# Patient Record
Sex: MALE | Race: BLACK OR AFRICAN AMERICAN | NOT HISPANIC OR LATINO | URBAN - METROPOLITAN AREA
[De-identification: names, ages, dates, MRNs, and addresses within clinical notes are randomized per-mention and may not be internally consistent; named-entity substitution may affect disease eponyms.]

---

## 2023-03-03 ENCOUNTER — INPATIENT (INPATIENT)
Facility: HOSPITAL | Age: 63
LOS: 2 days | Discharge: HOME HEALTH | End: 2023-03-06
Attending: INTERNAL MEDICINE | Admitting: INTERNAL MEDICINE
Payer: COMMERCIAL

## 2023-03-03 VITALS
SYSTOLIC BLOOD PRESSURE: 198 MMHG | TEMPERATURE: 99 F | DIASTOLIC BLOOD PRESSURE: 119 MMHG | OXYGEN SATURATION: 95 % | RESPIRATION RATE: 16 BRPM | HEART RATE: 117 BPM

## 2023-03-03 DIAGNOSIS — Z29.9 ENCOUNTER FOR PROPHYLACTIC MEASURES, UNSPECIFIED: ICD-10-CM

## 2023-03-03 DIAGNOSIS — K62.5 HEMORRHAGE OF ANUS AND RECTUM: ICD-10-CM

## 2023-03-03 DIAGNOSIS — I10 ESSENTIAL (PRIMARY) HYPERTENSION: ICD-10-CM

## 2023-03-03 DIAGNOSIS — I50.9 HEART FAILURE, UNSPECIFIED: ICD-10-CM

## 2023-03-03 DIAGNOSIS — E78.5 HYPERLIPIDEMIA, UNSPECIFIED: ICD-10-CM

## 2023-03-03 DIAGNOSIS — E11.65 TYPE 2 DIABETES MELLITUS WITH HYPERGLYCEMIA: ICD-10-CM

## 2023-03-03 LAB
ALBUMIN SERPL ELPH-MCNC: 4.1 G/DL — SIGNIFICANT CHANGE UP (ref 3.3–5)
ALP SERPL-CCNC: 132 U/L — HIGH (ref 40–120)
ALT FLD-CCNC: 76 U/L — HIGH (ref 4–41)
ANION GAP SERPL CALC-SCNC: 9 MMOL/L — SIGNIFICANT CHANGE UP (ref 7–14)
AST SERPL-CCNC: 59 U/L — HIGH (ref 4–40)
BASOPHILS # BLD AUTO: 0.01 K/UL — SIGNIFICANT CHANGE UP (ref 0–0.2)
BASOPHILS NFR BLD AUTO: 0.2 % — SIGNIFICANT CHANGE UP (ref 0–2)
BILIRUB SERPL-MCNC: 0.4 MG/DL — SIGNIFICANT CHANGE UP (ref 0.2–1.2)
BUN SERPL-MCNC: 12 MG/DL — SIGNIFICANT CHANGE UP (ref 7–23)
CALCIUM SERPL-MCNC: 9.4 MG/DL — SIGNIFICANT CHANGE UP (ref 8.4–10.5)
CHLORIDE SERPL-SCNC: 101 MMOL/L — SIGNIFICANT CHANGE UP (ref 98–107)
CO2 SERPL-SCNC: 29 MMOL/L — SIGNIFICANT CHANGE UP (ref 22–31)
CREAT SERPL-MCNC: 0.94 MG/DL — SIGNIFICANT CHANGE UP (ref 0.5–1.3)
EGFR: 91 ML/MIN/1.73M2 — SIGNIFICANT CHANGE UP
EOSINOPHIL # BLD AUTO: 0.03 K/UL — SIGNIFICANT CHANGE UP (ref 0–0.5)
EOSINOPHIL NFR BLD AUTO: 0.5 % — SIGNIFICANT CHANGE UP (ref 0–6)
FLUAV AG NPH QL: SIGNIFICANT CHANGE UP
FLUBV AG NPH QL: SIGNIFICANT CHANGE UP
GLUCOSE SERPL-MCNC: 356 MG/DL — HIGH (ref 70–99)
HCT VFR BLD CALC: 42.7 % — SIGNIFICANT CHANGE UP (ref 39–50)
HGB BLD-MCNC: 13.7 G/DL — SIGNIFICANT CHANGE UP (ref 13–17)
IANC: 4 K/UL — SIGNIFICANT CHANGE UP (ref 1.8–7.4)
IMM GRANULOCYTES NFR BLD AUTO: 0.2 % — SIGNIFICANT CHANGE UP (ref 0–0.9)
LIDOCAIN IGE QN: 15 U/L — SIGNIFICANT CHANGE UP (ref 7–60)
LYMPHOCYTES # BLD AUTO: 1.8 K/UL — SIGNIFICANT CHANGE UP (ref 1–3.3)
LYMPHOCYTES # BLD AUTO: 28.2 % — SIGNIFICANT CHANGE UP (ref 13–44)
MCHC RBC-ENTMCNC: 27.7 PG — SIGNIFICANT CHANGE UP (ref 27–34)
MCHC RBC-ENTMCNC: 32.1 GM/DL — SIGNIFICANT CHANGE UP (ref 32–36)
MCV RBC AUTO: 86.4 FL — SIGNIFICANT CHANGE UP (ref 80–100)
MONOCYTES # BLD AUTO: 0.53 K/UL — SIGNIFICANT CHANGE UP (ref 0–0.9)
MONOCYTES NFR BLD AUTO: 8.3 % — SIGNIFICANT CHANGE UP (ref 2–14)
NEUTROPHILS # BLD AUTO: 4 K/UL — SIGNIFICANT CHANGE UP (ref 1.8–7.4)
NEUTROPHILS NFR BLD AUTO: 62.6 % — SIGNIFICANT CHANGE UP (ref 43–77)
NRBC # BLD: 0 /100 WBCS — SIGNIFICANT CHANGE UP (ref 0–0)
NRBC # FLD: 0 K/UL — SIGNIFICANT CHANGE UP (ref 0–0)
NT-PROBNP SERPL-SCNC: 2898 PG/ML — HIGH
PLATELET # BLD AUTO: 270 K/UL — SIGNIFICANT CHANGE UP (ref 150–400)
POTASSIUM SERPL-MCNC: 4.3 MMOL/L — SIGNIFICANT CHANGE UP (ref 3.5–5.3)
POTASSIUM SERPL-SCNC: 4.3 MMOL/L — SIGNIFICANT CHANGE UP (ref 3.5–5.3)
PROT SERPL-MCNC: 7.2 G/DL — SIGNIFICANT CHANGE UP (ref 6–8.3)
RBC # BLD: 4.94 M/UL — SIGNIFICANT CHANGE UP (ref 4.2–5.8)
RBC # FLD: 12.4 % — SIGNIFICANT CHANGE UP (ref 10.3–14.5)
RSV RNA NPH QL NAA+NON-PROBE: SIGNIFICANT CHANGE UP
SARS-COV-2 RNA SPEC QL NAA+PROBE: SIGNIFICANT CHANGE UP
SODIUM SERPL-SCNC: 139 MMOL/L — SIGNIFICANT CHANGE UP (ref 135–145)
TROPONIN T, HIGH SENSITIVITY RESULT: 36 NG/L — SIGNIFICANT CHANGE UP
TROPONIN T, HIGH SENSITIVITY RESULT: 39 NG/L — SIGNIFICANT CHANGE UP
WBC # BLD: 6.38 K/UL — SIGNIFICANT CHANGE UP (ref 3.8–10.5)
WBC # FLD AUTO: 6.38 K/UL — SIGNIFICANT CHANGE UP (ref 3.8–10.5)

## 2023-03-03 PROCEDURE — 71046 X-RAY EXAM CHEST 2 VIEWS: CPT | Mod: 26

## 2023-03-03 PROCEDURE — 99285 EMERGENCY DEPT VISIT HI MDM: CPT

## 2023-03-03 PROCEDURE — 99223 1ST HOSP IP/OBS HIGH 75: CPT

## 2023-03-03 RX ORDER — ASPIRIN/CALCIUM CARB/MAGNESIUM 324 MG
81 TABLET ORAL DAILY
Refills: 0 | Status: DISCONTINUED | OUTPATIENT
Start: 2023-03-04 | End: 2023-03-06

## 2023-03-03 RX ORDER — DEXTROSE 50 % IN WATER 50 %
25 SYRINGE (ML) INTRAVENOUS ONCE
Refills: 0 | Status: DISCONTINUED | OUTPATIENT
Start: 2023-03-03 | End: 2023-03-06

## 2023-03-03 RX ORDER — HYDRALAZINE HCL 50 MG
10 TABLET ORAL ONCE
Refills: 0 | Status: COMPLETED | OUTPATIENT
Start: 2023-03-03 | End: 2023-03-03

## 2023-03-03 RX ORDER — METOPROLOL TARTRATE 50 MG
12.5 TABLET ORAL
Refills: 0 | Status: DISCONTINUED | OUTPATIENT
Start: 2023-03-03 | End: 2023-03-04

## 2023-03-03 RX ORDER — DEXTROSE 50 % IN WATER 50 %
12.5 SYRINGE (ML) INTRAVENOUS ONCE
Refills: 0 | Status: DISCONTINUED | OUTPATIENT
Start: 2023-03-03 | End: 2023-03-06

## 2023-03-03 RX ORDER — ACETAMINOPHEN 500 MG
650 TABLET ORAL EVERY 6 HOURS
Refills: 0 | Status: DISCONTINUED | OUTPATIENT
Start: 2023-03-03 | End: 2023-03-06

## 2023-03-03 RX ORDER — FUROSEMIDE 40 MG
40 TABLET ORAL DAILY
Refills: 0 | Status: DISCONTINUED | OUTPATIENT
Start: 2023-03-04 | End: 2023-03-06

## 2023-03-03 RX ORDER — SODIUM CHLORIDE 9 MG/ML
1000 INJECTION, SOLUTION INTRAVENOUS
Refills: 0 | Status: DISCONTINUED | OUTPATIENT
Start: 2023-03-03 | End: 2023-03-06

## 2023-03-03 RX ORDER — DEXTROSE 50 % IN WATER 50 %
15 SYRINGE (ML) INTRAVENOUS ONCE
Refills: 0 | Status: DISCONTINUED | OUTPATIENT
Start: 2023-03-03 | End: 2023-03-06

## 2023-03-03 RX ORDER — INSULIN GLARGINE 100 [IU]/ML
15 INJECTION, SOLUTION SUBCUTANEOUS AT BEDTIME
Refills: 0 | Status: DISCONTINUED | OUTPATIENT
Start: 2023-03-04 | End: 2023-03-06

## 2023-03-03 RX ORDER — INSULIN LISPRO 100/ML
VIAL (ML) SUBCUTANEOUS AT BEDTIME
Refills: 0 | Status: DISCONTINUED | OUTPATIENT
Start: 2023-03-03 | End: 2023-03-06

## 2023-03-03 RX ORDER — LANOLIN ALCOHOL/MO/W.PET/CERES
3 CREAM (GRAM) TOPICAL AT BEDTIME
Refills: 0 | Status: DISCONTINUED | OUTPATIENT
Start: 2023-03-03 | End: 2023-03-06

## 2023-03-03 RX ORDER — FUROSEMIDE 40 MG
40 TABLET ORAL ONCE
Refills: 0 | Status: COMPLETED | OUTPATIENT
Start: 2023-03-03 | End: 2023-03-03

## 2023-03-03 RX ORDER — INSULIN GLARGINE 100 [IU]/ML
15 INJECTION, SOLUTION SUBCUTANEOUS ONCE
Refills: 0 | Status: COMPLETED | OUTPATIENT
Start: 2023-03-03 | End: 2023-03-03

## 2023-03-03 RX ORDER — LISINOPRIL 2.5 MG/1
10 TABLET ORAL ONCE
Refills: 0 | Status: COMPLETED | OUTPATIENT
Start: 2023-03-03 | End: 2023-03-03

## 2023-03-03 RX ORDER — INSULIN LISPRO 100/ML
VIAL (ML) SUBCUTANEOUS
Refills: 0 | Status: DISCONTINUED | OUTPATIENT
Start: 2023-03-03 | End: 2023-03-06

## 2023-03-03 RX ORDER — LISINOPRIL 2.5 MG/1
10 TABLET ORAL DAILY
Refills: 0 | Status: DISCONTINUED | OUTPATIENT
Start: 2023-03-04 | End: 2023-03-04

## 2023-03-03 RX ORDER — INSULIN LISPRO 100/ML
4 VIAL (ML) SUBCUTANEOUS
Refills: 0 | Status: DISCONTINUED | OUTPATIENT
Start: 2023-03-03 | End: 2023-03-06

## 2023-03-03 RX ORDER — GLUCAGON INJECTION, SOLUTION 0.5 MG/.1ML
1 INJECTION, SOLUTION SUBCUTANEOUS ONCE
Refills: 0 | Status: DISCONTINUED | OUTPATIENT
Start: 2023-03-03 | End: 2023-03-06

## 2023-03-03 RX ORDER — ENOXAPARIN SODIUM 100 MG/ML
40 INJECTION SUBCUTANEOUS EVERY 24 HOURS
Refills: 0 | Status: DISCONTINUED | OUTPATIENT
Start: 2023-03-03 | End: 2023-03-06

## 2023-03-03 RX ADMIN — Medication 10 MILLIGRAM(S): at 14:08

## 2023-03-03 RX ADMIN — Medication 10 MILLIGRAM(S): at 19:53

## 2023-03-03 RX ADMIN — Medication 40 MILLIGRAM(S): at 14:08

## 2023-03-03 RX ADMIN — LISINOPRIL 10 MILLIGRAM(S): 2.5 TABLET ORAL at 21:34

## 2023-03-03 RX ADMIN — INSULIN GLARGINE 15 UNIT(S): 100 INJECTION, SOLUTION SUBCUTANEOUS at 22:24

## 2023-03-03 RX ADMIN — Medication 1: at 22:24

## 2023-03-03 RX ADMIN — Medication 12.5 MILLIGRAM(S): at 21:34

## 2023-03-03 NOTE — ED PROVIDER NOTE - NS ED ATTENDING STATEMENT MOD
This was a shared visit with the TAPAN. I reviewed and verified the documentation and independently performed the documented:

## 2023-03-03 NOTE — ED PROVIDER NOTE - CARDIAC, MLM
Normal rate, regular rhythm.  Heart sounds S1, S2.  No murmurs, rubs or gallops. 1+ Pitting edema b/l

## 2023-03-03 NOTE — PATIENT PROFILE ADULT - FUNCTIONAL SCREEN CURRENT LEVEL: SWALLOWING (IF SCORE 2 OR MORE FOR ANY ITEM, CONSULT REHAB SERVICES), MLM)
Addended by: Hannah Gamboa on: 3/12/2021 01:38 PM     Modules accepted: Orders 0 = swallows foods/liquids without difficulty

## 2023-03-03 NOTE — H&P ADULT - HISTORY OF PRESENT ILLNESS
62M with PMHx DM2, HTN presenting with SOB x1 week. Patient notes hx of HTN and was previously on hydralazine and diovan but states "they were not doing anything for me" and stopped taking them months ago. He also stopped taking metformin which was his only DM medication and is not on insulin. The last time he saw his PMD was about 6 months ago and plans to get a new one. He also noted LE edema x8 months without workup. This past week he had more SOB and ROSS limiting him. He also has orthopnea and is sleeping with one large thick pillow now and mostly on his side. He has PND as well. He went to [facility] [place] today and was told of fluid around his lungs and so presented to ED. He denies fevers, chills, CP, abdominal pain, vomiting, diarrhea, palpitations, LOC. He also endorses a dry cough x3 weeks. Also endorses 1-2 episodes of small BRBPR this past week and his only medication is ASA. Denies having any today. Denies prior hx GIB, CHF, CAD, stents, stroke, arrhythmia.    In ED, probnp elevated, CXR with vascular congestion. Received lasix 40 IV once and hydral 10mg x2 doses

## 2023-03-03 NOTE — H&P ADULT - PROBLEM SELECTOR PLAN 4
Lovenox 40mg qd  DASH/TLC CC fluid restricted diet 1-2 small episodes of this this week. Denies today. Hgb normal, VSS  -FOBT, ferritin, iron  -if no drops in hgb or recurrence, then outpatient PMD f/u. Patient advised of this to f/u with PMD regarding C-scope. He has never had one before. He verbalized understanding and agrees.

## 2023-03-03 NOTE — ED ADULT NURSE NOTE - OBJECTIVE STATEMENT
Patient A&o X4, history of HTN and DM, received in room 19, with complaints of SOB/cough. Patient states, "I went to  and they sent me here because the xray showed fluid on my lung ". Patient reports having dry cough for a few months now, patient denies any CP. Patient admits to SOB on exertion, denies SOB at rest, spO2 98% on RA. Patient also complains of b/l leg swelling x months, denies any numbness or tingling in legs at this time. Patient is hypertensive during assessment, admits to non compliance to BP medication. Patient able to speak in clear sentences, respirations equal and unlabored. Lung sounds clear b/l, equal chest rise and fall noted. Denies CP fever, chills, nausea, vomiting and diarrhea at this time. Skin warm and dry. Provider at bedside for eval, pending further orders.

## 2023-03-03 NOTE — ED PROVIDER NOTE - OBJECTIVE STATEMENT
66 yo male with pmhx of HTN HLD, DM presents to the ED with cc of sob x 3 d, sent in by urgent care for pleural and cardiac effusion drainage. patient states that dry cough x 4 weeks, not  getting better with Robitussin. endorses started dyspnea at rest, orthopnea and PND. Patient denies fever, chills, cp, n/v/dxxx    NKDA 66 yo male with pmhx of HTN HLD, DM presents to the ED with cc of sob x 3 d, sent in by urgent care for evaluation of pleural and cardiac effusion seen on cxr. patient states that he has had dry cough x 4 weeks, not  getting better with Robitussin and abx. endorses started ROSS, orthopnea and PND for past few days and went back to urgent care where he had cxr done today with above findings. Admits to van edema intermittently x 1 year. Pt is noncompliant with his BP meds. Patient denies fever, chills, cp, n/v/d, abdominal pain.    NKDA

## 2023-03-03 NOTE — H&P ADULT - ASSESSMENT
no
62M with PMHx DM2, HTN (not on any meds) presenting with SOB x1 week. Admitted for acute CHF exacerbation

## 2023-03-03 NOTE — H&P ADULT - PROBLEM SELECTOR PLAN 1
Hypervolemic on exam. +crackles, LE edema and elevated probnp. Admitted for new onset acute decompensated heart failure exacerbation.   -tele  -echo  -replete K<4 and Mg <2, fluid restriction  -lasix 40 IV qd  -start metoprolol 12.5mg BID for better rate control, start lisinopril 10mg qd  -rest of w/u per primary cardiology team in AM Dr. Zavala. Will likely need eventual stress testing vs angiogram for w/u of likely CHF Hypervolemic on exam. +crackles, LE edema and elevated probnp. Admitted for new onset acute decompensated heart failure exacerbation likely cause of respiratory distress. Will need IV diuresis on admission  -tele  -echo  -replete K<4 and Mg <2, fluid restriction  -lasix 40 IV qd  -start metoprolol 12.5mg BID for better rate control, start lisinopril 10mg qd  -rest of w/u per primary cardiology team in AM Dr. Zavala. Will likely need eventual stress testing vs angiogram for w/u of likely CHF  Transaminitis - likely from possible right sided HF? - US RUQ abdomen ordered

## 2023-03-03 NOTE — H&P ADULT - NSHPLABSRESULTS_GEN_ALL_CORE
Personally reviewed labs:                        13.7   6.38  )-----------( 270      ( 03 Mar 2023 14:40 )             42.7     03-03-23 @ 14:40    139  |  101  |  12             --------------------------< 356<H>     4.3  |  29  | 0.94    eGFR AA: --  eGFR N-AA: --    Calcium: 9.4  Phosphorus: --  Magnesium: --    AST: 59<H>    ALT: 76<H>  AlkPhos: 132<H>  Protein: 7.2  Albumin: 4.1  TBili: 0.4  D-Bili: --    Troponin 36--39  Probnp 2898      RADIOLOGY & ADDITIONAL TESTS:    EKG my independent interpretation: sinus tachycardia @ 110bpm, PVC, LVH with repol abnormalities, LAE, left axis deviation, no STD or ASHISH. incomplete RBBB    CXR my independent interpretation: no focal consolidation, mild vascular congestion

## 2023-03-03 NOTE — ED PROVIDER NOTE - ATTENDING APP SHARED VISIT CONTRIBUTION OF CARE
I have evaluated the patient and agree with the documentation and assessment as made by the TAPAN. We have discussed plan of care and work up for the patient.   This was a shared visit with the TAPAN. I reviewed and verified the documentation and independently performed the documented:   History, Exam and Medical Decision Making.    67M, HTN, HLD, DM who is sent in from urgent care for fluid in the lungs. For the past month, has been dealing with a dry cough. This has been complicated by worsening shortness of breath over the past 3 days. Reports leg swelling and PND. No hx of CHF - not on diuretics. Non-compliant on diovan and hydralazine. No headaches, fevers, chill chest pain, belly pain, nvd, dysuria, hematuria, recent travel, trauma, syncope. Physical: afebrile, non-toxic appearing, rrr, faint crackles b/l, abdomen soft, 1+ pitting edema of the LE bilaterally. Plan: labs, CXR - admit for new onset heart failure.

## 2023-03-03 NOTE — H&P ADULT - NSHPREVIEWOFSYSTEMS_GEN_ALL_CORE
ROS:    Constitutional: [ ] fevers [ ] chills [x ] weight gain  HEENT: [ ] postnasal drip [ ] nasal congestion  CV: [ ] chest pain [x ] orthopnea [ ] palpitations r  Resp: [ ] cough [x ] shortness of breath [ x] dyspnea [ ] wheezing   GI: [ ] nausea [ ] vomiting [ ] diarrhea [ ] constipation [ ] abd pain [x] BRBPR  : [ ] dysuria  [ ] increased urinary frequency  Musculoskeletal: [ ] back pain [ ] myalgias [ ] arthralgias [ ] fracture  Skin: [ ] rash [ ] itch  Neurological: [ ] headache [ ] dizziness [ ] syncope   Endocrine: [x ] diabetes [ ] thyroid problem  Hematologic/Lymphatic: [ ] anemia [ ] bleeding problem  [x ] All other systems negative

## 2023-03-03 NOTE — ED PROVIDER NOTE - PROGRESS NOTE DETAILS
Supervising Statement (TEMO Edward PA-C): I have personally seen and examined this patient.  I have fully participated in the care of this patient. I have reviewed all pertinent clinical information, including history, physical exam, plan and PA student’s note and agree as noted. Case discussed with Dr RISHI Zavala - requests admission to Dr MITZI Zavala

## 2023-03-03 NOTE — PATIENT PROFILE ADULT - FALL HARM RISK - HARM RISK INTERVENTIONS

## 2023-03-03 NOTE — H&P ADULT - PROBLEM SELECTOR PLAN 3
Not on meds, not on insulin  -a1c. Possible endo c/s for new insulin requirement depending on A1C  -start lantus 15u qhs, admelog 4u TID  -ISS Not on meds, not on insulin  -a1c 11.1 - endo consult emailed  -start lantus 15u qhs, admelog 4u TID  -ISS

## 2023-03-03 NOTE — H&P ADULT - NSHPPHYSICALEXAM_GEN_ALL_CORE
PHYSICAL EXAM:  Vital Signs Last 24 Hrs  T(C): 37 (03-03-23 @ 18:39)  T(F): 98.6 (03-03-23 @ 18:39), Max: 98.9 (03-03-23 @ 12:36)  HR: 95 (03-03-23 @ 18:39) (95 - 117)  BP: 178/102 (03-03-23 @ 19:43)  BP(mean): --  RR: 18 (03-03-23 @ 18:39) (16 - 22)  SpO2: 98% (03-03-23 @ 18:39) (95% - 101%)  Wt(kg): --    Constitutional: NAD, awake and alert, well developed  EYES: EOMI, conjunctiva clear  ENT:  Normal Hearing, no tonsillar exudates   Neck: Soft and supple , no thyromegaly   Respiratory: bilateral crackles at the bases, no tachypnea, no accessory muscle use  Cardiovascular: S1 and S2, regular rate and rhythm, no Murmurs, gallops or rubs, no JVD, 1+ symmetric leg edema  Gastrointestinal: Bowel Sounds present, soft, nontender, nondistended, no guarding, no rebound  Extremities: No cyanosis or clubbing; warm to touch  Vascular: 2+ peripheral pulses lower ex  Neurological: No focal deficits, CN II-XII intact bilaterally, sensation to light touch intact in all extremities.   Musculoskeletal: 5/5 strength b/l upper and lower extremities; no joint swelling.  Skin: No rashes, no ulcerations

## 2023-03-04 DIAGNOSIS — E78.49 OTHER HYPERLIPIDEMIA: ICD-10-CM

## 2023-03-04 LAB
A1C WITH ESTIMATED AVERAGE GLUCOSE RESULT: 11.3 % — HIGH (ref 4–5.6)
ANION GAP SERPL CALC-SCNC: 10 MMOL/L — SIGNIFICANT CHANGE UP (ref 7–14)
BUN SERPL-MCNC: 12 MG/DL — SIGNIFICANT CHANGE UP (ref 7–23)
CALCIUM SERPL-MCNC: 9 MG/DL — SIGNIFICANT CHANGE UP (ref 8.4–10.5)
CHLORIDE SERPL-SCNC: 102 MMOL/L — SIGNIFICANT CHANGE UP (ref 98–107)
CO2 SERPL-SCNC: 28 MMOL/L — SIGNIFICANT CHANGE UP (ref 22–31)
CREAT SERPL-MCNC: 0.87 MG/DL — SIGNIFICANT CHANGE UP (ref 0.5–1.3)
EGFR: 97 ML/MIN/1.73M2 — SIGNIFICANT CHANGE UP
ESTIMATED AVERAGE GLUCOSE: 278 — SIGNIFICANT CHANGE UP
FERRITIN SERPL-MCNC: 138 NG/ML — SIGNIFICANT CHANGE UP (ref 30–400)
GLUCOSE SERPL-MCNC: 202 MG/DL — HIGH (ref 70–99)
HCT VFR BLD CALC: 40.8 % — SIGNIFICANT CHANGE UP (ref 39–50)
HCV AB S/CO SERPL IA: 0.1 S/CO — SIGNIFICANT CHANGE UP (ref 0–0.99)
HCV AB SERPL-IMP: SIGNIFICANT CHANGE UP
HGB BLD-MCNC: 13.4 G/DL — SIGNIFICANT CHANGE UP (ref 13–17)
IRON SATN MFR SERPL: 20 % — SIGNIFICANT CHANGE UP (ref 14–50)
IRON SATN MFR SERPL: 56 UG/DL — SIGNIFICANT CHANGE UP (ref 45–165)
MAGNESIUM SERPL-MCNC: 1.8 MG/DL — SIGNIFICANT CHANGE UP (ref 1.6–2.6)
MCHC RBC-ENTMCNC: 27.9 PG — SIGNIFICANT CHANGE UP (ref 27–34)
MCHC RBC-ENTMCNC: 32.8 GM/DL — SIGNIFICANT CHANGE UP (ref 32–36)
MCV RBC AUTO: 84.8 FL — SIGNIFICANT CHANGE UP (ref 80–100)
NRBC # BLD: 0 /100 WBCS — SIGNIFICANT CHANGE UP (ref 0–0)
NRBC # FLD: 0 K/UL — SIGNIFICANT CHANGE UP (ref 0–0)
PHOSPHATE SERPL-MCNC: 2.9 MG/DL — SIGNIFICANT CHANGE UP (ref 2.5–4.5)
PLATELET # BLD AUTO: 255 K/UL — SIGNIFICANT CHANGE UP (ref 150–400)
POTASSIUM SERPL-MCNC: 3.3 MMOL/L — LOW (ref 3.5–5.3)
POTASSIUM SERPL-SCNC: 3.3 MMOL/L — LOW (ref 3.5–5.3)
RBC # BLD: 4.81 M/UL — SIGNIFICANT CHANGE UP (ref 4.2–5.8)
RBC # FLD: 12.2 % — SIGNIFICANT CHANGE UP (ref 10.3–14.5)
SODIUM SERPL-SCNC: 140 MMOL/L — SIGNIFICANT CHANGE UP (ref 135–145)
TIBC SERPL-MCNC: 282 UG/DL — SIGNIFICANT CHANGE UP (ref 220–430)
UIBC SERPL-MCNC: 226 UG/DL — SIGNIFICANT CHANGE UP (ref 110–370)
WBC # BLD: 5.59 K/UL — SIGNIFICANT CHANGE UP (ref 3.8–10.5)
WBC # FLD AUTO: 5.59 K/UL — SIGNIFICANT CHANGE UP (ref 3.8–10.5)

## 2023-03-04 PROCEDURE — 99222 1ST HOSP IP/OBS MODERATE 55: CPT

## 2023-03-04 PROCEDURE — 93010 ELECTROCARDIOGRAM REPORT: CPT

## 2023-03-04 PROCEDURE — 93306 TTE W/DOPPLER COMPLETE: CPT | Mod: 26

## 2023-03-04 PROCEDURE — 99232 SBSQ HOSP IP/OBS MODERATE 35: CPT

## 2023-03-04 RX ORDER — METOPROLOL TARTRATE 50 MG
25 TABLET ORAL
Refills: 0 | Status: DISCONTINUED | OUTPATIENT
Start: 2023-03-04 | End: 2023-03-06

## 2023-03-04 RX ORDER — HYDRALAZINE HCL 50 MG
10 TABLET ORAL ONCE
Refills: 0 | Status: COMPLETED | OUTPATIENT
Start: 2023-03-04 | End: 2023-03-04

## 2023-03-04 RX ORDER — LOSARTAN POTASSIUM 100 MG/1
25 TABLET, FILM COATED ORAL DAILY
Refills: 0 | Status: DISCONTINUED | OUTPATIENT
Start: 2023-03-04 | End: 2023-03-05

## 2023-03-04 RX ORDER — POTASSIUM CHLORIDE 20 MEQ
40 PACKET (EA) ORAL ONCE
Refills: 0 | Status: COMPLETED | OUTPATIENT
Start: 2023-03-04 | End: 2023-03-04

## 2023-03-04 RX ORDER — METOPROLOL TARTRATE 50 MG
12.5 TABLET ORAL ONCE
Refills: 0 | Status: COMPLETED | OUTPATIENT
Start: 2023-03-04 | End: 2023-03-04

## 2023-03-04 RX ORDER — HYDRALAZINE HCL 50 MG
10 TABLET ORAL EVERY 8 HOURS
Refills: 0 | Status: DISCONTINUED | OUTPATIENT
Start: 2023-03-04 | End: 2023-03-05

## 2023-03-04 RX ADMIN — Medication 1: at 09:19

## 2023-03-04 RX ADMIN — INSULIN GLARGINE 15 UNIT(S): 100 INJECTION, SOLUTION SUBCUTANEOUS at 23:16

## 2023-03-04 RX ADMIN — ENOXAPARIN SODIUM 40 MILLIGRAM(S): 100 INJECTION SUBCUTANEOUS at 17:34

## 2023-03-04 RX ADMIN — Medication 4 UNIT(S): at 09:19

## 2023-03-04 RX ADMIN — Medication 10 MILLIGRAM(S): at 23:12

## 2023-03-04 RX ADMIN — Medication 10 MILLIGRAM(S): at 17:56

## 2023-03-04 RX ADMIN — Medication 12.5 MILLIGRAM(S): at 10:24

## 2023-03-04 RX ADMIN — Medication 1: at 12:43

## 2023-03-04 RX ADMIN — Medication 40 MILLIEQUIVALENT(S): at 07:05

## 2023-03-04 RX ADMIN — Medication 12.5 MILLIGRAM(S): at 05:49

## 2023-03-04 RX ADMIN — Medication 25 MILLIGRAM(S): at 17:34

## 2023-03-04 RX ADMIN — Medication 40 MILLIEQUIVALENT(S): at 10:25

## 2023-03-04 RX ADMIN — Medication 4 UNIT(S): at 12:42

## 2023-03-04 RX ADMIN — Medication 81 MILLIGRAM(S): at 17:34

## 2023-03-04 RX ADMIN — Medication 4 UNIT(S): at 17:33

## 2023-03-04 RX ADMIN — LOSARTAN POTASSIUM 25 MILLIGRAM(S): 100 TABLET, FILM COATED ORAL at 10:24

## 2023-03-04 RX ADMIN — Medication 1: at 17:34

## 2023-03-04 NOTE — CONSULT NOTE ADULT - SUBJECTIVE AND OBJECTIVE BOX
62M with PMHx DM2, HTN presenting with SOB x1 week. Patient notes hx of HTN and was previously on hydralazine and diovan but states "they were not doing anything for me" and stopped taking them months ago. He also stopped taking metformin which was his only DM medication and is not on insulin. The last time he saw his PMD was about 6 months ago and plans to get a new one. He also noted LE edema x8 months without workup. This past week he had more SOB and ROSS limiting him. He also has orthopnea and is sleeping with one large thick pillow now and mostly on his side. He has PND as well. [place] [facility] [place] was told of fluid around his lungs and so presented to ED. He denies fevers, chills, CP, abdominal pain, vomiting, diarrhea, palpitations, LOC. He also endorses a dry cough x3 weeks. Also endorses 1-2 episodes of small BRBPR this past week and his only medication is ASA. Denies having any today. Denies prior hx GIB, CHF, CAD, stents, stroke, arrhythmia.    In ED, probnp elevated, CXR with vascular congestion. Received lasix 40 IV once and hydral 10mg x2 doses    PAST MEDICAL & SURGICAL HISTORY:  HTN (hypertension)  HLD (hyperlipidemia)  DM (diabetes mellitus)  No significant past surgical history      Transthoracic Echocardiogram (03.04.23 @ 07:44) >  CONCLUSIONS:  1. Tethered mitral valve leaflets with normal opening.  2. Moderately dilated left atrium.  LA volume index = 44  cc/m2.  3. Severe global left ventricular systolic dysfunction.  4. Normal right ventricular size and function.  5. Normal tricuspid valve. Minimal tricuspid regurgitation.  6. Estimated pulmonary artery systolic pressure equals 27  mm Hg, assuming right atrial pressure equals 10  mm Hg,  consistent with normal pulmonary pressures.  *** No previous Echo exam.    MEDICATIONS:  Home Medications:  aspirin 81 mg oral delayed release tablet: 1 tab(s) orally once a day (03 Mar 2023 21:03)      MEDICATIONS  (STANDING):  aspirin enteric coated 81 milliGRAM(s) Oral daily  dextrose 5%. 1000 milliLiter(s) (100 mL/Hr) IV Continuous <Continuous>  dextrose 5%. 1000 milliLiter(s) (50 mL/Hr) IV Continuous <Continuous>  dextrose 50% Injectable 25 Gram(s) IV Push once  dextrose 50% Injectable 12.5 Gram(s) IV Push once  dextrose 50% Injectable 25 Gram(s) IV Push once  enoxaparin Injectable 40 milliGRAM(s) SubCutaneous every 24 hours  furosemide   Injectable 40 milliGRAM(s) IV Push daily  glucagon  Injectable 1 milliGRAM(s) IntraMuscular once  insulin glargine Injectable (LANTUS) 15 Unit(s) SubCutaneous at bedtime  insulin lispro (ADMELOG) corrective regimen sliding scale   SubCutaneous three times a day before meals  insulin lispro (ADMELOG) corrective regimen sliding scale   SubCutaneous at bedtime  insulin lispro Injectable (ADMELOG) 4 Unit(s) SubCutaneous three times a day before meals  losartan 25 milliGRAM(s) Oral daily  metoprolol tartrate 25 milliGRAM(s) Oral two times a day      FAMILY HISTORY:  No pertinent family history in first degree relatives    SOCIAL HISTORY:    [ x] Non-smoker    Allergies    No Known Allergies    REVIEW OF SYSTEMS:  CONSTITUTIONAL: No fever, weight loss, or fatigue  EYES: No eye pain, visual disturbances, or discharge  ENMT:  No difficulty hearing, tinnitus, vertigo; No sinus or throat pain  NECK: No pain or stiffness  RESPIRATORY: No cough, wheezing, chills or hemoptysi. +SOB  CARDIOVASCULAR: No chest pain, palpitations, passing out, dizziness. +LE swelling  GASTROINTESTINAL: No abdominal or epigastric pain. No nausea, vomiting, or hematemesis; No diarrhea or constipation. No melena or hematochezia.  GENITOURINARY: No dysuria, frequency, hematuria, or incontinence  NEUROLOGICAL: No headaches, memory loss, loss of strength, numbness, or tremors  SKIN: No itching, burning, rashes, or lesions   	    [x] All others negative	  [ ] Unable to obtain    PHYSICAL EXAM:  T(C): 36.5 (03-04-23 @ 09:45), Max: 37.2 (03-03-23 @ 12:36)  HR: 84 (03-04-23 @ 09:45) (84 - 117)  BP: 159/105 (03-04-23 @ 09:45) (141/115 - 198/119)  RR: 20 (03-04-23 @ 09:45) (16 - 22)  SpO2: 96% (03-04-23 @ 09:45) (95% - 101%)  Wt(kg): --  I&O's Summary      Appearance: Normal	  Psychiatry: A & O x 3, Mood & affect appropriate  HEENT:   Normal oral mucosa, PERRL, EOMI	  Lymphatic: No lymphadenopathy  Cardiovascular: Normal S1 S2,RRR, No JVD, No murmurs  Respiratory: Mild crackles b/l bases  Gastrointestinal:  Soft, Non-tender, + BS	  Skin: No rashes, No ecchymoses, No cyanosis	  Neurologic: Non-focal  Extremities: Normal range of motion, No clubbing, cyanosis. +1 b/l le edema  Vascular: Peripheral pulses palpable 2+ bilaterally    TELEMETRY: NSR 80s    ECG:  	  RADIOLOGY:  OTHER: 	  	  LABS:	 	    CARDIAC MARKERS:  Troponin T, High Sensitivity Result: 39 ng/L (03-03 @ 17:46)  Troponin T, High Sensitivity Result: 36 ng/L (03-03 @ 14:40)                                  13.4   5.59  )-----------( 255      ( 04 Mar 2023 05:00 )             40.8     03-04    140  |  102  |  12  ----------------------------<  202<H>  3.3<L>   |  28  |  0.87    Ca    9.0      04 Mar 2023 05:00  Phos  2.9     03-04  Mg     1.80     03-04    TPro  7.2  /  Alb  4.1  /  TBili  0.4  /  DBili  x   /  AST  59<H>  /  ALT  76<H>  /  AlkPhos  132<H>  03-03      proBNP: Serum Pro-Brain Natriuretic Peptide: 2898 pg/mL (03-03 @ 14:40)

## 2023-03-04 NOTE — CONSULT NOTE ADULT - SUBJECTIVE AND OBJECTIVE BOX
HPI:  62M with PMHx DM2, HTN presenting with SOB x1 week. Patient notes hx of HTN and was previously on hydralazine and diovan but states "they were not doing anything for me" and stopped taking them months ago. He also stopped taking metformin which was his only DM medication and is not on insulin. The last time he saw his PMD was about 6 months ago and plans to get a new one. He also noted LE edema x8 months without workup. This past week he had more SOB and ROSS limiting him. He also has orthopnea and is sleeping with one large thick pillow now and mostly on his side. He has PND as well. He went to [facility] [place] today and was told of fluid around his lungs and so presented to ED. He denies fevers, chills, CP, abdominal pain, vomiting, diarrhea, palpitations, LOC. He also endorses a dry cough x3 weeks. Also endorses 1-2 episodes of small BRBPR this past week and his only medication is ASA. Denies having any today. Denies prior hx GIB, CHF, CAD, stents, stroke, arrhythmia.    In ED, probnp elevated, CXR with vascular congestion. Received lasix 40 IV once and hydral 10mg x2 doses (03 Mar 2023 21:13)    62 y.o. male with h/o Type 2 DM diagnosed in 2012 and follows with his PCP. Not blood glucose monitoring at home. At home does not take any medications.     Has not seen opthalmology and not aware of retinopathy. No kidney disease. Does have neuropathy.     Feeling well and no blurry vision and no polyuria and no polydipsia.    Reports less SOB but no CP    Diet:  Eats fruits and likes bread and cheese  Drinks juices        PAST MEDICAL & SURGICAL HISTORY:  HTN (hypertension)      HLD (hyperlipidemia)      DM (diabetes mellitus)      No significant past surgical history          FAMILY HISTORY:  Mother: Type 2 DM  Brother: Type 2 DM        Social History:  No smoking or ETOH use      Outpatient Medications:  aspirin 81 mg oral delayed release tablet: 1 tab(s) orally once a day (03 Mar 2023 21:03)    MEDICATIONS  (STANDING):  aspirin enteric coated 81 milliGRAM(s) Oral daily  dextrose 5%. 1000 milliLiter(s) (100 mL/Hr) IV Continuous <Continuous>  dextrose 5%. 1000 milliLiter(s) (50 mL/Hr) IV Continuous <Continuous>  dextrose 50% Injectable 25 Gram(s) IV Push once  dextrose 50% Injectable 12.5 Gram(s) IV Push once  dextrose 50% Injectable 25 Gram(s) IV Push once  enoxaparin Injectable 40 milliGRAM(s) SubCutaneous every 24 hours  furosemide   Injectable 40 milliGRAM(s) IV Push daily  glucagon  Injectable 1 milliGRAM(s) IntraMuscular once  insulin glargine Injectable (LANTUS) 15 Unit(s) SubCutaneous at bedtime  insulin lispro (ADMELOG) corrective regimen sliding scale   SubCutaneous three times a day before meals  insulin lispro (ADMELOG) corrective regimen sliding scale   SubCutaneous at bedtime  insulin lispro Injectable (ADMELOG) 4 Unit(s) SubCutaneous three times a day before meals  losartan 25 milliGRAM(s) Oral daily  metoprolol tartrate 25 milliGRAM(s) Oral two times a day    MEDICATIONS  (PRN):  acetaminophen     Tablet .. 650 milliGRAM(s) Oral every 6 hours PRN Temp greater or equal to 38C (100.4F), Mild Pain (1 - 3)  dextrose Oral Gel 15 Gram(s) Oral once PRN Blood Glucose LESS THAN 70 milliGRAM(s)/deciliter  melatonin 3 milliGRAM(s) Oral at bedtime PRN Insomnia      Allergies    No Known Allergies    Intolerances      Review of Systems:  Constitutional: No fever  Eyes: No blurry vision  Neuro: No tremors  HEENT: No pain  Cardiovascular: No chest pain, palpitations  Respiratory: Reports SOB, no cough  GI: No nausea, vomiting, abdominal pain  : No dysuria  Skin: no rash  Psych: no depression  Endocrine: no polyuria, polydipsia  Hem/lymph: reports b/l swelling    ALL OTHER SYSTEMS REVIEWED AND NEGATIVE      PHYSICAL EXAM:  VITALS: T(C): 36.6 (03-04-23 @ 13:45)  T(F): 97.9 (03-04-23 @ 13:45), Max: 98.6 (03-03-23 @ 18:39)  HR: 82 (03-04-23 @ 13:45) (82 - 101)  BP: 145/93 (03-04-23 @ 13:45) (145/93 - 178/102)  RR:  (16 - 20)  SpO2:  (95% - 98%)  Wt(kg): --  GENERAL: NAD  EYES: No proptosis, no lid lag  HEENT:  Atraumatic, Normocephalic  THYROID: Normal size, no palpable nodules  RESPIRATORY: Bibasilar crackles  CARDIOVASCULAR: Regular rate and rhythm; b/l peripheral edema is noted  GI: Soft, nontender, non distended, normal bowel sounds  SKIN: Dry, intact, No rashes or lesions  MUSCULOSKELETAL: Full range of motion, normal strength  NEURO: extraocular movements intact, no tremor  PSYCH:  normal affect, normal mood    POCT Blood Glucose.: 165 mg/dL (03-04-23 @ 12:26)  POCT Blood Glucose.: 169 mg/dL (03-04-23 @ 08:47)  POCT Blood Glucose.: 293 mg/dL (03-03-23 @ 22:14)  POCT Blood Glucose.: 336 mg/dL (03-03-23 @ 12:42)                            13.4   5.59  )-----------( 255      ( 04 Mar 2023 05:00 )             40.8       03-04    140  |  102  |  12  ----------------------------<  202<H>  3.3<L>   |  28  |  0.87    eGFR: 97    Ca    9.0      03-04  Mg     1.80     03-04  Phos  2.9     03-04    TPro  7.2  /  Alb  4.1  /  TBili  0.4  /  DBili  x   /  AST  59<H>  /  ALT  76<H>  /  AlkPhos  132<H>  03-03      Thyroid Function Tests:              Radiology:

## 2023-03-04 NOTE — CONSULT NOTE ADULT - ASSESSMENT
62M with PMHx DM2, HTN presenting with SOB x1 week. Patient notes hx of HTN and was previously on hydralazine and diovan but states "they were not doing anything for me" and stopped taking them months ago.    Acute on chronic systolic CHF  - cardiology on board  -D/t CHF likely i/s/o hypertensive CM  -CXR w/ vascular congestion  -BNP elevated  -Echo with severe lv dysfxn EF 21%  -Continue iv lasix 40mg daily for now  -Will need cardiac cath    HTN  -Previously on hydralazine and valsartan at home but discontinued himself  -Continue metoprolol  -Continue losartan 25  -Start Hydralazine 10mg tid  Uncontrolled DM  -Insulin on a sliding scale, endo called  - DVT ppx w sc Lovenox

## 2023-03-04 NOTE — CONSULT NOTE ADULT - ASSESSMENT
62 y.o. male with h/o Type 2 DM uncontrolled with Hba1c of 11.1%, HTN and hyperlipidemia presents with CHF exacerbation and hyperglycemia.

## 2023-03-04 NOTE — CONSULT NOTE ADULT - PROBLEM SELECTOR RECOMMENDATION 3
-At presents, patient has elevated LFTs and most likely related to his CHF  -Would check fasting lipid panel and would consider starting statin for CV risk reduction        Cecelia Shabazz DO  Attending Division of Endocrinology  [phone]

## 2023-03-04 NOTE — CONSULT NOTE ADULT - TIME BILLING
Patient seen and examined, agree with the above assessment and plan by TYRONE Dickinson.  62M with PMHx DM2, HTN presenting with SOB x1 week. Patient notes hx of HTN and was previously on hydralazine and diovan but states "they were not doing anything for me" and stopped taking them months ago.    pt presented with decompensated CHF in setting of medication noncompliance  TTE reveals severe global dysfunction  start IV lasix  BB  entresto  pt will need cardiac cath plan for monday

## 2023-03-04 NOTE — CONSULT NOTE ADULT - ASSESSMENT
A/P  62M with PMHx DM2, HTN presenting with SOB x1 week. Patient notes hx of HTN and was previously on hydralazine and diovan but states "they were not doing anything for me" and stopped taking them months ago.    #SOB  -Likely d/t fluid overload i/s/o hypertension  -CXR w/ vascular congestion  -BNP elevated  -Get echo  -Continue iv lasix 40mg daily for now    #HTN  -Previously on hydralazine and valsartan at home but discontinued himself  -Continue metoprolol  -Continue losartan 25  -Recommend starting hydralazine 10mg tid, can always increase if needed   Echo 3/4/23: Severe lv dysfxn EF 21%,     A/P  62M with PMHx DM2, HTN presenting with SOB x1 week. Patient notes hx of HTN and was previously on hydralazine and diovan but states "they were not doing anything for me" and stopped taking them months ago.    #SOB  -D/t CHF likely i/s/o hypertensive CM  -CXR w/ vascular congestion  -BNP elevated  -Echo with severe lv dysfxn EF 21%  -Continue iv lasix 40mg daily for now  -Will need cardiac cath    #HTN  -Previously on hydralazine and valsartan at home but discontinued himself  -Continue metoprolol  -Continue losartan 25  -Recommend starting hydralazine 10mg tid, can always increase if needed

## 2023-03-04 NOTE — CONSULT NOTE ADULT - PROBLEM SELECTOR RECOMMENDATION 9
-Discussed blood glucose and Hba1c goals  -Blood glucose target is 100 to 180  -Encouraged a carbohydrate consistent diet and avoiding juices  -Agree with Lantus 15 units QHS and Admelog 4 units QAC with low dose correction scale  -For discharge anticipate will need basal bolus regimen; however if insulin requirements remain low then will consider basal insulin plus metformin  and/or SGLT-2 inhibitor  -Will order diabetic teaching for insulin pen use  -He prefers to follow up with his PCP but strongly recommend endocrine follow up

## 2023-03-04 NOTE — CONSULT NOTE ADULT - SUBJECTIVE AND OBJECTIVE BOX
CARDIOLOGY CONSULT - Dr. Zavala         HPI:  62M with PMHx DM2, HTN presenting with SOB x1 week. Patient notes hx of HTN and was previously on hydralazine and diovan but states "they were not doing anything for me" and stopped taking them months ago. He also stopped taking metformin which was his only DM medication and is not on insulin. The last time he saw his PMD was about 6 months ago and plans to get a new one. He also noted LE edema x8 months without workup. This past week he had more SOB and ROSS limiting him. He also has orthopnea and is sleeping with one large thick pillow now and mostly on his side. He has PND as well. He went [place] [facility] [place] and was told of fluid around his lungs and so presented to ED. He denies fevers, chills, CP, abdominal pain, vomiting, diarrhea, palpitations, LOC. He also endorses a dry cough x3 weeks. Also endorses 1-2 episodes of small BRBPR this past week and his only medication is ASA. Denies having any today. Denies prior hx GIB, CHF, CAD, stents, stroke, arrhythmia.    In ED, probnp elevated, CXR with vascular congestion. Received lasix 40 IV once and hydral 10mg x2 doses (03 Mar 2023 21:13)      PAST MEDICAL & SURGICAL HISTORY:  HTN (hypertension)      HLD (hyperlipidemia)      DM (diabetes mellitus)      No significant past surgical history              PREVIOUS DIAGNOSTIC TESTING:    [ ] Echocardiogram:    [ ]  Catheterization:  [ ] Stress Test:  	      MEDICATIONS:  Home Medications:  aspirin 81 mg oral delayed release tablet: 1 tab(s) orally once a day (03 Mar 2023 21:03)      MEDICATIONS  (STANDING):  aspirin enteric coated 81 milliGRAM(s) Oral daily  dextrose 5%. 1000 milliLiter(s) (100 mL/Hr) IV Continuous <Continuous>  dextrose 5%. 1000 milliLiter(s) (50 mL/Hr) IV Continuous <Continuous>  dextrose 50% Injectable 25 Gram(s) IV Push once  dextrose 50% Injectable 12.5 Gram(s) IV Push once  dextrose 50% Injectable 25 Gram(s) IV Push once  enoxaparin Injectable 40 milliGRAM(s) SubCutaneous every 24 hours  furosemide   Injectable 40 milliGRAM(s) IV Push daily  glucagon  Injectable 1 milliGRAM(s) IntraMuscular once  insulin glargine Injectable (LANTUS) 15 Unit(s) SubCutaneous at bedtime  insulin lispro (ADMELOG) corrective regimen sliding scale   SubCutaneous three times a day before meals  insulin lispro (ADMELOG) corrective regimen sliding scale   SubCutaneous at bedtime  insulin lispro Injectable (ADMELOG) 4 Unit(s) SubCutaneous three times a day before meals  losartan 25 milliGRAM(s) Oral daily  metoprolol tartrate 25 milliGRAM(s) Oral two times a day      FAMILY HISTORY:  No pertinent family history in first degree relatives        SOCIAL HISTORY:    [ x] Non-smoker  [ ] Smoker  [ ] Alcohol    Allergies    No Known Allergies    Intolerances    	    REVIEW OF SYSTEMS:  CONSTITUTIONAL: No fever, weight loss, or fatigue  EYES: No eye pain, visual disturbances, or discharge  ENMT:  No difficulty hearing, tinnitus, vertigo; No sinus or throat pain  NECK: No pain or stiffness  RESPIRATORY: No cough, wheezing, chills or hemoptysi. +SOB  CARDIOVASCULAR: No chest pain, palpitations, passing out, dizziness. +LE swelling  GASTROINTESTINAL: No abdominal or epigastric pain. No nausea, vomiting, or hematemesis; No diarrhea or constipation. No melena or hematochezia.  GENITOURINARY: No dysuria, frequency, hematuria, or incontinence  NEUROLOGICAL: No headaches, memory loss, loss of strength, numbness, or tremors  SKIN: No itching, burning, rashes, or lesions   	    [x] All others negative	  [ ] Unable to obtain    PHYSICAL EXAM:  T(C): 36.5 (03-04-23 @ 09:45), Max: 37.2 (03-03-23 @ 12:36)  HR: 84 (03-04-23 @ 09:45) (84 - 117)  BP: 159/105 (03-04-23 @ 09:45) (141/115 - 198/119)  RR: 20 (03-04-23 @ 09:45) (16 - 22)  SpO2: 96% (03-04-23 @ 09:45) (95% - 101%)  Wt(kg): --  I&O's Summary      Appearance: Normal	  Psychiatry: A & O x 3, Mood & affect appropriate  HEENT:   Normal oral mucosa, PERRL, EOMI	  Lymphatic: No lymphadenopathy  Cardiovascular: Normal S1 S2,RRR, No JVD, No murmurs  Respiratory: Mild crackles b/l bases  Gastrointestinal:  Soft, Non-tender, + BS	  Skin: No rashes, No ecchymoses, No cyanosis	  Neurologic: Non-focal  Extremities: Normal range of motion, No clubbing, cyanosis. +1 b/l le edema  Vascular: Peripheral pulses palpable 2+ bilaterally    TELEMETRY: NSR 80s    ECG:  	  RADIOLOGY:  OTHER: 	  	  LABS:	 	    CARDIAC MARKERS:  Troponin T, High Sensitivity Result: 39 ng/L (03-03 @ 17:46)  Troponin T, High Sensitivity Result: 36 ng/L (03-03 @ 14:40)                                  13.4   5.59  )-----------( 255      ( 04 Mar 2023 05:00 )             40.8     03-04    140  |  102  |  12  ----------------------------<  202<H>  3.3<L>   |  28  |  0.87    Ca    9.0      04 Mar 2023 05:00  Phos  2.9     03-04  Mg     1.80     03-04    TPro  7.2  /  Alb  4.1  /  TBili  0.4  /  DBili  x   /  AST  59<H>  /  ALT  76<H>  /  AlkPhos  132<H>  03-03      proBNP: Serum Pro-Brain Natriuretic Peptide: 2898 pg/mL (03-03 @ 14:40)    Lipid Profile:   HgA1c:   TSH:        CARDIOLOGY CONSULT - Dr. Zavala         HPI:  62M with PMHx DM2, HTN presenting with SOB x1 week. Patient notes hx of HTN and was previously on hydralazine and diovan but states "they were not doing anything for me" and stopped taking them months ago. He also stopped taking metformin which was his only DM medication and is not on insulin. The last time he saw his PMD was about 6 months ago and plans to get a new one. He also noted LE edema x8 months without workup. This past week he had more SOB and ROSS limiting him. He also has orthopnea and is sleeping with one large thick pillow now and mostly on his side. He has PND as well. He went to [facility] [place] today and was told of fluid around his lungs and so presented to ED. He denies fevers, chills, CP, abdominal pain, vomiting, diarrhea, palpitations, LOC. He also endorses a dry cough x3 weeks. Also endorses 1-2 episodes of small BRBPR this past week and his only medication is ASA. Denies having any today. Denies prior hx GIB, CHF, CAD, stents, stroke, arrhythmia.    In ED, probnp elevated, CXR with vascular congestion. Received lasix 40 IV once and hydral 10mg x2 doses (03 Mar 2023 21:13)      PAST MEDICAL & SURGICAL HISTORY:  HTN (hypertension)      HLD (hyperlipidemia)      DM (diabetes mellitus)      No significant past surgical history              PREVIOUS DIAGNOSTIC TESTING:    [x] Echocardiogram:  < from: Transthoracic Echocardiogram (03.04.23 @ 07:44) >  CONCLUSIONS:  1. Tethered mitral valve leaflets with normal opening.  2. Moderately dilated left atrium.  LA volume index = 44  cc/m2.  3. Severe global left ventricular systolic dysfunction.  4. Normal right ventricular size and function.  5. Normal tricuspid valve. Minimal tricuspid regurgitation.  6. Estimated pulmonary artery systolic pressure equals 27  mm Hg, assuming right atrial pressure equals 10  mm Hg,  consistent with normal pulmonary pressures.  *** No previous Echo exam.    < end of copied text >    [ ]  Catheterization:  [ ] Stress Test:  	      MEDICATIONS:  Home Medications:  aspirin 81 mg oral delayed release tablet: 1 tab(s) orally once a day (03 Mar 2023 21:03)      MEDICATIONS  (STANDING):  aspirin enteric coated 81 milliGRAM(s) Oral daily  dextrose 5%. 1000 milliLiter(s) (100 mL/Hr) IV Continuous <Continuous>  dextrose 5%. 1000 milliLiter(s) (50 mL/Hr) IV Continuous <Continuous>  dextrose 50% Injectable 25 Gram(s) IV Push once  dextrose 50% Injectable 12.5 Gram(s) IV Push once  dextrose 50% Injectable 25 Gram(s) IV Push once  enoxaparin Injectable 40 milliGRAM(s) SubCutaneous every 24 hours  furosemide   Injectable 40 milliGRAM(s) IV Push daily  glucagon  Injectable 1 milliGRAM(s) IntraMuscular once  insulin glargine Injectable (LANTUS) 15 Unit(s) SubCutaneous at bedtime  insulin lispro (ADMELOG) corrective regimen sliding scale   SubCutaneous three times a day before meals  insulin lispro (ADMELOG) corrective regimen sliding scale   SubCutaneous at bedtime  insulin lispro Injectable (ADMELOG) 4 Unit(s) SubCutaneous three times a day before meals  losartan 25 milliGRAM(s) Oral daily  metoprolol tartrate 25 milliGRAM(s) Oral two times a day      FAMILY HISTORY:  No pertinent family history in first degree relatives        SOCIAL HISTORY:    [ x] Non-smoker  [ ] Smoker  [ ] Alcohol    Allergies    No Known Allergies    Intolerances    	    REVIEW OF SYSTEMS:  CONSTITUTIONAL: No fever, weight loss, or fatigue  EYES: No eye pain, visual disturbances, or discharge  ENMT:  No difficulty hearing, tinnitus, vertigo; No sinus or throat pain  NECK: No pain or stiffness  RESPIRATORY: No cough, wheezing, chills or hemoptysi. +SOB  CARDIOVASCULAR: No chest pain, palpitations, passing out, dizziness. +LE swelling  GASTROINTESTINAL: No abdominal or epigastric pain. No nausea, vomiting, or hematemesis; No diarrhea or constipation. No melena or hematochezia.  GENITOURINARY: No dysuria, frequency, hematuria, or incontinence  NEUROLOGICAL: No headaches, memory loss, loss of strength, numbness, or tremors  SKIN: No itching, burning, rashes, or lesions   	    [x] All others negative	  [ ] Unable to obtain    PHYSICAL EXAM:  T(C): 36.5 (03-04-23 @ 09:45), Max: 37.2 (03-03-23 @ 12:36)  HR: 84 (03-04-23 @ 09:45) (84 - 117)  BP: 159/105 (03-04-23 @ 09:45) (141/115 - 198/119)  RR: 20 (03-04-23 @ 09:45) (16 - 22)  SpO2: 96% (03-04-23 @ 09:45) (95% - 101%)  Wt(kg): --  I&O's Summary      Appearance: Normal	  Psychiatry: A & O x 3, Mood & affect appropriate  HEENT:   Normal oral mucosa, PERRL, EOMI	  Lymphatic: No lymphadenopathy  Cardiovascular: Normal S1 S2,RRR, No JVD, No murmurs  Respiratory: Mild crackles b/l bases  Gastrointestinal:  Soft, Non-tender, + BS	  Skin: No rashes, No ecchymoses, No cyanosis	  Neurologic: Non-focal  Extremities: Normal range of motion, No clubbing, cyanosis. +1 b/l le edema  Vascular: Peripheral pulses palpable 2+ bilaterally    TELEMETRY: NSR 80s    ECG:  	  RADIOLOGY:  OTHER: 	  	  LABS:	 	    CARDIAC MARKERS:  Troponin T, High Sensitivity Result: 39 ng/L (03-03 @ 17:46)  Troponin T, High Sensitivity Result: 36 ng/L (03-03 @ 14:40)                                  13.4   5.59  )-----------( 255      ( 04 Mar 2023 05:00 )             40.8     03-04    140  |  102  |  12  ----------------------------<  202<H>  3.3<L>   |  28  |  0.87    Ca    9.0      04 Mar 2023 05:00  Phos  2.9     03-04  Mg     1.80     03-04    TPro  7.2  /  Alb  4.1  /  TBili  0.4  /  DBili  x   /  AST  59<H>  /  ALT  76<H>  /  AlkPhos  132<H>  03-03      proBNP: Serum Pro-Brain Natriuretic Peptide: 2898 pg/mL (03-03 @ 14:40)    Lipid Profile:   HgA1c:   TSH:

## 2023-03-05 LAB
ANION GAP SERPL CALC-SCNC: 11 MMOL/L — SIGNIFICANT CHANGE UP (ref 7–14)
BUN SERPL-MCNC: 15 MG/DL — SIGNIFICANT CHANGE UP (ref 7–23)
CALCIUM SERPL-MCNC: 9.3 MG/DL — SIGNIFICANT CHANGE UP (ref 8.4–10.5)
CHLORIDE SERPL-SCNC: 104 MMOL/L — SIGNIFICANT CHANGE UP (ref 98–107)
CHOLEST SERPL-MCNC: 105 MG/DL — SIGNIFICANT CHANGE UP
CO2 SERPL-SCNC: 24 MMOL/L — SIGNIFICANT CHANGE UP (ref 22–31)
CREAT SERPL-MCNC: 0.93 MG/DL — SIGNIFICANT CHANGE UP (ref 0.5–1.3)
EGFR: 92 ML/MIN/1.73M2 — SIGNIFICANT CHANGE UP
FERRITIN SERPL-MCNC: 135 NG/ML — SIGNIFICANT CHANGE UP (ref 30–400)
FOLATE SERPL-MCNC: 18.6 NG/ML — HIGH (ref 3.1–17.5)
GLUCOSE SERPL-MCNC: 129 MG/DL — HIGH (ref 70–99)
HCT VFR BLD CALC: 41.7 % — SIGNIFICANT CHANGE UP (ref 39–50)
HDLC SERPL-MCNC: 41 MG/DL — SIGNIFICANT CHANGE UP
HGB BLD-MCNC: 13.3 G/DL — SIGNIFICANT CHANGE UP (ref 13–17)
IRON SATN MFR SERPL: 45 UG/DL — SIGNIFICANT CHANGE UP (ref 45–165)
LIPID PNL WITH DIRECT LDL SERPL: 50 MG/DL — SIGNIFICANT CHANGE UP
MAGNESIUM SERPL-MCNC: 1.8 MG/DL — SIGNIFICANT CHANGE UP (ref 1.6–2.6)
MCHC RBC-ENTMCNC: 27.9 PG — SIGNIFICANT CHANGE UP (ref 27–34)
MCHC RBC-ENTMCNC: 31.9 GM/DL — LOW (ref 32–36)
MCV RBC AUTO: 87.4 FL — SIGNIFICANT CHANGE UP (ref 80–100)
NON HDL CHOLESTEROL: 64 MG/DL — SIGNIFICANT CHANGE UP
NRBC # BLD: 0 /100 WBCS — SIGNIFICANT CHANGE UP (ref 0–0)
NRBC # FLD: 0 K/UL — SIGNIFICANT CHANGE UP (ref 0–0)
PHOSPHATE SERPL-MCNC: 3.6 MG/DL — SIGNIFICANT CHANGE UP (ref 2.5–4.5)
PLATELET # BLD AUTO: 245 K/UL — SIGNIFICANT CHANGE UP (ref 150–400)
POTASSIUM SERPL-MCNC: 3.6 MMOL/L — SIGNIFICANT CHANGE UP (ref 3.5–5.3)
POTASSIUM SERPL-SCNC: 3.6 MMOL/L — SIGNIFICANT CHANGE UP (ref 3.5–5.3)
RBC # BLD: 4.77 M/UL — SIGNIFICANT CHANGE UP (ref 4.2–5.8)
RBC # FLD: 12.7 % — SIGNIFICANT CHANGE UP (ref 10.3–14.5)
SODIUM SERPL-SCNC: 139 MMOL/L — SIGNIFICANT CHANGE UP (ref 135–145)
TRIGL SERPL-MCNC: 69 MG/DL — SIGNIFICANT CHANGE UP
TSH SERPL-MCNC: 1.29 UIU/ML — SIGNIFICANT CHANGE UP (ref 0.27–4.2)
VIT B12 SERPL-MCNC: 986 PG/ML — HIGH (ref 200–900)
WBC # BLD: 5.66 K/UL — SIGNIFICANT CHANGE UP (ref 3.8–10.5)
WBC # FLD AUTO: 5.66 K/UL — SIGNIFICANT CHANGE UP (ref 3.8–10.5)

## 2023-03-05 PROCEDURE — 93970 EXTREMITY STUDY: CPT | Mod: 26

## 2023-03-05 PROCEDURE — 76705 ECHO EXAM OF ABDOMEN: CPT | Mod: 26

## 2023-03-05 RX ORDER — HYDRALAZINE HCL 50 MG
25 TABLET ORAL THREE TIMES A DAY
Refills: 0 | Status: DISCONTINUED | OUTPATIENT
Start: 2023-03-05 | End: 2023-03-06

## 2023-03-05 RX ORDER — SACUBITRIL AND VALSARTAN 24; 26 MG/1; MG/1
1 TABLET, FILM COATED ORAL
Refills: 0 | Status: DISCONTINUED | OUTPATIENT
Start: 2023-03-06 | End: 2023-03-06

## 2023-03-05 RX ADMIN — Medication 1: at 17:49

## 2023-03-05 RX ADMIN — Medication 40 MILLIGRAM(S): at 06:58

## 2023-03-05 RX ADMIN — LOSARTAN POTASSIUM 25 MILLIGRAM(S): 100 TABLET, FILM COATED ORAL at 06:58

## 2023-03-05 RX ADMIN — Medication 25 MILLIGRAM(S): at 06:58

## 2023-03-05 RX ADMIN — Medication 25 MILLIGRAM(S): at 22:20

## 2023-03-05 RX ADMIN — Medication 4 UNIT(S): at 10:01

## 2023-03-05 RX ADMIN — Medication 25 MILLIGRAM(S): at 12:45

## 2023-03-05 RX ADMIN — Medication 10 MILLIGRAM(S): at 06:58

## 2023-03-05 RX ADMIN — Medication 81 MILLIGRAM(S): at 12:45

## 2023-03-05 RX ADMIN — Medication 4 UNIT(S): at 12:44

## 2023-03-05 RX ADMIN — Medication 25 MILLIGRAM(S): at 17:49

## 2023-03-05 RX ADMIN — Medication 4 UNIT(S): at 17:49

## 2023-03-05 RX ADMIN — INSULIN GLARGINE 15 UNIT(S): 100 INJECTION, SOLUTION SUBCUTANEOUS at 22:20

## 2023-03-06 ENCOUNTER — TRANSCRIPTION ENCOUNTER (OUTPATIENT)
Age: 63
End: 2023-03-06

## 2023-03-06 VITALS
SYSTOLIC BLOOD PRESSURE: 150 MMHG | RESPIRATION RATE: 18 BRPM | HEART RATE: 89 BPM | DIASTOLIC BLOOD PRESSURE: 95 MMHG | TEMPERATURE: 98 F | OXYGEN SATURATION: 99 %

## 2023-03-06 LAB
ANION GAP SERPL CALC-SCNC: 11 MMOL/L — SIGNIFICANT CHANGE UP (ref 7–14)
ANION GAP SERPL CALC-SCNC: 13 MMOL/L — SIGNIFICANT CHANGE UP (ref 7–14)
BUN SERPL-MCNC: 17 MG/DL — SIGNIFICANT CHANGE UP (ref 7–23)
BUN SERPL-MCNC: 18 MG/DL — SIGNIFICANT CHANGE UP (ref 7–23)
CALCIUM SERPL-MCNC: 8.9 MG/DL — SIGNIFICANT CHANGE UP (ref 8.4–10.5)
CALCIUM SERPL-MCNC: 9.4 MG/DL — SIGNIFICANT CHANGE UP (ref 8.4–10.5)
CHLORIDE SERPL-SCNC: 101 MMOL/L — SIGNIFICANT CHANGE UP (ref 98–107)
CHLORIDE SERPL-SCNC: 103 MMOL/L — SIGNIFICANT CHANGE UP (ref 98–107)
CO2 SERPL-SCNC: 25 MMOL/L — SIGNIFICANT CHANGE UP (ref 22–31)
CO2 SERPL-SCNC: 25 MMOL/L — SIGNIFICANT CHANGE UP (ref 22–31)
CREAT SERPL-MCNC: 1.03 MG/DL — SIGNIFICANT CHANGE UP (ref 0.5–1.3)
CREAT SERPL-MCNC: 1.09 MG/DL — SIGNIFICANT CHANGE UP (ref 0.5–1.3)
EGFR: 76 ML/MIN/1.73M2 — SIGNIFICANT CHANGE UP
EGFR: 82 ML/MIN/1.73M2 — SIGNIFICANT CHANGE UP
GLUCOSE SERPL-MCNC: 159 MG/DL — HIGH (ref 70–99)
GLUCOSE SERPL-MCNC: 174 MG/DL — HIGH (ref 70–99)
HCT VFR BLD CALC: 44.2 % — SIGNIFICANT CHANGE UP (ref 39–50)
HGB BLD-MCNC: 14.1 G/DL — SIGNIFICANT CHANGE UP (ref 13–17)
MAGNESIUM SERPL-MCNC: 1.7 MG/DL — SIGNIFICANT CHANGE UP (ref 1.6–2.6)
MAGNESIUM SERPL-MCNC: 2.2 MG/DL — SIGNIFICANT CHANGE UP (ref 1.6–2.6)
MCHC RBC-ENTMCNC: 27.7 PG — SIGNIFICANT CHANGE UP (ref 27–34)
MCHC RBC-ENTMCNC: 31.9 GM/DL — LOW (ref 32–36)
MCV RBC AUTO: 86.8 FL — SIGNIFICANT CHANGE UP (ref 80–100)
NRBC # BLD: 0 /100 WBCS — SIGNIFICANT CHANGE UP (ref 0–0)
NRBC # FLD: 0 K/UL — SIGNIFICANT CHANGE UP (ref 0–0)
PHOSPHATE SERPL-MCNC: 3.8 MG/DL — SIGNIFICANT CHANGE UP (ref 2.5–4.5)
PHOSPHATE SERPL-MCNC: 3.8 MG/DL — SIGNIFICANT CHANGE UP (ref 2.5–4.5)
PLATELET # BLD AUTO: 284 K/UL — SIGNIFICANT CHANGE UP (ref 150–400)
POTASSIUM SERPL-MCNC: 3.6 MMOL/L — SIGNIFICANT CHANGE UP (ref 3.5–5.3)
POTASSIUM SERPL-MCNC: 4 MMOL/L — SIGNIFICANT CHANGE UP (ref 3.5–5.3)
POTASSIUM SERPL-SCNC: 3.6 MMOL/L — SIGNIFICANT CHANGE UP (ref 3.5–5.3)
POTASSIUM SERPL-SCNC: 4 MMOL/L — SIGNIFICANT CHANGE UP (ref 3.5–5.3)
RBC # BLD: 5.09 M/UL — SIGNIFICANT CHANGE UP (ref 4.2–5.8)
RBC # FLD: 12.4 % — SIGNIFICANT CHANGE UP (ref 10.3–14.5)
SODIUM SERPL-SCNC: 139 MMOL/L — SIGNIFICANT CHANGE UP (ref 135–145)
SODIUM SERPL-SCNC: 139 MMOL/L — SIGNIFICANT CHANGE UP (ref 135–145)
WBC # BLD: 5.25 K/UL — SIGNIFICANT CHANGE UP (ref 3.8–10.5)
WBC # FLD AUTO: 5.25 K/UL — SIGNIFICANT CHANGE UP (ref 3.8–10.5)

## 2023-03-06 PROCEDURE — 93458 L HRT ARTERY/VENTRICLE ANGIO: CPT | Mod: 26

## 2023-03-06 RX ORDER — FUROSEMIDE 40 MG
1 TABLET ORAL
Qty: 30 | Refills: 0
Start: 2023-03-06 | End: 2023-04-04

## 2023-03-06 RX ORDER — METOPROLOL TARTRATE 50 MG
1 TABLET ORAL
Qty: 60 | Refills: 0
Start: 2023-03-06 | End: 2023-04-04

## 2023-03-06 RX ORDER — HYDRALAZINE HCL 50 MG
1 TABLET ORAL
Qty: 90 | Refills: 0
Start: 2023-03-06 | End: 2023-04-04

## 2023-03-06 RX ORDER — POTASSIUM CHLORIDE 20 MEQ
40 PACKET (EA) ORAL ONCE
Refills: 0 | Status: COMPLETED | OUTPATIENT
Start: 2023-03-06 | End: 2023-03-06

## 2023-03-06 RX ORDER — INSULIN GLARGINE 100 [IU]/ML
15 INJECTION, SOLUTION SUBCUTANEOUS
Qty: 5 | Refills: 0
Start: 2023-03-06 | End: 2023-04-04

## 2023-03-06 RX ORDER — MAGNESIUM SULFATE 500 MG/ML
2 VIAL (ML) INJECTION ONCE
Refills: 0 | Status: COMPLETED | OUTPATIENT
Start: 2023-03-06 | End: 2023-03-06

## 2023-03-06 RX ORDER — SACUBITRIL AND VALSARTAN 24; 26 MG/1; MG/1
1 TABLET, FILM COATED ORAL
Qty: 60 | Refills: 0
Start: 2023-03-06 | End: 2023-04-04

## 2023-03-06 RX ORDER — ASPIRIN/CALCIUM CARB/MAGNESIUM 324 MG
1 TABLET ORAL
Qty: 0 | Refills: 0 | DISCHARGE

## 2023-03-06 RX ORDER — ASPIRIN/CALCIUM CARB/MAGNESIUM 324 MG
1 TABLET ORAL
Qty: 30 | Refills: 0
Start: 2023-03-06 | End: 2023-04-04

## 2023-03-06 RX ADMIN — Medication 4 UNIT(S): at 08:36

## 2023-03-06 RX ADMIN — Medication 25 MILLIGRAM(S): at 18:04

## 2023-03-06 RX ADMIN — Medication 25 MILLIGRAM(S): at 15:13

## 2023-03-06 RX ADMIN — Medication 25 MILLIGRAM(S): at 05:48

## 2023-03-06 RX ADMIN — Medication 40 MILLIEQUIVALENT(S): at 02:43

## 2023-03-06 RX ADMIN — ENOXAPARIN SODIUM 40 MILLIGRAM(S): 100 INJECTION SUBCUTANEOUS at 18:04

## 2023-03-06 RX ADMIN — Medication 3 MILLIGRAM(S): at 02:43

## 2023-03-06 RX ADMIN — Medication 4 UNIT(S): at 12:10

## 2023-03-06 RX ADMIN — Medication 4 UNIT(S): at 18:06

## 2023-03-06 RX ADMIN — Medication 1: at 18:06

## 2023-03-06 RX ADMIN — Medication 1: at 12:11

## 2023-03-06 RX ADMIN — Medication 40 MILLIGRAM(S): at 05:48

## 2023-03-06 RX ADMIN — Medication 25 GRAM(S): at 02:51

## 2023-03-06 RX ADMIN — Medication 25 MILLIGRAM(S): at 05:49

## 2023-03-06 RX ADMIN — Medication 81 MILLIGRAM(S): at 15:13

## 2023-03-06 NOTE — CHART NOTE - NSCHARTNOTEFT_GEN_A_CORE
Informed by RN that patient needs discharge meds sent to different pharmacy as prior pharmacy has closed. Resent medications to new pharmacy.

## 2023-03-06 NOTE — DISCHARGE NOTE PROVIDER - HOSPITAL COURSE
HPI:  62M with PMHx DM2, HTN presenting with SOB x1 week. Patient notes hx of HTN and was previously on hydralazine and diovan but states "they were not doing anything for me" and stopped taking them months ago. He also stopped taking metformin which was his only DM medication and is not on insulin. The last time he saw his PMD was about 6 months ago and plans to get a new one. He also noted LE edema x8 months without workup. This past week he had more SOB and ROSS limiting him. He also has orthopnea and is sleeping with one large thick pillow now and mostly on his side. He has PND as well. [place] [facility] [place] was told of fluid around his lungs and so presented to ED. He denies fevers, chills, CP, abdominal pain, vomiting, diarrhea, palpitations, LOC. He also endorses a dry cough x3 weeks. Also endorses 1-2 episodes of small BRBPR this past week and his only medication is ASA. Denies having any today. Denies prior hx GIB, CHF, CAD, stents, stroke, arrhythmia.    In ED, probnp elevated, CXR with vascular congestion. Received lasix 40 IV once and hydral 10mg x2 doses (03 Mar 2023 21:13)      HOSPITAL COURSE:  Vital Signs Last 24 Hrs  T(C): 36.7 (06 Mar 2023 17:08), Max: 36.9 (05 Mar 2023 21:48)  T(F): 98.1 (06 Mar 2023 17:08), Max: 98.5 (05 Mar 2023 21:48)  HR: 89 (06 Mar 2023 17:08) (81 - 93)  BP: 150/95 (06 Mar 2023 17:08) (143/91 - 158/105)  BP(mean): --  RR: 18 (06 Mar 2023 17:08) (18 - 20)  SpO2: 99% (06 Mar 2023 17:08) (95% - 99%)    Parameters below as of 06 Mar 2023 17:08  Patient On (Oxygen Delivery Method): room air        CHF exacerbation   -s/p Lasix 40 IV qd, started entresto and metoprolol  -ECHO 3/4: Severe LF function EF 33%.  3/6 s/p Cardiac cath: Coronaries with minor luminal irregularities. Elevated LVEDP.  Right radial site check intact, no pain, swelling or bleeding. Rechecked at 1730.     Uncontrolled HTN  3/4- Monitor /114 at 5 pm - on Lasix, Losartan and Metoprolol - Started Hydralazine 10 mg TID. (Also gave a stat dose of IV hydralazine 10 mg) f/u BP at 7 pm  3/5 Increased Hydralazine to 25mg TID  pt on ntresto and metoprolol and lasix.    DM2 New onset  HgA1c 11.3%  started on Lantus SC    Patient currently denies chest pain, sob, palpitations, dizziness or lightheadedness. Case d/w attending ( Dr. Zavala): Pt to be dc'd home and F/U with his PCP and Cardiologist as instructed.

## 2023-03-06 NOTE — PROGRESS NOTE ADULT - SUBJECTIVE AND OBJECTIVE BOX
CARDIOLOGY FOLLOW UP NOTE - DR. REYES    Patient Name: JOHN PAUL ARREAGA  Date of Service: 03-05-23 @ 09:31    Patient seen and examined    Subjective:    cv: denies chest pain, dyspnea, palpitations, dizziness  pulmonary: denies cough  GI: denies abdominal pain, nausea, vomiting  vascular/legs: no edema   skin: no rash  ROS: otherwise negative   overnight events:      PHYSICAL EXAM:  T(C): 36.9 (03-05-23 @ 06:50), Max: 36.9 (03-04-23 @ 17:04)  HR: 94 (03-05-23 @ 06:50) (82 - 97)  BP: 174/96 (03-05-23 @ 06:50) (145/93 - 174/96)  RR: 18 (03-05-23 @ 06:50) (17 - 20)  SpO2: 100% (03-05-23 @ 06:50) (95% - 100%)  Wt(kg): --  I&O's Summary    04 Mar 2023 07:01  -  05 Mar 2023 07:00  --------------------------------------------------------  IN: 0 mL / OUT: 500 mL / NET: -500 mL      Daily     Daily     Appearance: Normal	  Cardiovascular: Normal S1 S2,RRR, No JVD, No murmurs  Respiratory: Lungs clear to auscultation	  Gastrointestinal:  Soft, Non-tender, + BS	  Extremities: Normal range of motion, No clubbing, cyanosis or edema      Home Medications:  aspirin 81 mg oral delayed release tablet: 1 tab(s) orally once a day (03 Mar 2023 21:03)      MEDICATIONS  (STANDING):  aspirin enteric coated 81 milliGRAM(s) Oral daily  dextrose 5%. 1000 milliLiter(s) (100 mL/Hr) IV Continuous <Continuous>  dextrose 5%. 1000 milliLiter(s) (50 mL/Hr) IV Continuous <Continuous>  dextrose 50% Injectable 25 Gram(s) IV Push once  dextrose 50% Injectable 12.5 Gram(s) IV Push once  dextrose 50% Injectable 25 Gram(s) IV Push once  enoxaparin Injectable 40 milliGRAM(s) SubCutaneous every 24 hours  furosemide   Injectable 40 milliGRAM(s) IV Push daily  glucagon  Injectable 1 milliGRAM(s) IntraMuscular once  hydrALAZINE 10 milliGRAM(s) Oral every 8 hours  insulin glargine Injectable (LANTUS) 15 Unit(s) SubCutaneous at bedtime  insulin lispro (ADMELOG) corrective regimen sliding scale   SubCutaneous three times a day before meals  insulin lispro (ADMELOG) corrective regimen sliding scale   SubCutaneous at bedtime  insulin lispro Injectable (ADMELOG) 4 Unit(s) SubCutaneous three times a day before meals  losartan 25 milliGRAM(s) Oral daily  metoprolol tartrate 25 milliGRAM(s) Oral two times a day      TELEMETRY: 	    ECG:  	  RADIOLOGY:   DIAGNOSTIC TESTING:  [ ] Echocardiogram:  [ ] Catheterization:  [ ] Stress Test:    OTHER: 	    LABS:	 	    CARDIAC MARKERS:        Troponin T, High Sensitivity Result: 39 ng/L (03-03 @ 17:46)  Troponin T, High Sensitivity Result: 36 ng/L (03-03 @ 14:40)                                13.3   5.66  )-----------( 245      ( 05 Mar 2023 06:54 )             41.7     03-05    139  |  104  |  15  ----------------------------<  129<H>  3.6   |  24  |  0.93    Ca    9.3      05 Mar 2023 06:54  Phos  3.6     03-05  Mg     1.80     03-05    TPro  7.2  /  Alb  4.1  /  TBili  0.4  /  DBili  x   /  AST  59<H>  /  ALT  76<H>  /  AlkPhos  132<H>  03-03    proBNP:     Lipid Profile:   HgA1c:     Creatinine, Serum: 0.93 mg/dL (03-05-23 @ 06:54)  Creatinine, Serum: 0.87 mg/dL (03-04-23 @ 05:00)  Creatinine, Serum: 0.94 mg/dL (03-03-23 @ 14:40)            
Patient is a 62y old  Male who presents with a chief complaint of SOB x1 week (06 Mar 2023 17:32)      SUBJECTIVE / OVERNIGHT EVENTS: LHC: clean coronaries, hypervolemia improved, BPs improved, DC home as per cardiology w outptn F/U    MEDICATIONS  (STANDING):  aspirin enteric coated 81 milliGRAM(s) Oral daily  dextrose 5%. 1000 milliLiter(s) (100 mL/Hr) IV Continuous <Continuous>  dextrose 5%. 1000 milliLiter(s) (50 mL/Hr) IV Continuous <Continuous>  dextrose 50% Injectable 25 Gram(s) IV Push once  dextrose 50% Injectable 12.5 Gram(s) IV Push once  dextrose 50% Injectable 25 Gram(s) IV Push once  enoxaparin Injectable 40 milliGRAM(s) SubCutaneous every 24 hours  furosemide   Injectable 40 milliGRAM(s) IV Push daily  glucagon  Injectable 1 milliGRAM(s) IntraMuscular once  hydrALAZINE 25 milliGRAM(s) Oral three times a day  insulin glargine Injectable (LANTUS) 15 Unit(s) SubCutaneous at bedtime  insulin lispro (ADMELOG) corrective regimen sliding scale   SubCutaneous three times a day before meals  insulin lispro (ADMELOG) corrective regimen sliding scale   SubCutaneous at bedtime  insulin lispro Injectable (ADMELOG) 4 Unit(s) SubCutaneous three times a day before meals  metoprolol tartrate 25 milliGRAM(s) Oral two times a day  sacubitril 24 mG/valsartan 26 mG 1 Tablet(s) Oral two times a day    MEDICATIONS  (PRN):  acetaminophen     Tablet .. 650 milliGRAM(s) Oral every 6 hours PRN Temp greater or equal to 38C (100.4F), Mild Pain (1 - 3)  dextrose Oral Gel 15 Gram(s) Oral once PRN Blood Glucose LESS THAN 70 milliGRAM(s)/deciliter  melatonin 3 milliGRAM(s) Oral at bedtime PRN Insomnia      Vital Signs Last 24 Hrs  T(F): 98.1 (03-06-23 @ 17:08), Max: 98.5 (03-05-23 @ 21:48)  HR: 89 (03-06-23 @ 17:08) (81 - 93)  BP: 150/95 (03-06-23 @ 17:08) (143/91 - 158/105)  RR: 18 (03-06-23 @ 17:08) (18 - 20)  SpO2: 99% (03-06-23 @ 17:08) (95% - 99%)  Telemetry:   CAPILLARY BLOOD GLUCOSE      POCT Blood Glucose.: 160 mg/dL (06 Mar 2023 17:28)  POCT Blood Glucose.: 136 mg/dL (06 Mar 2023 14:27)  POCT Blood Glucose.: 174 mg/dL (06 Mar 2023 12:04)  POCT Blood Glucose.: 142 mg/dL (06 Mar 2023 07:55)  POCT Blood Glucose.: 140 mg/dL (05 Mar 2023 21:54)    I&O's Summary    05 Mar 2023 07:01  -  06 Mar 2023 07:00  --------------------------------------------------------  IN: 560 mL / OUT: 0 mL / NET: 560 mL    06 Mar 2023 07:01  -  06 Mar 2023 20:53  --------------------------------------------------------  IN: 450 mL / OUT: 400 mL / NET: 50 mL        PHYSICAL EXAM:  GENERAL: NAD, well-developed  HEAD:  Atraumatic, Normocephalic  EYES: EOMI, PERRLA, conjunctiva and sclera clear  NECK: Supple, No JVD  CHEST/LUNG: Clear to auscultation bilaterally; No wheeze  HEART: Regular rate and rhythm; No murmurs, rubs, or gallops  ABDOMEN: Soft, Nontender, Nondistended; Bowel sounds present  EXTREMITIES:  2+ Peripheral Pulses, No clubbing, cyanosis, or edema  PSYCH: AAOx3  NEUROLOGY: non-focal  SKIN: No rashes or lesions    LABS:                        14.1   5.25  )-----------( 284      ( 06 Mar 2023 06:45 )             44.2     03-06    139  |  101  |  17  ----------------------------<  159<H>  4.0   |  25  |  1.09    Ca    9.4      06 Mar 2023 06:45  Phos  3.8     03-06  Mg     2.20     03-06                RADIOLOGY & ADDITIONAL TESTS:    Imaging Personally Reviewed:    Consultant(s) Notes Reviewed:      Care Discussed with Consultants/Other Providers:  
CARDIOLOGY FOLLOW UP NOTE - DR. REYES    Patient Name: JOHN PAUL ARREAGA  Date of Service: 03-06-23    Patient seen and examined    Subjective:    cv: denies chest pain, dyspnea, palpitations, dizziness  pulmonary: denies cough  GI: denies abdominal pain, nausea, vomiting  vascular/legs: no edema   skin: no rash  ROS: otherwise negative   overnight events:      PHYSICAL EXAM:  T(C): 36.9 (03-06-23 @ 09:45), Max: 36.9 (03-05-23 @ 21:48)  HR: 82 (03-06-23 @ 09:45) (81 - 93)  BP: 143/91 (03-06-23 @ 09:45) (143/91 - 165/99)  RR: 18 (03-06-23 @ 09:45) (18 - 20)  SpO2: 97% (03-06-23 @ 09:45) (95% - 100%)  Wt(kg): --  I&O's Summary    05 Mar 2023 07:01  -  06 Mar 2023 07:00  --------------------------------------------------------  IN: 560 mL / OUT: 0 mL / NET: 560 mL    06 Mar 2023 07:01  -  06 Mar 2023 12:52  --------------------------------------------------------  IN: 100 mL / OUT: 400 mL / NET: -300 mL      Daily     Daily     Appearance: Normal	  Cardiovascular: Normal S1 S2,RRR, No JVD, No murmurs  Respiratory: Lungs clear to auscultation	  Gastrointestinal:  Soft, Non-tender, + BS	  Extremities: Normal range of motion, No clubbing, cyanosis or edema      Home Medications:  aspirin 81 mg oral delayed release tablet: 1 tab(s) orally once a day (03 Mar 2023 21:03)      MEDICATIONS  (STANDING):  aspirin enteric coated 81 milliGRAM(s) Oral daily  dextrose 5%. 1000 milliLiter(s) (100 mL/Hr) IV Continuous <Continuous>  dextrose 5%. 1000 milliLiter(s) (50 mL/Hr) IV Continuous <Continuous>  dextrose 50% Injectable 25 Gram(s) IV Push once  dextrose 50% Injectable 12.5 Gram(s) IV Push once  dextrose 50% Injectable 25 Gram(s) IV Push once  enoxaparin Injectable 40 milliGRAM(s) SubCutaneous every 24 hours  furosemide   Injectable 40 milliGRAM(s) IV Push daily  glucagon  Injectable 1 milliGRAM(s) IntraMuscular once  hydrALAZINE 25 milliGRAM(s) Oral three times a day  insulin glargine Injectable (LANTUS) 15 Unit(s) SubCutaneous at bedtime  insulin lispro (ADMELOG) corrective regimen sliding scale   SubCutaneous three times a day before meals  insulin lispro (ADMELOG) corrective regimen sliding scale   SubCutaneous at bedtime  insulin lispro Injectable (ADMELOG) 4 Unit(s) SubCutaneous three times a day before meals  metoprolol tartrate 25 milliGRAM(s) Oral two times a day  sacubitril 24 mG/valsartan 26 mG 1 Tablet(s) Oral two times a day      TELEMETRY: 	    ECG:  	  RADIOLOGY:   DIAGNOSTIC TESTING:  [ ] Echocardiogram:  [ ] Catheterization:  [ ] Stress Test:    OTHER: 	    LABS:	 	    CARDIAC MARKERS:        Troponin T, High Sensitivity Result: 39 ng/L (03-03 @ 17:46)  Troponin T, High Sensitivity Result: 36 ng/L (03-03 @ 14:40)                                14.1   5.25  )-----------( 284      ( 06 Mar 2023 06:45 )             44.2     03-06    139  |  101  |  17  ----------------------------<  159<H>  4.0   |  25  |  1.09    Ca    9.4      06 Mar 2023 06:45  Phos  3.8     03-06  Mg     2.20     03-06      proBNP:     Lipid Profile:   HgA1c:     Creatinine, Serum: 1.09 mg/dL (03-06-23 @ 06:45)  Creatinine, Serum: 1.03 mg/dL (03-06-23 @ 01:05)  Creatinine, Serum: 0.93 mg/dL (03-05-23 @ 06:54)  Creatinine, Serum: 0.87 mg/dL (03-04-23 @ 05:00)  Creatinine, Serum: 0.94 mg/dL (03-03-23 @ 14:40)            
Patient is a 62y old  Male who presents with a chief complaint of SOB x1 week (05 Mar 2023 09:31)      SUBJECTIVE / OVERNIGHT EVENTS: Is and Os are not accurate, overall clinically hypervolemia is improving, Card cath in am, Losartan DCed, now on Entresto, cont Lopressor, Hydralazine added, BP still elevated     MEDICATIONS  (STANDING):  aspirin enteric coated 81 milliGRAM(s) Oral daily  dextrose 5%. 1000 milliLiter(s) (100 mL/Hr) IV Continuous <Continuous>  dextrose 5%. 1000 milliLiter(s) (50 mL/Hr) IV Continuous <Continuous>  dextrose 50% Injectable 25 Gram(s) IV Push once  dextrose 50% Injectable 12.5 Gram(s) IV Push once  dextrose 50% Injectable 25 Gram(s) IV Push once  enoxaparin Injectable 40 milliGRAM(s) SubCutaneous every 24 hours  furosemide   Injectable 40 milliGRAM(s) IV Push daily  glucagon  Injectable 1 milliGRAM(s) IntraMuscular once  hydrALAZINE 25 milliGRAM(s) Oral three times a day  insulin glargine Injectable (LANTUS) 15 Unit(s) SubCutaneous at bedtime  insulin lispro (ADMELOG) corrective regimen sliding scale   SubCutaneous three times a day before meals  insulin lispro (ADMELOG) corrective regimen sliding scale   SubCutaneous at bedtime  insulin lispro Injectable (ADMELOG) 4 Unit(s) SubCutaneous three times a day before meals  metoprolol tartrate 25 milliGRAM(s) Oral two times a day    MEDICATIONS  (PRN):  acetaminophen     Tablet .. 650 milliGRAM(s) Oral every 6 hours PRN Temp greater or equal to 38C (100.4F), Mild Pain (1 - 3)  dextrose Oral Gel 15 Gram(s) Oral once PRN Blood Glucose LESS THAN 70 milliGRAM(s)/deciliter  melatonin 3 milliGRAM(s) Oral at bedtime PRN Insomnia      Vital Signs Last 24 Hrs  T(F): 98.5 (03-05-23 @ 21:48), Max: 98.5 (03-05-23 @ 21:48)  HR: 93 (03-05-23 @ 21:48) (90 - 94)  BP: 150/94 (03-05-23 @ 21:48) (150/94 - 174/96)  RR: 19 (03-05-23 @ 21:48) (17 - 20)  SpO2: 98% (03-05-23 @ 21:48) (98% - 100%)  Telemetry:   CAPILLARY BLOOD GLUCOSE      POCT Blood Glucose.: 140 mg/dL (05 Mar 2023 21:54)  POCT Blood Glucose.: 156 mg/dL (05 Mar 2023 17:25)  POCT Blood Glucose.: 141 mg/dL (05 Mar 2023 12:22)  POCT Blood Glucose.: 147 mg/dL (05 Mar 2023 09:28)    I&O's Summary    04 Mar 2023 07:01  -  05 Mar 2023 07:00  --------------------------------------------------------  IN: 0 mL / OUT: 500 mL / NET: -500 mL    05 Mar 2023 07:01  -  05 Mar 2023 23:22  --------------------------------------------------------  IN: 560 mL / OUT: 0 mL / NET: 560 mL        PHYSICAL EXAM:  GENERAL: NAD, well-developed  HEAD:  Atraumatic, Normocephalic  EYES: EOMI, PERRLA, conjunctiva and sclera clear  NECK: Supple, No JVD  CHEST/LUNG: Clear to auscultation bilaterally; No wheeze  HEART: Regular rate and rhythm; No murmurs, rubs, or gallops  ABDOMEN: Soft, Nontender, Nondistended; Bowel sounds present  EXTREMITIES:  2+ Peripheral Pulses, No clubbing, cyanosis, or edema  PSYCH: AAOx3  NEUROLOGY: non-focal  SKIN: No rashes or lesions    LABS:                        13.3   5.66  )-----------( 245      ( 05 Mar 2023 06:54 )             41.7     03-05    139  |  104  |  15  ----------------------------<  129<H>  3.6   |  24  |  0.93    Ca    9.3      05 Mar 2023 06:54  Phos  3.6     03-05  Mg     1.80     03-05                RADIOLOGY & ADDITIONAL TESTS:    Imaging Personally Reviewed:    Consultant(s) Notes Reviewed:      Care Discussed with Consultants/Other Providers:

## 2023-03-06 NOTE — DISCHARGE NOTE NURSING/CASE MANAGEMENT/SOCIAL WORK - PATIENT PORTAL LINK FT
You can access the FollowMyHealth Patient Portal offered by [facility] by registering at the following website: [email or web address]. By joining [facility]’s FollowMyHealth portal, you will also be able to view your health information using other applications (apps) compatible with our system.

## 2023-03-06 NOTE — DISCHARGE NOTE PROVIDER - PROVIDER TOKENS
PROVIDER:[TOKEN:[[id number]],SCHEDULEDAPPT:[03/13/2023],ESTABLISHEDPATIENT:[T]],PROVIDER:[TOKEN:[[id number]],FOLLOWUP:[2 weeks]]

## 2023-03-06 NOTE — PROVIDER CONTACT NOTE (OTHER) - BACKGROUND
18beats of vtach, 17 beats of vtach
Admitted for SOB for 3 days
Admitted for heart failure
Admitted for shortness or breath.
pt admitted for heart failure
Patient admitted with hypertension.
pt admitted for heart failure

## 2023-03-06 NOTE — DISCHARGE NOTE PROVIDER - NSDCCPGOAL_GEN_ALL_CORE_FT
Previous Accession (Optional): [id number] Previous Accession (Optional): [id number] To get better and follow your care plan as instructed.

## 2023-03-06 NOTE — PROGRESS NOTE ADULT - ASSESSMENT
62M with PMHx DM2, HTN presenting with SOB x1 week. Patient notes hx of HTN and was previously on hydralazine and diovan but states "they were not doing anything for me" and stopped taking them months ago.    Acute on chronic systolic CHF  - cardiology on board  -D/t CHF likely i/s/o hypertensive CM  -CXR w/ vascular congestion  -BNP elevated  -Echo with severe lv dysfxn EF 21%  -Continue iv lasix 40mg daily for now  - Is and Os are not accurate, overall clinically hypervolemia is improving, Card cath in am, Losartan DCed, now on Entresto, cont Lopressor, Hydralazine added, BP still elevated       HTN  Entresto and  Hydralazine added  -Continue metoprolol    Uncontrolled DM  -Insulin on a sliding scale, endo on board, FS are stable  - DVT ppx w sc Lovenox    
Echo 3/4/23: Severe lv dysfxn EF 21%,     A/P  62M with PMHx DM2, HTN presenting with SOB x1 week. Patient notes hx of HTN and was previously on hydralazine and diovan but states "they were not doing anything for me" and stopped taking them months ago.    #SOB, acute HFrEF, new severe cmp  -secondary to acute HFrEF, new severe cmp  -echo with severe lv dysfxn EF 33%  -improved  -continue iv lasix 40mg daily for now  -plan for cardiac cath to r/o ischemic etiology, likely nonischemic cmp sc to htn  -cont entresto     #HTN  -cont bb  -entresto  -continue hydral         45 minutes spent on total encounter; more than 50% of the visit was spent counseling and/or coordinating care by the attending physician.        d/c later today if cath unremarkable     
Echo 3/4/23: Severe lv dysfxn EF 21%,     A/P  62M with PMHx DM2, HTN presenting with SOB x1 week. Patient notes hx of HTN and was previously on hydralazine and diovan but states "they were not doing anything for me" and stopped taking them months ago.    #SOB, acute HFrEF, new severe cmp  -secondary to acute HFrEF, new severe cmp  -echo with severe lv dysfxn EF 33%  -improved  -continue iv lasix 40mg daily for now  -plan for cardiac cath to r/o ischemic etiology, likely nonischemic cmp sc to htn  -continue metoprolol, d/c losartan, start entresto rod     #HTN  -cont bb  -start entresto rod  -continue hydral, inc to 25mg        d/w fam via phone     45 minutes spent on total encounter; more than 50% of the visit was spent counseling and/or coordinating care by the attending physician.    
62M with PMHx DM2, HTN presenting with SOB x1 week. Patient notes hx of HTN and was previously on hydralazine and diovan but states "they were not doing anything for me" and stopped taking them months ago.    Acute on chronic systolic CHF  - cardiology on board  -D/t CHF likely i/s/o hypertensive CM  -CXR w/ vascular congestion  -BNP elevated  -Echo with severe lv dysfxn EF 33%  -hypervolemia improved  - Is and Os are not accurate, overall clinically hypervolemia is improving, Card cath w nl coronaries on Entresto, cont Lopressor, Hydralazine added, BP still elevated       HTN  Entresto and  Hydralazine added  -Continue metoprolol    Uncontrolled DM  -Insulin on a sliding scale, endo on board, FS are stable  - DVT ppx w sc Lovenox

## 2023-03-06 NOTE — PROVIDER CONTACT NOTE (OTHER) - DATE AND TIME:
04-Mar-2023 17:08
04-Mar-2023 09:50
03-Mar-2023 19:46
04-Mar-2023 02:41
04-Mar-2023 04:35
04-Mar-2023 06:00
06-Mar-2023 00:47

## 2023-03-06 NOTE — PROVIDER CONTACT NOTE (OTHER) - NAME OF MD/NP/PA/DO NOTIFIED:
DUANE jang
desi veronica
DUANE Richards
DUANE Richards [id number]
DUANE Richards [id number]
[facility] [place] [id number]
Chhaya Miller

## 2023-03-06 NOTE — PROVIDER CONTACT NOTE (OTHER) - ASSESSMENT
/103; asymptomatic
/107; Asymptomatic
14 beats of vtach
Patient A&Ox4. Asymptomatic.
pt A&Ox4, /105, asymptomatic
18beats of vtach, 17 beats of vtach
pt asymptomatic no acute distress noted.

## 2023-03-06 NOTE — DISCHARGE NOTE PROVIDER - NSDCCPCAREPLAN_GEN_ALL_CORE_FT
PRINCIPAL DISCHARGE DIAGNOSIS  Diagnosis: Acute exacerbation of CHF (congestive heart failure)  Assessment and Plan of Treatment: Take medications as instructed on discharge  Follow up with your PCP and Cardiologist as instructed.  If shortness of breath or diff breathing worsens, return to ER.  Weigh yourself every day. If weight more than 2lbs in 2 days, notify your PCP or cardiologist immediately.      SECONDARY DISCHARGE DIAGNOSES  Diagnosis: Nonischemic cardiomyopathy  Assessment and Plan of Treatment: Take medications as instructed on discharge  Follow up with your PCP and Cardiologist as instructed.  If shortness of breath or diff breathing worsens, return to ER.  Weigh yourself every day. If weight more than 2lbs in 2 days, notify your PCP or cardiologist immediately.    Diagnosis: Uncontrolled hypertension  Assessment and Plan of Treatment: Take medications as instructed on discharge  Follow up with your PCP and Cardiologist as instructed.      Diagnosis: DM2 (diabetes mellitus, type 2)  Assessment and Plan of Treatment: Take medications as instructed on discharge  Follow up with your PCP as instructed.      Diagnosis: Other hyperlipidemia  Assessment and Plan of Treatment: Take medications as instructed on discharge  Follow up with your PCP as instructed.

## 2023-03-06 NOTE — DISCHARGE NOTE PROVIDER - NSDCMRMEDTOKEN_GEN_ALL_CORE_FT
aspirin 81 mg oral delayed release tablet: 1 tab(s) orally once a day  bd Alcohol pads for blood glucose checking: Apply topically to affected area 4 times a day   bd ultrafine needles 5mm: 1 applicatorful injectable once a day (at bedtime)   furosemide 40 mg oral tablet: 1 tab(s) orally once a day  glucometer (per patient&#x27;s insurance): Test blood sugars four times a day. Dispense #1 glucometer.  glucometer strips for Freestyle glucometer : 1 dose(s) subcutaneous 3 times a day   hydrALAZINE 25 mg oral tablet: 1 tab(s) orally 3 times a day  Lantus Solostar Pen 100 units/mL subcutaneous solution: 15 unit(s) subcutaneous once a day (at bedtime)   metoprolol tartrate 25 mg oral tablet: 1 tab(s) orally 2 times a day  sacubitril-valsartan 24 mg-26 mg oral tablet: 1 tab(s) orally 2 times a day

## 2023-03-06 NOTE — DISCHARGE NOTE PROVIDER - CARE PROVIDER_API CALL
DASH STEINBERG  Internal Medicine  Phone: [phone]  Fax: [phone]  Established Patient  Scheduled Appointment: 03/13/2023    Kevin Zavala (MD)  Cardiovascular Disease; Interventional Cardiology; Nuclear Cardiology  [address] [place] [address], [address]  [place] [address]  Phone: [phone]  Fax: [phone]  Follow Up Time: 2 weeks

## 2023-03-06 NOTE — PROVIDER CONTACT NOTE (OTHER) - ACTION/TREATMENT ORDERED:
administer 6PM metoprolol and reassess in 1 hr
obtain EKG, draw CBC  Edit: administer mag and k+
ACP to order BP medications
Provider Aware; EKG ordered and draw AM labs STAT.
Provider Aware; will recheck at 04:30AM
Provider aware; will recheck in 1 hour.
Per provider medication will be administered. Per provider will relay to night RN to recheck BP in 1 hour. Safety maintained. Will continue to monitor.

## 2023-12-01 DIAGNOSIS — I10 ESSENTIAL (PRIMARY) HYPERTENSION: ICD-10-CM

## 2023-12-01 DIAGNOSIS — I50.9 HEART FAILURE, UNSPECIFIED: ICD-10-CM

## 2023-12-01 DIAGNOSIS — E11.9 TYPE 2 DIABETES MELLITUS W/OUT COMPLICATIONS: ICD-10-CM

## 2023-12-01 PROBLEM — Z00.00 ENCOUNTER FOR PREVENTIVE HEALTH EXAMINATION: Status: ACTIVE | Noted: 2023-12-01

## 2023-12-01 PROBLEM — E78.5 HYPERLIPIDEMIA, UNSPECIFIED: Chronic | Status: ACTIVE | Noted: 2023-03-03

## 2023-12-04 ENCOUNTER — APPOINTMENT (OUTPATIENT)
Dept: HEART AND VASCULAR | Facility: CLINIC | Age: 63
End: 2023-12-04

## 2024-03-02 ENCOUNTER — INPATIENT (INPATIENT)
Facility: HOSPITAL | Age: 64
LOS: 25 days | Discharge: HOME HEALTH | End: 2024-03-28
Attending: INTERNAL MEDICINE | Admitting: INTERNAL MEDICINE
Payer: COMMERCIAL

## 2024-03-02 VITALS
HEART RATE: 115 BPM | DIASTOLIC BLOOD PRESSURE: 130 MMHG | SYSTOLIC BLOOD PRESSURE: 170 MMHG | TEMPERATURE: 98 F | RESPIRATION RATE: 25 BRPM | OXYGEN SATURATION: 99 %

## 2024-03-02 DIAGNOSIS — I16.0 HYPERTENSIVE URGENCY: ICD-10-CM

## 2024-03-02 DIAGNOSIS — E11.9 TYPE 2 DIABETES MELLITUS WITHOUT COMPLICATIONS: ICD-10-CM

## 2024-03-02 DIAGNOSIS — I50.9 HEART FAILURE, UNSPECIFIED: ICD-10-CM

## 2024-03-02 DIAGNOSIS — E78.5 HYPERLIPIDEMIA, UNSPECIFIED: ICD-10-CM

## 2024-03-02 DIAGNOSIS — I50.23 ACUTE ON CHRONIC SYSTOLIC (CONGESTIVE) HEART FAILURE: ICD-10-CM

## 2024-03-02 LAB
ALBUMIN SERPL ELPH-MCNC: 3.3 G/DL — SIGNIFICANT CHANGE UP (ref 3.3–5)
ALP SERPL-CCNC: 305 U/L — HIGH (ref 40–120)
ALT FLD-CCNC: 18 U/L — SIGNIFICANT CHANGE UP (ref 4–41)
ANION GAP SERPL CALC-SCNC: 11 MMOL/L — SIGNIFICANT CHANGE UP (ref 7–14)
AST SERPL-CCNC: 44 U/L — HIGH (ref 4–40)
BASOPHILS # BLD AUTO: 0.01 K/UL — SIGNIFICANT CHANGE UP (ref 0–0.2)
BASOPHILS NFR BLD AUTO: 0.2 % — SIGNIFICANT CHANGE UP (ref 0–2)
BILIRUB SERPL-MCNC: 1.3 MG/DL — HIGH (ref 0.2–1.2)
BLOOD GAS VENOUS COMPREHENSIVE RESULT: SIGNIFICANT CHANGE UP
BUN SERPL-MCNC: 20 MG/DL — SIGNIFICANT CHANGE UP (ref 7–23)
CALCIUM SERPL-MCNC: 9.2 MG/DL — SIGNIFICANT CHANGE UP (ref 8.4–10.5)
CHLORIDE SERPL-SCNC: 103 MMOL/L — SIGNIFICANT CHANGE UP (ref 98–107)
CO2 SERPL-SCNC: 30 MMOL/L — SIGNIFICANT CHANGE UP (ref 22–31)
CREAT SERPL-MCNC: 1.07 MG/DL — SIGNIFICANT CHANGE UP (ref 0.5–1.3)
EGFR: 77 ML/MIN/1.73M2 — SIGNIFICANT CHANGE UP
EOSINOPHIL # BLD AUTO: 0.04 K/UL — SIGNIFICANT CHANGE UP (ref 0–0.5)
EOSINOPHIL NFR BLD AUTO: 0.9 % — SIGNIFICANT CHANGE UP (ref 0–6)
FLUAV AG NPH QL: SIGNIFICANT CHANGE UP
FLUBV AG NPH QL: SIGNIFICANT CHANGE UP
GLUCOSE BLDC GLUCOMTR-MCNC: 112 MG/DL — HIGH (ref 70–99)
GLUCOSE BLDC GLUCOMTR-MCNC: 127 MG/DL — HIGH (ref 70–99)
GLUCOSE BLDC GLUCOMTR-MCNC: 143 MG/DL — HIGH (ref 70–99)
GLUCOSE SERPL-MCNC: 131 MG/DL — HIGH (ref 70–99)
HCT VFR BLD CALC: 41.2 % — SIGNIFICANT CHANGE UP (ref 39–50)
HGB BLD-MCNC: 13 G/DL — SIGNIFICANT CHANGE UP (ref 13–17)
IANC: 3.02 K/UL — SIGNIFICANT CHANGE UP (ref 1.8–7.4)
IMM GRANULOCYTES NFR BLD AUTO: 0.2 % — SIGNIFICANT CHANGE UP (ref 0–0.9)
LACTATE SERPL-SCNC: 1.4 MMOL/L — SIGNIFICANT CHANGE UP (ref 0.5–2)
LYMPHOCYTES # BLD AUTO: 0.97 K/UL — LOW (ref 1–3.3)
LYMPHOCYTES # BLD AUTO: 21.4 % — SIGNIFICANT CHANGE UP (ref 13–44)
MCHC RBC-ENTMCNC: 29.1 PG — SIGNIFICANT CHANGE UP (ref 27–34)
MCHC RBC-ENTMCNC: 31.6 GM/DL — LOW (ref 32–36)
MCV RBC AUTO: 92.2 FL — SIGNIFICANT CHANGE UP (ref 80–100)
MONOCYTES # BLD AUTO: 0.49 K/UL — SIGNIFICANT CHANGE UP (ref 0–0.9)
MONOCYTES NFR BLD AUTO: 10.8 % — SIGNIFICANT CHANGE UP (ref 2–14)
NEUTROPHILS # BLD AUTO: 3.02 K/UL — SIGNIFICANT CHANGE UP (ref 1.8–7.4)
NEUTROPHILS NFR BLD AUTO: 66.5 % — SIGNIFICANT CHANGE UP (ref 43–77)
NRBC # BLD: 0 /100 WBCS — SIGNIFICANT CHANGE UP (ref 0–0)
NRBC # FLD: 0 K/UL — SIGNIFICANT CHANGE UP (ref 0–0)
NT-PROBNP SERPL-SCNC: HIGH PG/ML
PLATELET # BLD AUTO: 287 K/UL — SIGNIFICANT CHANGE UP (ref 150–400)
POTASSIUM SERPL-MCNC: 4.8 MMOL/L — SIGNIFICANT CHANGE UP (ref 3.5–5.3)
POTASSIUM SERPL-SCNC: 4.8 MMOL/L — SIGNIFICANT CHANGE UP (ref 3.5–5.3)
PROT SERPL-MCNC: 7.5 G/DL — SIGNIFICANT CHANGE UP (ref 6–8.3)
RBC # BLD: 4.47 M/UL — SIGNIFICANT CHANGE UP (ref 4.2–5.8)
RBC # FLD: 16 % — HIGH (ref 10.3–14.5)
RSV RNA NPH QL NAA+NON-PROBE: SIGNIFICANT CHANGE UP
SARS-COV-2 RNA SPEC QL NAA+PROBE: SIGNIFICANT CHANGE UP
SODIUM SERPL-SCNC: 144 MMOL/L — SIGNIFICANT CHANGE UP (ref 135–145)
WBC # BLD: 4.54 K/UL — SIGNIFICANT CHANGE UP (ref 3.8–10.5)
WBC # FLD AUTO: 4.54 K/UL — SIGNIFICANT CHANGE UP (ref 3.8–10.5)

## 2024-03-02 PROCEDURE — 99285 EMERGENCY DEPT VISIT HI MDM: CPT

## 2024-03-02 PROCEDURE — 71046 X-RAY EXAM CHEST 2 VIEWS: CPT | Mod: 26

## 2024-03-02 PROCEDURE — 99223 1ST HOSP IP/OBS HIGH 75: CPT

## 2024-03-02 RX ORDER — DEXTROSE 50 % IN WATER 50 %
25 SYRINGE (ML) INTRAVENOUS ONCE
Refills: 0 | Status: DISCONTINUED | OUTPATIENT
Start: 2024-03-02 | End: 2024-03-28

## 2024-03-02 RX ORDER — GLUCAGON INJECTION, SOLUTION 0.5 MG/.1ML
1 INJECTION, SOLUTION SUBCUTANEOUS ONCE
Refills: 0 | Status: DISCONTINUED | OUTPATIENT
Start: 2024-03-02 | End: 2024-03-28

## 2024-03-02 RX ORDER — LANOLIN ALCOHOL/MO/W.PET/CERES
3 CREAM (GRAM) TOPICAL AT BEDTIME
Refills: 0 | Status: DISCONTINUED | OUTPATIENT
Start: 2024-03-02 | End: 2024-03-28

## 2024-03-02 RX ORDER — METFORMIN HYDROCHLORIDE 850 MG/1
0 TABLET ORAL
Qty: 0 | Refills: 1 | DISCHARGE

## 2024-03-02 RX ORDER — FUROSEMIDE 40 MG
40 TABLET ORAL DAILY
Refills: 0 | Status: DISCONTINUED | OUTPATIENT
Start: 2024-03-03 | End: 2024-03-04

## 2024-03-02 RX ORDER — SODIUM CHLORIDE 9 MG/ML
1000 INJECTION, SOLUTION INTRAVENOUS
Refills: 0 | Status: DISCONTINUED | OUTPATIENT
Start: 2024-03-02 | End: 2024-03-28

## 2024-03-02 RX ORDER — INSULIN LISPRO 100/ML
VIAL (ML) SUBCUTANEOUS
Refills: 0 | Status: DISCONTINUED | OUTPATIENT
Start: 2024-03-02 | End: 2024-03-28

## 2024-03-02 RX ORDER — HYDRALAZINE HCL 50 MG
25 TABLET ORAL THREE TIMES A DAY
Refills: 0 | Status: DISCONTINUED | OUTPATIENT
Start: 2024-03-02 | End: 2024-03-16

## 2024-03-02 RX ORDER — FUROSEMIDE 40 MG
40 TABLET ORAL ONCE
Refills: 0 | Status: COMPLETED | OUTPATIENT
Start: 2024-03-02 | End: 2024-03-02

## 2024-03-02 RX ORDER — INSULIN LISPRO 100/ML
VIAL (ML) SUBCUTANEOUS AT BEDTIME
Refills: 0 | Status: DISCONTINUED | OUTPATIENT
Start: 2024-03-02 | End: 2024-03-28

## 2024-03-02 RX ORDER — ACETAMINOPHEN 500 MG
650 TABLET ORAL EVERY 6 HOURS
Refills: 0 | Status: DISCONTINUED | OUTPATIENT
Start: 2024-03-02 | End: 2024-03-28

## 2024-03-02 RX ORDER — INFLUENZA VIRUS VACCINE 15; 15; 15; 15 UG/.5ML; UG/.5ML; UG/.5ML; UG/.5ML
0.5 SUSPENSION INTRAMUSCULAR ONCE
Refills: 0 | Status: DISCONTINUED | OUTPATIENT
Start: 2024-03-02 | End: 2024-03-28

## 2024-03-02 RX ORDER — METOPROLOL TARTRATE 50 MG
50 TABLET ORAL DAILY
Refills: 0 | Status: DISCONTINUED | OUTPATIENT
Start: 2024-03-02 | End: 2024-03-03

## 2024-03-02 RX ORDER — NITROGLYCERIN 6.5 MG
0.4 CAPSULE, EXTENDED RELEASE ORAL ONCE
Refills: 0 | Status: COMPLETED | OUTPATIENT
Start: 2024-03-02 | End: 2024-03-02

## 2024-03-02 RX ORDER — DEXTROSE 50 % IN WATER 50 %
15 SYRINGE (ML) INTRAVENOUS ONCE
Refills: 0 | Status: DISCONTINUED | OUTPATIENT
Start: 2024-03-02 | End: 2024-03-28

## 2024-03-02 RX ORDER — ONDANSETRON 8 MG/1
4 TABLET, FILM COATED ORAL EVERY 8 HOURS
Refills: 0 | Status: DISCONTINUED | OUTPATIENT
Start: 2024-03-02 | End: 2024-03-28

## 2024-03-02 RX ORDER — ACETAMINOPHEN 500 MG
650 TABLET ORAL ONCE
Refills: 0 | Status: COMPLETED | OUTPATIENT
Start: 2024-03-02 | End: 2024-03-02

## 2024-03-02 RX ORDER — DEXTROSE 50 % IN WATER 50 %
12.5 SYRINGE (ML) INTRAVENOUS ONCE
Refills: 0 | Status: DISCONTINUED | OUTPATIENT
Start: 2024-03-02 | End: 2024-03-28

## 2024-03-02 RX ADMIN — Medication 650 MILLIGRAM(S): at 15:13

## 2024-03-02 RX ADMIN — Medication 40 MILLIGRAM(S): at 19:21

## 2024-03-02 RX ADMIN — Medication 25 MILLIGRAM(S): at 21:59

## 2024-03-02 RX ADMIN — Medication 40 MILLIGRAM(S): at 11:46

## 2024-03-02 RX ADMIN — Medication 0.4 MILLIGRAM(S): at 15:13

## 2024-03-02 NOTE — ED ADULT NURSE NOTE - SUICIDE SCREENING DEPRESSION
32-year-old  0 para 0 with history of trying to conceive for the last year.  Patient has regular cycles every 29 days lasting 5 days last menstrual period was 2022 and last Pap smear was in 2022 and it was normal.  Patient usually ovulates around day 16.  Patient status post thyroidectomy and is on Synthroid and her latest TSH is within normal.  Semen analysis showed low count x2.  The  was seen by Dr. Holli BARRERA who recommended in-vitro fertilization.  Past medical history is significant for thyroid disease  Family history is significant for hypertension liver disease  Surgical history is significant for thyroidectomy because of thyroid cancer   Social history patient is not smoker not alcohol abuser does not use drugs  Spouse's history is significant for problems with erection and hypertension  Assessment:  Trying to conceive for the last year   Regular cycles  Status post thyroidectomy  Male factor   Plan:  Check ovarian function, prolactin, prenatal testing, and vitamin D  HSG  Co Q10 for the    Answered all questions at length   A total of 45 minutes was spent with the couple via video including pre charting, talking to the couple, dictating, and ordering  
Negative

## 2024-03-02 NOTE — H&P ADULT - NSICDXPASTMEDICALHX_GEN_ALL_CORE_FT
PAST MEDICAL HISTORY:  DM (diabetes mellitus)     HFrEF (heart failure with reduced ejection fraction)     HLD (hyperlipidemia)     HTN (hypertension)

## 2024-03-02 NOTE — ED ADULT NURSE NOTE - NSFALLDEVICES_ED_ALL_ED
How Severe Is Your Skin Lesion?: mild Have Your Skin Lesions Been Treated?: not been treated Is This A New Presentation, Or A Follow-Up?: Growth None

## 2024-03-02 NOTE — ED ADULT NURSE NOTE - NSFALLRISKINTERV_ED_ALL_ED
Assistance OOB with selected safe patient handling equipment if applicable/Assistance with ambulation/Communicate fall risk and risk factors to all staff, patient, and family/Monitor gait and stability/Provide visual cue: yellow wristband, yellow gown, etc/Reinforce activity limits and safety measures with patient and family/Call bell, personal items and telephone in reach/Instruct patient to call for assistance before getting out of bed/chair/stretcher/Non-slip footwear applied when patient is off stretcher/[place] to call system/Physically safe environment - no spills, clutter or unnecessary equipment/Purposeful Proactive Rounding/Room/bathroom lighting operational, light cord in reach

## 2024-03-02 NOTE — ED ADULT NURSE NOTE - NSFALLRISKASMT_ED_ALL_ED_DT
January 23, 2024      [facility]  [address] [facility] [address] [address]  Phone: [phone]  Fax: [phone]       Patient: Dayna Anaya   YOB: 2019  Date of Visit: 01/23/2024    To Whom It May Concern:    Jill Anaya  was at [facility] on 01/23/2024. The patient may return to work/school on 1/25/2024 with no restrictions. If you have any questions or concerns, or if I can be of further assistance, please do not hesitate to contact me.    Sincerely,    Briana Wade, DNP     
02-Mar-2024 12:23

## 2024-03-02 NOTE — PATIENT PROFILE ADULT - FALL HARM RISK - HARM RISK INTERVENTIONS
Assistance with ambulation/Assistance OOB with selected safe patient handling equipment/Communicate Risk of Fall with Harm to all staff/Discuss with provider need for PT consult/Monitor gait and stability/Reinforce activity limits and safety measures with patient and family/Tailored Fall Risk Interventions/Visual Cue: Yellow wristband and red socks/Bed in lowest position, wheels locked, appropriate side rails in place/Call bell, personal items and telephone in reach/Instruct patient to call for assistance before getting out of bed or chair/Non-slip footwear when patient is out of bed/[place] to call system/Physically safe environment - no spills, clutter or unnecessary equipment/Purposeful Proactive Rounding/Room/bathroom lighting operational, light cord in reach

## 2024-03-02 NOTE — ED PROVIDER NOTE - OBJECTIVE STATEMENT
63M w/ hx htn, chf (EF 33% 3/2023), dm presenting with sob. Pt has had progressive sob and diffuse pitting edema for months. Has not been taking his meds, has not visited any physicians since discharge last year. No fever, cp, abd pain, n/v.

## 2024-03-02 NOTE — H&P ADULT - NSHPREVIEWOFSYSTEMS_GEN_ALL_CORE
REVIEW OF SYSTEMS:    CONSTITUTIONAL: + weakness, no fevers or chills  EYES/ENT: No visual changes;  No dysphagia; No sore throat; No rhinorrhea; No sinus pain/pressure  NECK: No pain or stiffness  RESPIRATORY: No cough, wheezing, hemoptysis; + shortness of breath  CARDIOVASCULAR: No chest pain or palpitations; + lower extremity edema  GASTROINTESTINAL: No abdominal or epigastric pain. No nausea, vomiting, or hematemesis; No diarrhea or constipation. No melena or hematochezia.  GENITOURINARY: No dysuria, frequency or hematuria  NEUROLOGICAL: No numbness or weakness  MSK: ambulates with his brothers walker intermittently   SKIN: No itching, burning, rashes, or lesions   All other review of systems is negative unless indicated above.

## 2024-03-02 NOTE — H&P ADULT - PROBLEM SELECTOR PLAN 1
New  ROSS + bilateral pitting edema, Congestion on CXR, proBNP [id number]  - TTE 3/2023: EF 33%  - EKG ordered  - Not compliant  with home regimen of lasix 40mg daily, metoprolol XL 50mg daily, hydralazine 25mg TID (Does not appear to be on GDMT)  * echo ordered  * s/p lasix 40mg IV x1- will order another dose tonight, from tomorrow lasix 40mg IV daily   * strict I/O, monitor daily weights, fluid restriction     * holding aspirin 81mg daily pending CT head as pt reported 2 episodes where he hit his head and had LOC  * Cardiology to see pt tomorrow as admitted under Dr. Zavala

## 2024-03-02 NOTE — PATIENT PROFILE ADULT - SURGICAL SITE INCISION
Events Overnight    HPI:  This patient is a 67 year old male with PMH significant for:                     Hodgkings Disease treated with Radiation and RT                     CAD - s/p cath in 2009, cabg x 4                     chronic pleural effusion Right chest(s/p VATS 2016)                     aspiration Pneumonia 2016,                      ESRD on Dialysis since 2016                     Rash to vanco, or zoloft on steroid taper                     chronic vent dependence                     hypothyroidism  yesterdat in nursing home developed increased HR with cyanosis sat ok  In ED there was high peak pressures, trach may have been positional but wbc has increased    9/01 - still had episode high peak pressure this am, trach repositioned better           ct chest right effusion, some infiltrate likely chronic           wbc ed to 21     PAST MEDICAL & SURGICAL HISTORY:  Hypothyroidism  ESRD on hemodialysis  Angina pectoris  Hodgkin's lymphoma  Tracheostomy dependent  Anemia  CHF (congestive heart failure)  H/O tracheostomy       MEDICATIONS  (STANDING):  cefepime  IVPB 1000 milliGRAM(s) IV Intermittent every 12 hours  metoprolol 12.5 milliGRAM(s) Oral two times a day  torsemide 20 milliGRAM(s) Oral two times a day  pantoprazole    Tablet 40 milliGRAM(s) Oral before breakfast  levothyroxine 150 MICROGram(s) Oral daily  heparin  Injectable 5000 Unit(s) SubCutaneous every 12 hours  predniSONE   Tablet 50 milliGRAM(s) Oral daily    MEDICATIONS  (PRN):      Allergies    epinephrine (Unknown)  fentanyl (Unknown)  Reglan (Unknown)  Vancomycin Hydrochloride (Rash)  Zoloft (Unknown)    Intolerances              Neuro: awake alert cooperatie    CV: Vital Signs Last 24 Hrs  T(C): 35.9 (01 Sep 2017 08:49), Max: 36.4 (01 Sep 2017 06:17)  T(F): 96.6 (01 Sep 2017 08:49), Max: 97.5 (01 Sep 2017 06:17)  HR: 89 (31 Aug 2017 23:00) (88 - 127)  BP: 111/70 (31 Aug 2017 23:00) (84/52 - 154/79)  BP(mean): 78 (31 Aug 2017 23:00) (59 - 78)  RR: 17 (31 Aug 2017 23:00) (5 - 24)  SpO2: 100% (31 Aug 2017 23:00) (97% - 100%)                Respiratory: dec bs on right  Mode: AC/ CMV (Assist Control/ Continuous Mandatory Ventilation)  RR (machine): 14  TV (machine): 400  FiO2: 40  PEEP: 5  ITime: 1  PC: 25  PIP: 30    GI soft tolerating tube feeds    CXR: right effusion      Renal  I&O's Summary    31 Aug 2017 07:01  -  01 Sep 2017 07:00  --------------------------------------------------------  IN: 183 mL / OUT: 1150 mL / NET: -967 mL      09-01    136  |  94<L>  |  49<H>  ----------------------------<  107<H>  4.0   |  32<H>  |  2.25<H>    Ca    9.0      01 Sep 2017 05:16  Phos  4.3     09-01    TPro  x   /  Alb  2.3<L>  /  TBili  x   /  DBili  x   /  AST  x   /  ALT  x   /  AlkPhos  x   09-01      GI: toleratiing vital add water flush     Nutrition    Heme:                           9.9    21.8  )-----------( 294      ( 01 Sep 2017 05:16 )             30.1          ID: cefepime ordered, patient refused, check sputum culture          DVT Prophylaxis: patient refused        Advanced Directives: full code no

## 2024-03-02 NOTE — ED ADULT TRIAGE NOTE - CHIEF COMPLAINT QUOTE
c/o worsening SOB for 1 month, pt with shallow rapid respirations and grunting, communicates with 1-2 word phrases. B/l LE edema noted +4. Phx fo CHF, DM.

## 2024-03-02 NOTE — H&P ADULT - PROBLEM SELECTOR PLAN 3
Chronic moderate exacerbation     Hold metformin 1g BID  LDCS with diabetic diet  Consider starting pt on Jardiance/Farxiga given HFrEF

## 2024-03-02 NOTE — ED ADULT NURSE NOTE - OBJECTIVE STATEMENT
Pt reports worsening sob for the past month with it worst over the past couple of days. Pt reports hx of CHF on lasix and other medication and reports he hasn't really being taking all his medication over the past 3 days. Pt placed on continuous cardiac monitor, pulse ox, removed from O2 and O2 sat wnl on RA. Tachypnea noted while exerting himself while getting undressed. Edema noted to b/l lower and upper extremities, abd distended and firm. Labs drawn, IV established, and medication administered.

## 2024-03-02 NOTE — ED ADULT NURSE NOTE - RESPIRATORY ASSESSMENT
You can access the FollowMyHealth Patient Portal offered by [facility] by registering at the following website: [email or web address]. By joining [facility]’s FollowMyHealth portal, you will also be able to view your health information using other applications (apps) compatible with our system.
- - -

## 2024-03-02 NOTE — ED ADULT NURSE NOTE - NSFALLOOBATTEMPT_ED_ALL_ED
Alyce called requesting a refill of the below medication which has been pended for you:     Requested Prescriptions     Pending Prescriptions Disp Refills    DULoxetine (CYMBALTA) 30 MG extended release capsule [Pharmacy Med Name: DULOXETINE HCL 30MG CAPSULE DELAYED RELEASE PARTICLES] 90 capsule 2     Sig: TAKE 1 CAPSULE BY MOUTH NIGHTLY       Last Appointment Date: 11/17/2022  Next Appointment Date: Visit date not found    Allergies   Allergen Reactions    Darvocet [Propoxyphene N-Acetaminophen]      Talking out of her head    Morphine      Category: Allergy;     Morphine And Related Itching and Swelling    Propoxyphene Swelling      
No

## 2024-03-02 NOTE — ED PROVIDER NOTE - CLINICAL SUMMARY MEDICAL DECISION MAKING FREE TEXT BOX
63M w/ hx htn, chf (EF 33% 3/2023), dm presenting with sob. Pt has had progressive sob and diffuse pitting edema for months. Has not been taking his meds, has not visited any physicians since discharge last year. No fever, cp, abd pain, n/v. Exam shows diffuse pitting edema in all extremities, abdomen. c/f acute on chronic chf exacerbation. will get labs, diurese. 63M w/ hx htn, chf (EF 33% 3/2023), dm presenting with sob. Pt has had progressive sob and diffuse pitting edema for months. Has not been taking his meds, has not visited any physicians since discharge last year. No fever, cp, abd pain, n/v. Exam shows diffuse pitting edema in all extremities, abdomen. c/f acute on chronic chf exacerbation. will get labs, diurese.    yosef: 63-year-old man comes in with total body anasarca and shortness of breath.  Patient with a history of hypertension CHF EF is 33% from March 2023.  Also has a history of diabetes.  He has had worsening pitting edema and dyspnea for months he has not been taking any of his medications or visiting any physician since his discharge last year.  He was admitted for acute CHF patient denies fever nausea vomiting.  When I asked him why he is not taking his medications and he said sometimes when he feels better he does not feel like he needs it.    Vital signs include blood pressure 170/130 on presentation heart rate 115 afebrile O2 sat 99 on room air   Pt alert cannot complete sentences completely appears short of breath and tachypneic  h at/nc  perrl, conj clear, sclera anicteric,  neck supple  abd soft no r/g/t but has anasarca  ext deep pitting edema no deformities  neueo awake, lucid normal gait moves all extremities with strength  psych jocular perhaps inappropriately    Patient will require admission.  Clearly has some aspects of CHF.  Lab reveals bili elevated BNP of 11,000 lactate of 2.4 negative for SARS flu A flu B or RSV ultrasound consistent with positive B-lines.  Patient also given Lasix

## 2024-03-02 NOTE — H&P ADULT - HISTORY OF PRESENT ILLNESS
63 yr old male with a pmh of HTN, HF rEF ( EF33%), T2DM on metformin who presents with ROSS and bilateral lower extremity edema with his lefgs feeling heavy. This has been progressing since just after Thanksgiving 2023. Pt endorses 2 falls (one in the beginning of December and the other toward the end) where he fell due to weakness and had LOC for a few seconds. He presented to and [facility] where he was discharged home (states they did not do any tests and when we tried to log in to his portal he did not know the username/password).   Pt states he is not always compliant with his medications.   Denies  headache, dizziness, chest pain, palpitations, abdominal pain, joint pain, diarrhea/constipation, urinary symptoms.   Vitals: T 97.7, , /130-> 148/106, RR 16 satting 99% RA

## 2024-03-02 NOTE — ED PROVIDER NOTE - ATTENDING CONTRIBUTION TO CARE
yosef: 63-year-old man comes in with total body anasarca and shortness of breath.  Patient with a history of hypertension CHF EF is 33% from March 2023.  Also has a history of diabetes.  He has had worsening pitting edema and dyspnea for months he has not been taking any of his medications or visiting any physician since his discharge last year.  He was admitted for acute CHF patient denies fever nausea vomiting.  When I asked him why he is not taking his medications and he said sometimes when he feels better he does not feel like he needs it.    Vital signs include blood pressure 170/130 on presentation heart rate 115 afebrile O2 sat 99 on room air   Pt alert cannot complete sentences completely appears short of breath and tachypneic  h at/nc  perrl, conj clear, sclera anicteric,  neck supple  abd soft no r/g/t but has anasarca  ext deep pitting edema no deformities  neueo awake, lucid normal gait moves all extremities with strength  psych jocular perhaps inappropriately    Patient will require admission.  Clearly has some aspects of CHF.  Lab reveals bili elevated BNP of 11,000 lactate of 2.4 negative for SARS flu A flu B or RSV ultrasound consistent with positive B-lines.  Patient also given Lasix    I performed a history and physical exam of the patient and discussed their management with the resident and /or advanced care provider. I personally made/approved the management plan and take responsibility for the patient management. I reviewed the resident and /or ACP's note and agree with the documented findings and plan of care. My medical decison making and observations are found above.

## 2024-03-02 NOTE — ED PROVIDER NOTE - PHYSICAL EXAMINATION
General appearance: NAD, conversant, afebrile    Eyes: anicteric sclerae, CHATA, EOMI   HENT: Atraumatic; oropharynx clear, MMM and no ulcerations, no pharyngeal erythema or exudate   Neck: Trachea midline; Full range of motion, supple   Pulm: CTA bl, normal respiratory effort and no intercostal retractions, normal work of breathing   CV: RRR, No murmurs, rubs, or gallops. 2+ peripheral pulses.   Abdomen: Soft, non-tender, non-distended; no guarding or rebound   Extremities: 5/5 strength in all four extremities.   Skin: diffuse pitting edema to BLE, BUE, abdomen   Psych: Appropriate affect, cooperative; alert and oriented to person, place and time

## 2024-03-02 NOTE — PATIENT PROFILE ADULT - NSPROHMSYMPCOND_GEN_A_NUR
Patient states that he is sometimes noncompliant with physician's appointments and medications/diabetes

## 2024-03-02 NOTE — H&P ADULT - NSHPPHYSICALEXAM_GEN_ALL_CORE
PHYSICAL EXAM:  GENERAL: NAD, comfortable at bedside   HEAD:  Atraumatic, Normocephalic  EYES: EOMI, PERRL, conjunctiva and sclera clear  NECK: Supple, No JVD  CHEST/LUNG: decreased air entry at bilateral bases; No wheezes, rales or rhonchi  HEART: Regular rate and rhythm; No murmurs, rubs, or gallops, (+)S1, S2  ABDOMEN: Soft, Nontender, Nondistended; Normal Bowel sounds   EXTREMITIES:  2+ Peripheral Pulses, No clubbing, cyanosis, 2-3+ bilateral pitting edema of the lower extremities  PSYCH: normal mood and affect  NEUROLOGY: AAOx3, moving all extremities spontaneously   SKIN: No rashes or lesions

## 2024-03-02 NOTE — ED ADULT NURSE NOTE - CAS TRG GENERAL NORM CIRC DET
normal,  alert,  in no acute distress,  well developed, well nourished,  ambulating without difficulty,  normal communication ability Strong peripheral pulses

## 2024-03-03 LAB
A1C WITH ESTIMATED AVERAGE GLUCOSE RESULT: 7.1 % — HIGH (ref 4–5.6)
ALBUMIN SERPL ELPH-MCNC: 3 G/DL — LOW (ref 3.3–5)
ALP SERPL-CCNC: 260 U/L — HIGH (ref 40–120)
ALT FLD-CCNC: 13 U/L — SIGNIFICANT CHANGE UP (ref 4–41)
ANION GAP SERPL CALC-SCNC: 14 MMOL/L — SIGNIFICANT CHANGE UP (ref 7–14)
AST SERPL-CCNC: 24 U/L — SIGNIFICANT CHANGE UP (ref 4–40)
BASOPHILS # BLD AUTO: 0.02 K/UL — SIGNIFICANT CHANGE UP (ref 0–0.2)
BASOPHILS NFR BLD AUTO: 0.4 % — SIGNIFICANT CHANGE UP (ref 0–2)
BILIRUB SERPL-MCNC: 1.2 MG/DL — SIGNIFICANT CHANGE UP (ref 0.2–1.2)
BUN SERPL-MCNC: 17 MG/DL — SIGNIFICANT CHANGE UP (ref 7–23)
CALCIUM SERPL-MCNC: 8.8 MG/DL — SIGNIFICANT CHANGE UP (ref 8.4–10.5)
CHLORIDE SERPL-SCNC: 103 MMOL/L — SIGNIFICANT CHANGE UP (ref 98–107)
CHOLEST SERPL-MCNC: 111 MG/DL — SIGNIFICANT CHANGE UP
CO2 SERPL-SCNC: 26 MMOL/L — SIGNIFICANT CHANGE UP (ref 22–31)
CREAT SERPL-MCNC: 0.98 MG/DL — SIGNIFICANT CHANGE UP (ref 0.5–1.3)
EGFR: 86 ML/MIN/1.73M2 — SIGNIFICANT CHANGE UP
EOSINOPHIL # BLD AUTO: 0.17 K/UL — SIGNIFICANT CHANGE UP (ref 0–0.5)
EOSINOPHIL NFR BLD AUTO: 3.7 % — SIGNIFICANT CHANGE UP (ref 0–6)
ESTIMATED AVERAGE GLUCOSE: 157 — SIGNIFICANT CHANGE UP
GLUCOSE BLDC GLUCOMTR-MCNC: 107 MG/DL — HIGH (ref 70–99)
GLUCOSE BLDC GLUCOMTR-MCNC: 117 MG/DL — HIGH (ref 70–99)
GLUCOSE BLDC GLUCOMTR-MCNC: 138 MG/DL — HIGH (ref 70–99)
GLUCOSE BLDC GLUCOMTR-MCNC: 156 MG/DL — HIGH (ref 70–99)
GLUCOSE SERPL-MCNC: 106 MG/DL — HIGH (ref 70–99)
HCT VFR BLD CALC: 37 % — LOW (ref 39–50)
HDLC SERPL-MCNC: 40 MG/DL — LOW
HGB BLD-MCNC: 12.1 G/DL — LOW (ref 13–17)
IANC: 2.72 K/UL — SIGNIFICANT CHANGE UP (ref 1.8–7.4)
IMM GRANULOCYTES NFR BLD AUTO: 0.4 % — SIGNIFICANT CHANGE UP (ref 0–0.9)
LIPID PNL WITH DIRECT LDL SERPL: 58 MG/DL — SIGNIFICANT CHANGE UP
LYMPHOCYTES # BLD AUTO: 1.03 K/UL — SIGNIFICANT CHANGE UP (ref 1–3.3)
LYMPHOCYTES # BLD AUTO: 22.6 % — SIGNIFICANT CHANGE UP (ref 13–44)
MAGNESIUM SERPL-MCNC: 1.8 MG/DL — SIGNIFICANT CHANGE UP (ref 1.6–2.6)
MCHC RBC-ENTMCNC: 29.5 PG — SIGNIFICANT CHANGE UP (ref 27–34)
MCHC RBC-ENTMCNC: 32.7 GM/DL — SIGNIFICANT CHANGE UP (ref 32–36)
MCV RBC AUTO: 90.2 FL — SIGNIFICANT CHANGE UP (ref 80–100)
MONOCYTES # BLD AUTO: 0.6 K/UL — SIGNIFICANT CHANGE UP (ref 0–0.9)
MONOCYTES NFR BLD AUTO: 13.2 % — SIGNIFICANT CHANGE UP (ref 2–14)
NEUTROPHILS # BLD AUTO: 2.72 K/UL — SIGNIFICANT CHANGE UP (ref 1.8–7.4)
NEUTROPHILS NFR BLD AUTO: 59.7 % — SIGNIFICANT CHANGE UP (ref 43–77)
NON HDL CHOLESTEROL: 71 MG/DL — SIGNIFICANT CHANGE UP
NRBC # BLD: 0 /100 WBCS — SIGNIFICANT CHANGE UP (ref 0–0)
NRBC # FLD: 0 K/UL — SIGNIFICANT CHANGE UP (ref 0–0)
PLATELET # BLD AUTO: 247 K/UL — SIGNIFICANT CHANGE UP (ref 150–400)
POTASSIUM SERPL-MCNC: 3.4 MMOL/L — LOW (ref 3.5–5.3)
POTASSIUM SERPL-SCNC: 3.4 MMOL/L — LOW (ref 3.5–5.3)
PROT SERPL-MCNC: 6.7 G/DL — SIGNIFICANT CHANGE UP (ref 6–8.3)
RBC # BLD: 4.1 M/UL — LOW (ref 4.2–5.8)
RBC # FLD: 15.9 % — HIGH (ref 10.3–14.5)
SODIUM SERPL-SCNC: 143 MMOL/L — SIGNIFICANT CHANGE UP (ref 135–145)
TRIGL SERPL-MCNC: 63 MG/DL — SIGNIFICANT CHANGE UP
WBC # BLD: 4.56 K/UL — SIGNIFICANT CHANGE UP (ref 3.8–10.5)
WBC # FLD AUTO: 4.56 K/UL — SIGNIFICANT CHANGE UP (ref 3.8–10.5)

## 2024-03-03 PROCEDURE — 70450 CT HEAD/BRAIN W/O DYE: CPT | Mod: 26

## 2024-03-03 PROCEDURE — 93010 ELECTROCARDIOGRAM REPORT: CPT

## 2024-03-03 RX ORDER — METOPROLOL TARTRATE 50 MG
75 TABLET ORAL DAILY
Refills: 0 | Status: DISCONTINUED | OUTPATIENT
Start: 2024-03-04 | End: 2024-03-04

## 2024-03-03 RX ORDER — MAGNESIUM SULFATE 500 MG/ML
1 VIAL (ML) INJECTION ONCE
Refills: 0 | Status: COMPLETED | OUTPATIENT
Start: 2024-03-03 | End: 2024-03-03

## 2024-03-03 RX ORDER — METOPROLOL TARTRATE 50 MG
25 TABLET ORAL ONCE
Refills: 0 | Status: COMPLETED | OUTPATIENT
Start: 2024-03-03 | End: 2024-03-03

## 2024-03-03 RX ORDER — POTASSIUM CHLORIDE 20 MEQ
40 PACKET (EA) ORAL ONCE
Refills: 0 | Status: COMPLETED | OUTPATIENT
Start: 2024-03-03 | End: 2024-03-03

## 2024-03-03 RX ORDER — SACUBITRIL AND VALSARTAN 24; 26 MG/1; MG/1
1 TABLET, FILM COATED ORAL
Refills: 0 | Status: COMPLETED | OUTPATIENT
Start: 2024-03-03 | End: 2024-03-06

## 2024-03-03 RX ADMIN — Medication 40 MILLIEQUIVALENT(S): at 09:50

## 2024-03-03 RX ADMIN — SACUBITRIL AND VALSARTAN 1 TABLET(S): 24; 26 TABLET, FILM COATED ORAL at 17:18

## 2024-03-03 RX ADMIN — Medication 25 MILLIGRAM(S): at 05:15

## 2024-03-03 RX ADMIN — Medication 25 MILLIGRAM(S): at 13:15

## 2024-03-03 RX ADMIN — Medication 25 MILLIGRAM(S): at 21:58

## 2024-03-03 RX ADMIN — Medication 100 GRAM(S): at 09:51

## 2024-03-03 RX ADMIN — Medication 50 MILLIGRAM(S): at 05:11

## 2024-03-03 RX ADMIN — Medication 40 MILLIGRAM(S): at 05:11

## 2024-03-03 RX ADMIN — Medication 25 MILLIGRAM(S): at 11:58

## 2024-03-03 NOTE — PROVIDER CONTACT NOTE (OTHER) - RECOMMENDATIONS
ACP notified. Toprol 25mg ordered as one time dose. Will continue to monitor until end of shift
As per ACP Melissa Garcia. Transport patient to floor with zoll.
ACP notified. Will continue to monitor until end of shift
Continue telemetry monitoring and monitor electrolytes
Tele monitoring

## 2024-03-03 NOTE — CHART NOTE - NSCHARTNOTEFT_GEN_A_CORE
Hospitalist Medicine NP     Call to  house service to request consultation this morning to [phone], consult accepted.   Will follow up henry.       ELBA [facility]  Medicine [phone]

## 2024-03-03 NOTE — PHYSICAL THERAPY INITIAL EVALUATION ADULT - PERTINENT HX OF CURRENT PROBLEM, REHAB EVAL
Patient is 63 year old male, history of HTN, HF rEF ( EF33%), T2DM on metformin who presents with ROSS and bilateral lower extremity edema with his left feeling heavy.

## 2024-03-03 NOTE — PHYSICAL THERAPY INITIAL EVALUATION ADULT - ADDITIONAL COMMENTS
Patient report lives with mother and brother in house, 5 steps to enter, 14 stairs indoor, ambulated with out assistive device.

## 2024-03-03 NOTE — PROGRESS NOTE ADULT - SUBJECTIVE AND OBJECTIVE BOX
Patient is a 63y old  Male who presents with a chief complaint of Acute decompensated heart failure (03 Mar 2024 13:51)      INTERVAL HPI/OVERNIGHT EVENTS: seen and examined   T(C): 36.3 (03-03-24 @ 19:58), Max: 37 (03-03-24 @ 13:15)  HR: 91 (03-03-24 @ 19:58) (90 - 100)  BP: 132/90 (03-03-24 @ 19:58) (128/82 - 148/96)  RR: 18 (03-03-24 @ 19:58) (18 - 18)  SpO2: 98% (03-03-24 @ 19:58) (95% - 100%)  Wt(kg): --  I&O's Summary    02 Mar 2024 07:01  -  03 Mar 2024 07:00  --------------------------------------------------------  IN: 250 mL / OUT: 1200 mL / NET: -950 mL    03 Mar 2024 07:01  -  03 Mar 2024 23:33  --------------------------------------------------------  IN: 300 mL / OUT: 1100 mL / NET: -800 mL        PAST MEDICAL & SURGICAL HISTORY:  HTN (hypertension)      HLD (hyperlipidemia)      DM (diabetes mellitus)      HFrEF (heart failure with reduced ejection fraction)      No significant past surgical history          SOCIAL HISTORY  Alcohol:  Tobacco:  Illicit substance use:    FAMILY HISTORY:    REVIEW OF SYSTEMS:  CONSTITUTIONAL: No fever, weight loss, or fatigue  EYES: No eye pain, visual disturbances, or discharge  ENMT:  No difficulty hearing, tinnitus, vertigo; No sinus or throat pain  NECK: No pain or stiffness  RESPIRATORY: No cough, wheezing, chills or hemoptysis; No shortness of breath  CARDIOVASCULAR: No chest pain, palpitations, dizziness, or leg swelling  GASTROINTESTINAL: No abdominal or epigastric pain. No nausea, vomiting, or hematemesis; No diarrhea or constipation. No melena or hematochezia.  GENITOURINARY: No dysuria, frequency, hematuria, or incontinence  NEUROLOGICAL: No headaches, memory loss, loss of strength, numbness, or tremors  SKIN: No itching, burning, rashes, or lesions   LYMPH NODES: No enlarged glands  ENDOCRINE: No heat or cold intolerance; No hair loss  MUSCULOSKELETAL: No joint pain or swelling; No muscle, back, or extremity pain  PSYCHIATRIC: No depression, anxiety, mood swings, or difficulty sleeping  HEME/LYMPH: No easy bruising, or bleeding gums  ALLERY AND IMMUNOLOGIC: No hives or eczema    RADIOLOGY & ADDITIONAL TESTS:    Imaging Personally Reviewed:  [ ] YES  [ ] NO    Consultant(s) Notes Reviewed:  [ ] YES  [ ] NO    PHYSICAL EXAM:  GENERAL: NAD, well-groomed, well-developed  HEAD:  Atraumatic, Normocephalic  EYES: EOMI, PERRLA, conjunctiva and sclera clear  ENMT: No tonsillar erythema, exudates, or enlargement; Moist mucous membranes, Good dentition, No lesions  NECK: Supple, No JVD, Normal thyroid  NERVOUS SYSTEM:  Alert & Oriented X3, Good concentration; Motor Strength 5/5 B/L upper and lower extremities; DTRs 2+ intact and symmetric  CHEST/LUNG: Clear to percussion bilaterally; No rales, rhonchi, wheezing, or rubs  HEART: Regular rate and rhythm; No murmurs, rubs, or gallops  ABDOMEN: Soft, Nontender, Nondistended; Bowel sounds present  EXTREMITIES:  2+ Peripheral Pulses, No clubbing, cyanosis, or edema  LYMPH: No lymphadenopathy noted  SKIN: No rashes or lesions    LABS:                        12.1   4.56  )-----------( 247      ( 03 Mar 2024 06:47 )             37.0     03-03    143  |  103  |  17  ----------------------------<  106<H>  3.4<L>   |  26  |  0.98    Ca    8.8      03 Mar 2024 06:47  Mg     1.80     03-03    TPro  6.7  /  Alb  3.0<L>  /  TBili  1.2  /  DBili  x   /  AST  24  /  ALT  13  /  AlkPhos  260<H>  03-03      Urinalysis Basic - ( 03 Mar 2024 06:47 )    Color: x / Appearance: x / SG: x / pH: x  Gluc: 106 mg/dL / Ketone: x  / Bili: x / Urobili: x   Blood: x / Protein: x / Nitrite: x   Leuk Esterase: x / RBC: x / WBC x   Sq Epi: x / Non Sq Epi: x / Bacteria: x      CAPILLARY BLOOD GLUCOSE  138 (03 Mar 2024 17:50)      POCT Blood Glucose.: 156 mg/dL (03 Mar 2024 22:42)  POCT Blood Glucose.: 138 mg/dL (03 Mar 2024 17:50)  POCT Blood Glucose.: 117 mg/dL (03 Mar 2024 12:40)  POCT Blood Glucose.: 107 mg/dL (03 Mar 2024 08:44)        Urinalysis Basic - ( 03 Mar 2024 06:47 )    Color: x / Appearance: x / SG: x / pH: x  Gluc: 106 mg/dL / Ketone: x  / Bili: x / Urobili: x   Blood: x / Protein: x / Nitrite: x   Leuk Esterase: x / RBC: x / WBC x   Sq Epi: x / Non Sq Epi: x / Bacteria: x        MEDICATIONS  (STANDING):  dextrose 5%. 1000 milliLiter(s) (100 mL/Hr) IV Continuous <Continuous>  dextrose 5%. 1000 milliLiter(s) (50 mL/Hr) IV Continuous <Continuous>  dextrose 50% Injectable 25 Gram(s) IV Push once  dextrose 50% Injectable 25 Gram(s) IV Push once  dextrose 50% Injectable 12.5 Gram(s) IV Push once  furosemide   Injectable 40 milliGRAM(s) IV Push daily  glucagon  Injectable 1 milliGRAM(s) IntraMuscular once  hydrALAZINE 25 milliGRAM(s) Oral three times a day  influenza   Vaccine 0.5 milliLiter(s) IntraMuscular once  insulin lispro (ADMELOG) corrective regimen sliding scale   SubCutaneous at bedtime  insulin lispro (ADMELOG) corrective regimen sliding scale   SubCutaneous three times a day before meals  sacubitril 24 mG/valsartan 26 mG 1 Tablet(s) Oral two times a day    MEDICATIONS  (PRN):  acetaminophen     Tablet .. 650 milliGRAM(s) Oral every 6 hours PRN Temp greater or equal to 38C (100.4F), Mild Pain (1 - 3)  aluminum hydroxide/magnesium hydroxide/simethicone Suspension 30 milliLiter(s) Oral every 4 hours PRN Dyspepsia  dextrose Oral Gel 15 Gram(s) Oral once PRN Blood Glucose LESS THAN 70 milliGRAM(s)/deciliter  melatonin 3 milliGRAM(s) Oral at bedtime PRN Insomnia  ondansetron Injectable 4 milliGRAM(s) IV Push every 8 hours PRN Nausea and/or Vomiting      Care Discussed with Consultants/Other Providers [ ] YES  [ ] NO

## 2024-03-04 ENCOUNTER — RESULT REVIEW (OUTPATIENT)
Age: 64
End: 2024-03-04

## 2024-03-04 LAB
ALBUMIN SERPL ELPH-MCNC: 2.8 G/DL — LOW (ref 3.3–5)
ALP SERPL-CCNC: 282 U/L — HIGH (ref 40–120)
ALT FLD-CCNC: 12 U/L — SIGNIFICANT CHANGE UP (ref 4–41)
ANION GAP SERPL CALC-SCNC: 11 MMOL/L — SIGNIFICANT CHANGE UP (ref 7–14)
ANION GAP SERPL CALC-SCNC: 11 MMOL/L — SIGNIFICANT CHANGE UP (ref 7–14)
AST SERPL-CCNC: 21 U/L — SIGNIFICANT CHANGE UP (ref 4–40)
BILIRUB SERPL-MCNC: 0.8 MG/DL — SIGNIFICANT CHANGE UP (ref 0.2–1.2)
BUN SERPL-MCNC: 22 MG/DL — SIGNIFICANT CHANGE UP (ref 7–23)
BUN SERPL-MCNC: 22 MG/DL — SIGNIFICANT CHANGE UP (ref 7–23)
CALCIUM SERPL-MCNC: 8.5 MG/DL — SIGNIFICANT CHANGE UP (ref 8.4–10.5)
CALCIUM SERPL-MCNC: 8.5 MG/DL — SIGNIFICANT CHANGE UP (ref 8.4–10.5)
CHLORIDE SERPL-SCNC: 103 MMOL/L — SIGNIFICANT CHANGE UP (ref 98–107)
CHLORIDE SERPL-SCNC: 103 MMOL/L — SIGNIFICANT CHANGE UP (ref 98–107)
CO2 SERPL-SCNC: 26 MMOL/L — SIGNIFICANT CHANGE UP (ref 22–31)
CO2 SERPL-SCNC: 27 MMOL/L — SIGNIFICANT CHANGE UP (ref 22–31)
CREAT SERPL-MCNC: 1.04 MG/DL — SIGNIFICANT CHANGE UP (ref 0.5–1.3)
CREAT SERPL-MCNC: 1.07 MG/DL — SIGNIFICANT CHANGE UP (ref 0.5–1.3)
EGFR: 77 ML/MIN/1.73M2 — SIGNIFICANT CHANGE UP
EGFR: 80 ML/MIN/1.73M2 — SIGNIFICANT CHANGE UP
GLUCOSE BLDC GLUCOMTR-MCNC: 138 MG/DL — HIGH (ref 70–99)
GLUCOSE BLDC GLUCOMTR-MCNC: 155 MG/DL — HIGH (ref 70–99)
GLUCOSE BLDC GLUCOMTR-MCNC: 157 MG/DL — HIGH (ref 70–99)
GLUCOSE BLDC GLUCOMTR-MCNC: 182 MG/DL — HIGH (ref 70–99)
GLUCOSE SERPL-MCNC: 170 MG/DL — HIGH (ref 70–99)
GLUCOSE SERPL-MCNC: 178 MG/DL — HIGH (ref 70–99)
HCT VFR BLD CALC: 38.2 % — LOW (ref 39–50)
HGB BLD-MCNC: 12.2 G/DL — LOW (ref 13–17)
MAGNESIUM SERPL-MCNC: 1.9 MG/DL — SIGNIFICANT CHANGE UP (ref 1.6–2.6)
MAGNESIUM SERPL-MCNC: 1.9 MG/DL — SIGNIFICANT CHANGE UP (ref 1.6–2.6)
MCHC RBC-ENTMCNC: 29 PG — SIGNIFICANT CHANGE UP (ref 27–34)
MCHC RBC-ENTMCNC: 31.9 GM/DL — LOW (ref 32–36)
MCV RBC AUTO: 90.7 FL — SIGNIFICANT CHANGE UP (ref 80–100)
NRBC # BLD: 0 /100 WBCS — SIGNIFICANT CHANGE UP (ref 0–0)
NRBC # FLD: 0 K/UL — SIGNIFICANT CHANGE UP (ref 0–0)
PHOSPHATE SERPL-MCNC: 3 MG/DL — SIGNIFICANT CHANGE UP (ref 2.5–4.5)
PHOSPHATE SERPL-MCNC: 3.3 MG/DL — SIGNIFICANT CHANGE UP (ref 2.5–4.5)
PLATELET # BLD AUTO: 236 K/UL — SIGNIFICANT CHANGE UP (ref 150–400)
POTASSIUM SERPL-MCNC: 3.5 MMOL/L — SIGNIFICANT CHANGE UP (ref 3.5–5.3)
POTASSIUM SERPL-MCNC: 4 MMOL/L — SIGNIFICANT CHANGE UP (ref 3.5–5.3)
POTASSIUM SERPL-SCNC: 3.5 MMOL/L — SIGNIFICANT CHANGE UP (ref 3.5–5.3)
POTASSIUM SERPL-SCNC: 4 MMOL/L — SIGNIFICANT CHANGE UP (ref 3.5–5.3)
PROT SERPL-MCNC: 6.3 G/DL — SIGNIFICANT CHANGE UP (ref 6–8.3)
RBC # BLD: 4.21 M/UL — SIGNIFICANT CHANGE UP (ref 4.2–5.8)
RBC # FLD: 16.2 % — HIGH (ref 10.3–14.5)
SODIUM SERPL-SCNC: 140 MMOL/L — SIGNIFICANT CHANGE UP (ref 135–145)
SODIUM SERPL-SCNC: 141 MMOL/L — SIGNIFICANT CHANGE UP (ref 135–145)
WBC # BLD: 5.19 K/UL — SIGNIFICANT CHANGE UP (ref 3.8–10.5)
WBC # FLD AUTO: 5.19 K/UL — SIGNIFICANT CHANGE UP (ref 3.8–10.5)

## 2024-03-04 PROCEDURE — 99233 SBSQ HOSP IP/OBS HIGH 50: CPT

## 2024-03-04 PROCEDURE — 93306 TTE W/DOPPLER COMPLETE: CPT | Mod: 26

## 2024-03-04 RX ORDER — FUROSEMIDE 40 MG
40 TABLET ORAL
Refills: 0 | Status: DISCONTINUED | OUTPATIENT
Start: 2024-03-04 | End: 2024-03-06

## 2024-03-04 RX ORDER — MAGNESIUM SULFATE 500 MG/ML
1 VIAL (ML) INJECTION ONCE
Refills: 0 | Status: COMPLETED | OUTPATIENT
Start: 2024-03-04 | End: 2024-03-04

## 2024-03-04 RX ORDER — METOPROLOL TARTRATE 50 MG
100 TABLET ORAL DAILY
Refills: 0 | Status: DISCONTINUED | OUTPATIENT
Start: 2024-03-05 | End: 2024-03-06

## 2024-03-04 RX ADMIN — Medication 1: at 09:04

## 2024-03-04 RX ADMIN — Medication 40 MILLIGRAM(S): at 14:01

## 2024-03-04 RX ADMIN — Medication 40 MILLIGRAM(S): at 04:53

## 2024-03-04 RX ADMIN — Medication 25 MILLIGRAM(S): at 21:19

## 2024-03-04 RX ADMIN — Medication 25 MILLIGRAM(S): at 14:01

## 2024-03-04 RX ADMIN — SACUBITRIL AND VALSARTAN 1 TABLET(S): 24; 26 TABLET, FILM COATED ORAL at 04:52

## 2024-03-04 RX ADMIN — Medication 1: at 18:05

## 2024-03-04 RX ADMIN — Medication 100 GRAM(S): at 14:05

## 2024-03-04 RX ADMIN — Medication 25 MILLIGRAM(S): at 04:53

## 2024-03-04 RX ADMIN — Medication 75 MILLIGRAM(S): at 04:52

## 2024-03-04 RX ADMIN — SACUBITRIL AND VALSARTAN 1 TABLET(S): 24; 26 TABLET, FILM COATED ORAL at 17:07

## 2024-03-04 NOTE — PROGRESS NOTE ADULT - ASSESSMENT
A/P  63 yr old male with a pmh of HTN, HF rEF ( EF33%), T2DM on metformin who presents with ROSS and bilateral lower extremity edema with his legs feeling heavy.     #Acute on chronic systolic heart Failure   c/w diuresis   -started on entresto   c/w coreg   Cardio following   EF <35%    Noncompliance with meds :  -counseling done     #syncope  #NSVT on tele   -EP cardio consult   tele monitoring    #Hypertensive   -BP improved   -continue hydralazine 25mg TID, metoprolol XL 50mg daily and lasix   - entresto added    #DM-2   check A1c   c/w Insulin / FSBS    #HLD  -cont statin    dispo: EP consulted for PEDRITO krishnan , c/w present Rx .

## 2024-03-04 NOTE — PROGRESS NOTE ADULT - SUBJECTIVE AND OBJECTIVE BOX
Patient is a 63y old  Male who presents with a chief complaint of Acute decompensated heart failure (04 Mar 2024 19:28)      INTERVAL HPI/OVERNIGHT EVENTS: seen and examined, doing fair , no CP  T(C): 36.8 (03-04-24 @ 17:05), Max: 36.8 (03-04-24 @ 17:05)  HR: 82 (03-04-24 @ 17:05) (67 - 97)  BP: 128/85 (03-04-24 @ 17:05) (128/85 - 141/89)  RR: 18 (03-04-24 @ 17:05) (16 - 18)  SpO2: 99% (03-04-24 @ 17:05) (96% - 99%)  Wt(kg): --  I&O's Summary    03 Mar 2024 07:01  -  04 Mar 2024 07:00  --------------------------------------------------------  IN: 300 mL / OUT: 1100 mL / NET: -800 mL    04 Mar 2024 07:01  -  04 Mar 2024 23:04  --------------------------------------------------------  IN: 0 mL / OUT: 600 mL / NET: -600 mL        PAST MEDICAL & SURGICAL HISTORY:  HTN (hypertension)      HLD (hyperlipidemia)      DM (diabetes mellitus)      HFrEF (heart failure with reduced ejection fraction)      No significant past surgical history          SOCIAL HISTORY  Alcohol:  Tobacco:  Illicit substance use:    FAMILY HISTORY:    REVIEW OF SYSTEMS:  CONSTITUTIONAL: No fever, weight loss, or fatigue  EYES: No eye pain, visual disturbances, or discharge  ENMT:  No difficulty hearing, tinnitus, vertigo; No sinus or throat pain  NECK: No pain or stiffness  RESPIRATORY: No cough, wheezing, chills or hemoptysis; No shortness of breath  CARDIOVASCULAR: No chest pain, palpitations, dizziness, or leg swelling  GASTROINTESTINAL: No abdominal or epigastric pain. No nausea, vomiting, or hematemesis; No diarrhea or constipation. No melena or hematochezia.  GENITOURINARY: No dysuria, frequency, hematuria, or incontinence  NEUROLOGICAL: No headaches, memory loss, loss of strength, numbness, or tremors  SKIN: No itching, burning, rashes, or lesions   LYMPH NODES: No enlarged glands  ENDOCRINE: No heat or cold intolerance; No hair loss  MUSCULOSKELETAL: No joint pain or swelling; No muscle, back, or extremity pain  PSYCHIATRIC: No depression, anxiety, mood swings, or difficulty sleeping  HEME/LYMPH: No easy bruising, or bleeding gums  ALLERY AND IMMUNOLOGIC: No hives or eczema    RADIOLOGY & ADDITIONAL TESTS:    Imaging Personally Reviewed:  [ ] YES  [ ] NO    Consultant(s) Notes Reviewed:  [ ] YES  [ ] NO    PHYSICAL EXAM:  GENERAL: NAD, well-groomed, well-developed  HEAD:  Atraumatic, Normocephalic  EYES: EOMI, PERRLA, conjunctiva and sclera clear  ENMT: No tonsillar erythema, exudates, or enlargement; Moist mucous membranes, Good dentition, No lesions  NECK: Supple, No JVD, Normal thyroid  NERVOUS SYSTEM:  Alert & Oriented X3, Good concentration; Motor Strength 5/5 B/L upper and lower extremities; DTRs 2+ intact and symmetric  CHEST/LUNG: Clear to percussion bilaterally; No rales, rhonchi, wheezing, or rubs  HEART: Regular rate and rhythm; No murmurs, rubs, or gallops  ABDOMEN: Soft, Nontender, Nondistended; Bowel sounds present  EXTREMITIES:  2+ Peripheral Pulses, No clubbing, cyanosis, or edema  LYMPH: No lymphadenopathy noted  SKIN: No rashes or lesions    LABS:                        12.2   5.19  )-----------( 236      ( 04 Mar 2024 06:12 )             38.2     03-04    141  |  103  |  22  ----------------------------<  178<H>  3.5   |  27  |  1.07    Ca    8.5      04 Mar 2024 06:12  Phos  3.0     03-04  Mg     1.90     03-04    TPro  6.3  /  Alb  2.8<L>  /  TBili  0.8  /  DBili  x   /  AST  21  /  ALT  12  /  AlkPhos  282<H>  03-04      Urinalysis Basic - ( 04 Mar 2024 06:12 )    Color: x / Appearance: x / SG: x / pH: x  Gluc: 178 mg/dL / Ketone: x  / Bili: x / Urobili: x   Blood: x / Protein: x / Nitrite: x   Leuk Esterase: x / RBC: x / WBC x   Sq Epi: x / Non Sq Epi: x / Bacteria: x      CAPILLARY BLOOD GLUCOSE      POCT Blood Glucose.: 157 mg/dL (04 Mar 2024 21:45)  POCT Blood Glucose.: 155 mg/dL (04 Mar 2024 17:30)  POCT Blood Glucose.: 138 mg/dL (04 Mar 2024 12:07)  POCT Blood Glucose.: 182 mg/dL (04 Mar 2024 08:32)        Urinalysis Basic - ( 04 Mar 2024 06:12 )    Color: x / Appearance: x / SG: x / pH: x  Gluc: 178 mg/dL / Ketone: x  / Bili: x / Urobili: x   Blood: x / Protein: x / Nitrite: x   Leuk Esterase: x / RBC: x / WBC x   Sq Epi: x / Non Sq Epi: x / Bacteria: x        MEDICATIONS  (STANDING):  dextrose 5%. 1000 milliLiter(s) (100 mL/Hr) IV Continuous <Continuous>  dextrose 5%. 1000 milliLiter(s) (50 mL/Hr) IV Continuous <Continuous>  dextrose 50% Injectable 25 Gram(s) IV Push once  dextrose 50% Injectable 12.5 Gram(s) IV Push once  dextrose 50% Injectable 25 Gram(s) IV Push once  furosemide   Injectable 40 milliGRAM(s) IV Push two times a day  glucagon  Injectable 1 milliGRAM(s) IntraMuscular once  hydrALAZINE 25 milliGRAM(s) Oral three times a day  influenza   Vaccine 0.5 milliLiter(s) IntraMuscular once  insulin lispro (ADMELOG) corrective regimen sliding scale   SubCutaneous at bedtime  insulin lispro (ADMELOG) corrective regimen sliding scale   SubCutaneous three times a day before meals  sacubitril 24 mG/valsartan 26 mG 1 Tablet(s) Oral two times a day    MEDICATIONS  (PRN):  acetaminophen     Tablet .. 650 milliGRAM(s) Oral every 6 hours PRN Temp greater or equal to 38C (100.4F), Mild Pain (1 - 3)  aluminum hydroxide/magnesium hydroxide/simethicone Suspension 30 milliLiter(s) Oral every 4 hours PRN Dyspepsia  dextrose Oral Gel 15 Gram(s) Oral once PRN Blood Glucose LESS THAN 70 milliGRAM(s)/deciliter  melatonin 3 milliGRAM(s) Oral at bedtime PRN Insomnia  ondansetron Injectable 4 milliGRAM(s) IV Push every 8 hours PRN Nausea and/or Vomiting      Care Discussed with Consultants/Other Providers [ ] YES  [ ] NO

## 2024-03-04 NOTE — CONSULT NOTE ADULT - NS ATTEND AMEND GEN_ALL_CORE FT
NSVT in the setting of acute decompensated HF and significant volume overload. Patient also not adherent to medical therapy at home. Continue diuresis and optimization. Will continue to follow. NSVT likely to resolve with diuresis and medical therapy.

## 2024-03-04 NOTE — PROGRESS NOTE ADULT - ASSESSMENT
ECHO 3/4/23: EF 33% mild MR, Severe global left ventricular systolic dysfunction    A/P  63 yr old male with a pmh of HTN, HF rEF ( EF33%), T2DM on metformin who presents with ROSS and bilateral lower extremity edema with his legs feeling heavy.     #Acute on chronic HFrEF  -volume overloaded  -increase iv lasix to BID   -Not compliant with home meds (lasix 40mg daily, metoprolol XL 50mg daily, hydralazine 25mg TID)  -f/u echo   -strict I/O, monitor daily weights, fluid restriction   -frequent NSVT noted   -increase toprol to 100 mg daily    -eps eval for NSVT, if EF < 35%, ICD/CRTD for cmp, HF  -cw entresto     #syncope, loc  -depressed ed, ivcd, nsvt on tele, up to 31 beats  -eps eval   -ct head unremarkable     #Hypertensive urgency.   -bp improved   -continue hydralazine, BB, entresto, lasix      #T2DM (type 2 diabetes mellitus).   -med f/u     #HLD  -cont statin        dvt ppx

## 2024-03-04 NOTE — PROGRESS NOTE ADULT - SUBJECTIVE AND OBJECTIVE BOX
CARDIOLOGY FOLLOW UP - Dr. Zavala  DATE OF SERVICE: 3/4/24    CC no cp or increase SOB   frequent NSVT noted on tele       REVIEW OF SYSTEMS:  CONSTITUTIONAL: No fever, weight loss, or fatigue  RESPIRATORY: No cough, wheezing, chills or hemoptysis; No Shortness of Breath  CARDIOVASCULAR: No chest pain, palpitations, passing out, dizziness, or leg swelling  GASTROINTESTINAL: No abdominal or epigastric pain. No nausea, vomiting, or hematemesis; No diarrhea or constipation. No melena or hematochezia.  VASCULAR: No edema     PHYSICAL EXAM:  T(C): 36.4 (03-04-24 @ 04:45), Max: 37 (03-03-24 @ 13:15)  HR: 67 (03-04-24 @ 04:45) (67 - 94)  BP: 139/89 (03-04-24 @ 04:45) (128/82 - 151/97)  RR: 16 (03-04-24 @ 04:45) (16 - 18)  SpO2: 96% (03-04-24 @ 04:45) (95% - 100%)  Wt(kg): --  I&O's Summary    03 Mar 2024 07:01  -  04 Mar 2024 07:00  --------------------------------------------------------  IN: 300 mL / OUT: 1100 mL / NET: -800 mL        Appearance: Normal	  Cardiovascular: Normal S1 S2,RRR,  Respiratory:  crackles 	  Gastrointestinal:  Soft, Non-tender, + BS	  Extremities: LE edema ++     Home Medications:  METFORMIN HCL 1,000 MG TABLET: TAKE 1 TABLET BY MOUTH TWICE A DAY WITH MEALS (02 Mar 2024 20:59)  METOPROLOL SUCC ER 50 MG TAB: TAKE 1 TABLET BY MOUTH EVERY DAY (02 Mar 2024 20:59)      MEDICATIONS  (STANDING):  dextrose 5%. 1000 milliLiter(s) (100 mL/Hr) IV Continuous <Continuous>  dextrose 5%. 1000 milliLiter(s) (50 mL/Hr) IV Continuous <Continuous>  dextrose 50% Injectable 25 Gram(s) IV Push once  dextrose 50% Injectable 12.5 Gram(s) IV Push once  dextrose 50% Injectable 25 Gram(s) IV Push once  furosemide   Injectable 40 milliGRAM(s) IV Push daily  glucagon  Injectable 1 milliGRAM(s) IntraMuscular once  hydrALAZINE 25 milliGRAM(s) Oral three times a day  influenza   Vaccine 0.5 milliLiter(s) IntraMuscular once  insulin lispro (ADMELOG) corrective regimen sliding scale   SubCutaneous at bedtime  insulin lispro (ADMELOG) corrective regimen sliding scale   SubCutaneous three times a day before meals  metoprolol succinate ER 75 milliGRAM(s) Oral daily  sacubitril 24 mG/valsartan 26 mG 1 Tablet(s) Oral two times a day      TELEMETRY: nsr   frequent NSVT 	    ECG:  	  RADIOLOGY:   DIAGNOSTIC TESTING:  [ ] Echocardiogram:  [ ]  Catheterization:  < from: CT Head No Cont (03.03.24 @ 18:48) >    IMPRESSION:    No evidence of acute intracranial hemorrhage, midline shift or CT   evidence of acute territorial infarct.    If the patient's symptoms persist, consider short interval follow-up head   CT or brain MRI if there are no MRI contraindications.    --- End of Report ---        < end of copied text >    [ ] Stress Test:    OTHER: 	    LABS:	 	                            12.2   5.19  )-----------( 236      ( 04 Mar 2024 06:12 )             38.2     03-04    141  |  103  |  22  ----------------------------<  178<H>  3.5   |  27  |  1.07    Ca    8.5      04 Mar 2024 06:12  Phos  3.0     03-04  Mg     1.90     03-04    TPro  6.3  /  Alb  2.8<L>  /  TBili  0.8  /  DBili  x   /  AST  21  /  ALT  12  /  AlkPhos  282<H>  03-04

## 2024-03-05 LAB
ANION GAP SERPL CALC-SCNC: 10 MMOL/L — SIGNIFICANT CHANGE UP (ref 7–14)
BUN SERPL-MCNC: 21 MG/DL — SIGNIFICANT CHANGE UP (ref 7–23)
CALCIUM SERPL-MCNC: 8.1 MG/DL — LOW (ref 8.4–10.5)
CHLORIDE SERPL-SCNC: 103 MMOL/L — SIGNIFICANT CHANGE UP (ref 98–107)
CO2 SERPL-SCNC: 28 MMOL/L — SIGNIFICANT CHANGE UP (ref 22–31)
CREAT SERPL-MCNC: 1.01 MG/DL — SIGNIFICANT CHANGE UP (ref 0.5–1.3)
EGFR: 83 ML/MIN/1.73M2 — SIGNIFICANT CHANGE UP
GLUCOSE BLDC GLUCOMTR-MCNC: 122 MG/DL — HIGH (ref 70–99)
GLUCOSE BLDC GLUCOMTR-MCNC: 141 MG/DL — HIGH (ref 70–99)
GLUCOSE BLDC GLUCOMTR-MCNC: 146 MG/DL — HIGH (ref 70–99)
GLUCOSE BLDC GLUCOMTR-MCNC: 181 MG/DL — HIGH (ref 70–99)
GLUCOSE SERPL-MCNC: 162 MG/DL — HIGH (ref 70–99)
HCT VFR BLD CALC: 38 % — LOW (ref 39–50)
HGB BLD-MCNC: 12 G/DL — LOW (ref 13–17)
MAGNESIUM SERPL-MCNC: 1.8 MG/DL — SIGNIFICANT CHANGE UP (ref 1.6–2.6)
MCHC RBC-ENTMCNC: 28.4 PG — SIGNIFICANT CHANGE UP (ref 27–34)
MCHC RBC-ENTMCNC: 31.6 GM/DL — LOW (ref 32–36)
MCV RBC AUTO: 90 FL — SIGNIFICANT CHANGE UP (ref 80–100)
NRBC # BLD: 0 /100 WBCS — SIGNIFICANT CHANGE UP (ref 0–0)
NRBC # FLD: 0 K/UL — SIGNIFICANT CHANGE UP (ref 0–0)
PHOSPHATE SERPL-MCNC: 2.9 MG/DL — SIGNIFICANT CHANGE UP (ref 2.5–4.5)
PLATELET # BLD AUTO: 244 K/UL — SIGNIFICANT CHANGE UP (ref 150–400)
POTASSIUM SERPL-MCNC: 3.4 MMOL/L — LOW (ref 3.5–5.3)
POTASSIUM SERPL-SCNC: 3.4 MMOL/L — LOW (ref 3.5–5.3)
RBC # BLD: 4.22 M/UL — SIGNIFICANT CHANGE UP (ref 4.2–5.8)
RBC # FLD: 15.9 % — HIGH (ref 10.3–14.5)
SODIUM SERPL-SCNC: 141 MMOL/L — SIGNIFICANT CHANGE UP (ref 135–145)
WBC # BLD: 5.26 K/UL — SIGNIFICANT CHANGE UP (ref 3.8–10.5)
WBC # FLD AUTO: 5.26 K/UL — SIGNIFICANT CHANGE UP (ref 3.8–10.5)

## 2024-03-05 PROCEDURE — 93010 ELECTROCARDIOGRAM REPORT: CPT

## 2024-03-05 PROCEDURE — 99232 SBSQ HOSP IP/OBS MODERATE 35: CPT

## 2024-03-05 RX ORDER — POTASSIUM CHLORIDE 20 MEQ
40 PACKET (EA) ORAL ONCE
Refills: 0 | Status: COMPLETED | OUTPATIENT
Start: 2024-03-05 | End: 2024-03-05

## 2024-03-05 RX ORDER — MAGNESIUM SULFATE 500 MG/ML
1 VIAL (ML) INJECTION ONCE
Refills: 0 | Status: COMPLETED | OUTPATIENT
Start: 2024-03-05 | End: 2024-03-05

## 2024-03-05 RX ORDER — METOPROLOL TARTRATE 50 MG
50 TABLET ORAL DAILY
Refills: 0 | Status: DISCONTINUED | OUTPATIENT
Start: 2024-03-05 | End: 2024-03-06

## 2024-03-05 RX ORDER — POTASSIUM CHLORIDE 20 MEQ
10 PACKET (EA) ORAL
Refills: 0 | Status: COMPLETED | OUTPATIENT
Start: 2024-03-05 | End: 2024-03-05

## 2024-03-05 RX ORDER — MAGNESIUM SULFATE 500 MG/ML
2 VIAL (ML) INJECTION ONCE
Refills: 0 | Status: COMPLETED | OUTPATIENT
Start: 2024-03-05 | End: 2024-03-05

## 2024-03-05 RX ADMIN — Medication 25 MILLIGRAM(S): at 13:00

## 2024-03-05 RX ADMIN — Medication 100 GRAM(S): at 04:39

## 2024-03-05 RX ADMIN — SACUBITRIL AND VALSARTAN 1 TABLET(S): 24; 26 TABLET, FILM COATED ORAL at 17:17

## 2024-03-05 RX ADMIN — Medication 100 MILLIEQUIVALENT(S): at 08:47

## 2024-03-05 RX ADMIN — SACUBITRIL AND VALSARTAN 1 TABLET(S): 24; 26 TABLET, FILM COATED ORAL at 04:40

## 2024-03-05 RX ADMIN — Medication 40 MILLIEQUIVALENT(S): at 08:49

## 2024-03-05 RX ADMIN — Medication 25 MILLIGRAM(S): at 04:39

## 2024-03-05 RX ADMIN — Medication 25 MILLIGRAM(S): at 21:35

## 2024-03-05 RX ADMIN — Medication 40 MILLIGRAM(S): at 17:17

## 2024-03-05 RX ADMIN — Medication 100 MILLIGRAM(S): at 04:39

## 2024-03-05 RX ADMIN — Medication 40 MILLIGRAM(S): at 04:39

## 2024-03-05 RX ADMIN — Medication 25 GRAM(S): at 18:37

## 2024-03-05 RX ADMIN — Medication 100 MILLIEQUIVALENT(S): at 05:38

## 2024-03-05 RX ADMIN — Medication 100 MILLIEQUIVALENT(S): at 06:37

## 2024-03-05 NOTE — PROGRESS NOTE ADULT - SUBJECTIVE AND OBJECTIVE BOX
Patient laying in bed in NAD. At 2:00am he had 39 beats of NSVT at 160 bpm. Asymptomatic.   Denies chest pain, shortness of breath at rest, palpitations or lightheadedness.     Vital Signs Last 24 Hrs  T(C): 36.7 (05 Mar 2024 04:30), Max: 36.8 (04 Mar 2024 17:05)  T(F): 98 (05 Mar 2024 04:30), Max: 98.3 (04 Mar 2024 21:00)  HR: 90 (05 Mar 2024 04:30) (82 - 100)  BP: 130/79 (05 Mar 2024 04:30) (128/85 - 155/94)  BP(mean): --  RR: 18 (05 Mar 2024 04:30) (18 - 18)  SpO2: 98% (05 Mar 2024 04:30) (97% - 99%)    Parameters below as of 05 Mar 2024 04:30  Patient On (Oxygen Delivery Method): room air          EKG  Telemetry:  Normal sinus rhythm with frequent episodes of NSVT.   MEDICATIONS  (STANDING):  dextrose 5%. 1000 milliLiter(s) (100 mL/Hr) IV Continuous <Continuous>  dextrose 5%. 1000 milliLiter(s) (50 mL/Hr) IV Continuous <Continuous>  dextrose 50% Injectable 25 Gram(s) IV Push once  dextrose 50% Injectable 12.5 Gram(s) IV Push once  dextrose 50% Injectable 25 Gram(s) IV Push once  furosemide   Injectable 40 milliGRAM(s) IV Push two times a day  glucagon  Injectable 1 milliGRAM(s) IntraMuscular once  hydrALAZINE 25 milliGRAM(s) Oral three times a day  influenza   Vaccine 0.5 milliLiter(s) IntraMuscular once  insulin lispro (ADMELOG) corrective regimen sliding scale   SubCutaneous three times a day before meals  insulin lispro (ADMELOG) corrective regimen sliding scale   SubCutaneous at bedtime  magnesium sulfate  IVPB 2 Gram(s) IV Intermittent once  metoprolol succinate  milliGRAM(s) Oral daily  sacubitril 24 mG/valsartan 26 mG 1 Tablet(s) Oral two times a day    MEDICATIONS  (PRN):  acetaminophen     Tablet .. 650 milliGRAM(s) Oral every 6 hours PRN Temp greater or equal to 38C (100.4F), Mild Pain (1 - 3)  aluminum hydroxide/magnesium hydroxide/simethicone Suspension 30 milliLiter(s) Oral every 4 hours PRN Dyspepsia  dextrose Oral Gel 15 Gram(s) Oral once PRN Blood Glucose LESS THAN 70 milliGRAM(s)/deciliter  melatonin 3 milliGRAM(s) Oral at bedtime PRN Insomnia  ondansetron Injectable 4 milliGRAM(s) IV Push every 8 hours PRN Nausea and/or Vomiting          Physical exam:   Gen- well developed well nourished in NAD  Resp- decreased breath sounds throughout. No wheezing +cough  CV-  S1 and S2 RRR. No murmurs, gallops or rubs  ABD-  obese. Nontender + bowel sounds   EXT- No edema or calf tenderness  Neuro- grossly nonfocal                            12.0   5.26  )-----------( 244      ( 05 Mar 2024 02:19 )             38.0       03-05    141  |  103  |  21  ----------------------------<  162<H>  3.4<L>   |  28  |  1.01    Ca    8.1<L>      05 Mar 2024 02:19  Phos  2.9     03-05  Mg     1.80     03-05    TPro  6.3  /  Alb  2.8<L>  /  TBili  0.8  /  DBili  x   /  AST  21  /  ALT  12  /  AlkPhos  282<H>  03-04

## 2024-03-05 NOTE — PROGRESS NOTE ADULT - ASSESSMENT
ECHO 3/4/23: EF 33% mild MR, Severe global left ventricular systolic dysfunction    A/P  63 yr old male with a pmh of HTN, HF rEF ( EF33%), T2DM on metformin who presents with ROSS and bilateral lower extremity edema with his legs feeling heavy.     #Acute on chronic HFrEF  -volume overloaded  -c/w iv lasix to BID   -Not compliant with home meds (lasix 40mg daily, metoprolol XL 50mg daily, hydralazine 25mg TID)  -echo ef 27%   Left ventricular systolic function is severely decreased  -strict I/O, monitor daily weights, fluid restriction   -frequent NSVT noted   -c/w toprol -- increase to 150 mg daily    -eps eval for NSVT,possible  ICD/CRTD for cmp, HF- per EP In light of his noncompliance with optimal medical therapy, he is not a candidate for ICD   -will reassess in 3 months   -cw entresto   -suppl K     #syncope, loc  -depressed ed, ivcd, nsvt on tele, up to 31 beats  -eps eval noted   -ct head unremarkable     #Hypertensive urgency.   -bp improved   -continue hydralazine, BB, entresto, lasix      #T2DM (type 2 diabetes mellitus).   -med f/u     #HLD  -cont statin        dvt ppx   ECHO 3/4/23: EF 33% mild MR, Severe global left ventricular systolic dysfunction  cardiac cath from 3/6/23  Coronaries with minor luminal irregularities. Elevated LVEDP.     A/P  63 yr old male with a pmh of HTN, HF rEF ( EF33%), T2DM on metformin who presents with ROSS and bilateral lower extremity edema with his legs feeling heavy.     #Acute on chronic HFrEF  -volume overloaded  -c/w iv lasix 40 mg BID   -Not compliant with home meds (lasix 40mg daily, metoprolol XL 50mg daily, hydralazine 25mg TID)  -repeat echo this admission with ef 27%   Left ventricular systolic function is severely decreased  -Prior cardiac cath from 3/6/23  Coronaries with minor luminal irregularities. Elevated LVEDP.   -strict I/O, monitor daily weights, fluid restriction   -frequent NSVT noted   -c/w toprol -- increase to 150 mg daily    -eps eval for NSVT, possible  ICD/CRTD for cmp, HF- per EP In light of his noncompliance with optimal medical therapy, he is not a candidate for ICD   -will reassess in 3 months   -cw entresto   -suppl K     #syncope, loc  -depressed ed, ivcd, nsvt on tele, up to 31 beats  -eps eval noted   -ct head unremarkable     #Hypertensive urgency.   -bp improved   -continue hydralazine, BB, entresto, lasix      #T2DM (type 2 diabetes mellitus).   -med f/u     #HLD  -cont statin        dvt ppx

## 2024-03-05 NOTE — PROGRESS NOTE ADULT - SUBJECTIVE AND OBJECTIVE BOX
CARDIOLOGY FOLLOW UP - Dr. Zavala  DATE OF SERVICE: 3/5/24    CC frequent NSVT on tele   no cp or sob         REVIEW OF SYSTEMS:  CONSTITUTIONAL: No fever, weight loss, or fatigue  RESPIRATORY: No cough, wheezing, chills or hemoptysis; No Shortness of Breath  CARDIOVASCULAR: No chest pain, palpitations, passing out, dizziness, or leg swelling  GASTROINTESTINAL: No abdominal or epigastric pain. No nausea, vomiting, or hematemesis; No diarrhea or constipation. No melena or hematochezia.  VASCULAR: No edema     PHYSICAL EXAM:  T(C): 36.7 (03-05-24 @ 04:30), Max: 36.8 (03-04-24 @ 17:05)  HR: 90 (03-05-24 @ 04:30) (82 - 100)  BP: 130/79 (03-05-24 @ 04:30) (128/85 - 155/94)  RR: 18 (03-05-24 @ 04:30) (18 - 18)  SpO2: 98% (03-05-24 @ 04:30) (97% - 99%)  Wt(kg): --  I&O's Summary    04 Mar 2024 07:01  -  05 Mar 2024 07:00  --------------------------------------------------------  IN: 0 mL / OUT: 1200 mL / NET: -1200 mL        Appearance: Normal	  Cardiovascular: Normal S1 S2,RRR, No JVD, No murmurs  Respiratory: Lungs clear to auscultation	  Gastrointestinal:  Soft, Non-tender, + BS	  Extremities: edema       Home Medications:  METFORMIN HCL 1,000 MG TABLET: TAKE 1 TABLET BY MOUTH TWICE A DAY WITH MEALS (02 Mar 2024 20:59)  METOPROLOL SUCC ER 50 MG TAB: TAKE 1 TABLET BY MOUTH EVERY DAY (02 Mar 2024 20:59)      MEDICATIONS  (STANDING):  dextrose 5%. 1000 milliLiter(s) (100 mL/Hr) IV Continuous <Continuous>  dextrose 5%. 1000 milliLiter(s) (50 mL/Hr) IV Continuous <Continuous>  dextrose 50% Injectable 25 Gram(s) IV Push once  dextrose 50% Injectable 25 Gram(s) IV Push once  dextrose 50% Injectable 12.5 Gram(s) IV Push once  furosemide   Injectable 40 milliGRAM(s) IV Push two times a day  glucagon  Injectable 1 milliGRAM(s) IntraMuscular once  hydrALAZINE 25 milliGRAM(s) Oral three times a day  influenza   Vaccine 0.5 milliLiter(s) IntraMuscular once  insulin lispro (ADMELOG) corrective regimen sliding scale   SubCutaneous at bedtime  insulin lispro (ADMELOG) corrective regimen sliding scale   SubCutaneous three times a day before meals  magnesium sulfate  IVPB 2 Gram(s) IV Intermittent once  metoprolol succinate  milliGRAM(s) Oral daily  sacubitril 24 mG/valsartan 26 mG 1 Tablet(s) Oral two times a day      TELEMETRY: 	    ECG:  	  RADIOLOGY:   DIAGNOSTIC TESTING:  [ ] Echocardiogram:  [ ]  Catheterization:  [ ] Stress Test:    OTHER: 	    LABS:	 	                            12.0   5.26  )-----------( 244      ( 05 Mar 2024 02:19 )             38.0     03-05    141  |  103  |  21  ----------------------------<  162<H>  3.4<L>   |  28  |  1.01    Ca    8.1<L>      05 Mar 2024 02:19  Phos  2.9     03-05  Mg     1.80     03-05    TPro  6.3  /  Alb  2.8<L>  /  TBili  0.8  /  DBili  x   /  AST  21  /  ALT  12  /  AlkPhos  282<H>  03-04

## 2024-03-05 NOTE — PROGRESS NOTE ADULT - SUBJECTIVE AND OBJECTIVE BOX
Date of service: 03-05-24 @ 12:20      Patient is a 63y old  Male who presents with a chief complaint of Acute decompensated heart failure (05 Mar 2024 11:33)                                                               INTERVAL HPI/OVERNIGHT EVENTS:    REVIEW OF SYSTEMS:     CONSTITUTIONAL: No weakness, fevers or chills  RESPIRATORY: No cough, wheezing,  No shortness of breath  CARDIOVASCULAR: No chest pain or palpitations  GASTROINTESTINAL: No abdominal pain  . No nausea, vomiting, or hematemesis; No diarrhea or constipation. No melena or hematochezia.  GENITOURINARY: No dysuria, frequency or hematuria  NEUROLOGICAL: No numbness or weakness                                                                                                                                                                                                                                                                        Medications:  MEDICATIONS  (STANDING):  dextrose 5%. 1000 milliLiter(s) (50 mL/Hr) IV Continuous <Continuous>  dextrose 5%. 1000 milliLiter(s) (100 mL/Hr) IV Continuous <Continuous>  dextrose 50% Injectable 25 Gram(s) IV Push once  dextrose 50% Injectable 12.5 Gram(s) IV Push once  dextrose 50% Injectable 25 Gram(s) IV Push once  furosemide   Injectable 40 milliGRAM(s) IV Push two times a day  glucagon  Injectable 1 milliGRAM(s) IntraMuscular once  hydrALAZINE 25 milliGRAM(s) Oral three times a day  influenza   Vaccine 0.5 milliLiter(s) IntraMuscular once  insulin lispro (ADMELOG) corrective regimen sliding scale   SubCutaneous at bedtime  insulin lispro (ADMELOG) corrective regimen sliding scale   SubCutaneous three times a day before meals  magnesium sulfate  IVPB 2 Gram(s) IV Intermittent once  metoprolol succinate  milliGRAM(s) Oral daily  sacubitril 24 mG/valsartan 26 mG 1 Tablet(s) Oral two times a day    MEDICATIONS  (PRN):  acetaminophen     Tablet .. 650 milliGRAM(s) Oral every 6 hours PRN Temp greater or equal to 38C (100.4F), Mild Pain (1 - 3)  aluminum hydroxide/magnesium hydroxide/simethicone Suspension 30 milliLiter(s) Oral every 4 hours PRN Dyspepsia  dextrose Oral Gel 15 Gram(s) Oral once PRN Blood Glucose LESS THAN 70 milliGRAM(s)/deciliter  melatonin 3 milliGRAM(s) Oral at bedtime PRN Insomnia  ondansetron Injectable 4 milliGRAM(s) IV Push every 8 hours PRN Nausea and/or Vomiting       Allergies    No Known Allergies    Intolerances      Vital Signs Last 24 Hrs  T(C): 36.7 (05 Mar 2024 04:30), Max: 36.8 (04 Mar 2024 17:05)  T(F): 98 (05 Mar 2024 04:30), Max: 98.3 (04 Mar 2024 21:00)  HR: 90 (05 Mar 2024 04:30) (82 - 100)  BP: 130/79 (05 Mar 2024 04:30) (128/85 - 155/94)  BP(mean): --  RR: 18 (05 Mar 2024 04:30) (18 - 18)  SpO2: 98% (05 Mar 2024 04:30) (97% - 99%)    Parameters below as of 05 Mar 2024 04:30  Patient On (Oxygen Delivery Method): room air      CAPILLARY BLOOD GLUCOSE      POCT Blood Glucose.: 122 mg/dL (05 Mar 2024 08:33)  POCT Blood Glucose.: 157 mg/dL (04 Mar 2024 21:45)  POCT Blood Glucose.: 155 mg/dL (04 Mar 2024 17:30)      03-04 @ 07:01  -  03-05 @ 07:00  --------------------------------------------------------  IN: 0 mL / OUT: 1200 mL / NET: -1200 mL      Physical Exam:    Daily     Daily   General:  Well appearing, NAD, not cachetic  HEENT:  Nonicteric, PERRLA  CV:  RRR, S1S2   Lungs:  decreased at bases   Abdomen:  Soft, non-tender, no distended, positive BS  Extremities:  BLE edema   Neuro:  AAOx3, non-focal, grossly intact                                                                                                                                                                                                                                                                                                LABS:                               12.0   5.26  )-----------( 244      ( 05 Mar 2024 02:19 )             38.0                      03-05    141  |  103  |  21  ----------------------------<  162<H>  3.4<L>   |  28  |  1.01    Ca    8.1<L>      05 Mar 2024 02:19  Phos  2.9     03-05  Mg     1.80     03-05    TPro  6.3  /  Alb  2.8<L>  /  TBili  0.8  /  DBili  x   /  AST  21  /  ALT  12  /  AlkPhos  282<H>  03-04                       RADIOLOGY & ADDITIONAL TESTS         I personally reviewed: [  ]EKG   [  ]CXR    [  ] CT      A/P:         Discussed with :     Antonio consultants' Notes   Time spent :

## 2024-03-05 NOTE — PROGRESS NOTE ADULT - ASSESSMENT
63 yr old male with a pmh of HTN, nonischemic cardiomyopathy ( EF33% this admission echo), HFrEF, T2DM on metformin who presented with acute on chronic ADHF. This has been progressing since Thanksgiving 2023. Pt endorses 2 falls due to lower extremity weakness but denies syncope. Patient states he was admitted to [facility] last year for similar symptoms. He has been noncompliant with medications for unclear reasons. ICD was never discussed. During this admission he has been in sinus rhythm with frequent episodes of NSVT @160 bpm, longest run 39 beats overnight.  Asymptomatic. Patient being aggressively diuresed. Beta blockers increased for ectopy suppression.    Echo LVEF 33% with global LV dysfunction. Mild MR.   Cardiac cath 3/2023: coronaries with minor luminal irregularities   EP consulted for possible ICD.   Plan:   In light of his noncompliance with optimal medical therapy, he is not a candidate for ICD for primary prevention at this time despite telemetry evidence of NSVT and LVEF 33%. . Our recommendation would be to continue aggressive diuresis while continuing GDMT for a duration of 3 months then reevaluate his LVEF. His ectopy burden will likely decrease when patient no longer in decompensated heart failure.   Keep K+ > 4.0.   K+ 3.4 today.              63 yr old male with a pmh of HTN, nonischemic cardiomyopathy ( EF33% this admission echo), HFrEF, T2DM on metformin who presented with acute on chronic ADHF. This has been progressing since Thanksgiving 2023. Pt endorses 2 falls due to lower extremity weakness but denies syncope. Patient states he was admitted to [facility] last year for similar symptoms. He has been noncompliant with medications for unclear reasons. ICD was never discussed. During this admission he has been in sinus rhythm with frequent episodes of NSVT @160 bpm, longest run 39 beats overnight.  Asymptomatic. Patient being aggressively diuresed. Beta blockers increased for ectopy suppression.    Echo LVEF 33% with global LV dysfunction. Mild MR.   Cardiac cath 3/2023: coronaries with minor luminal irregularities   EP consulted for possible ICD.   Plan:   In light of his noncompliance with optimal medical therapy, he is not a candidate for ICD for primary prevention at this time despite telemetry evidence of NSVT and LVEF 33%. . Our recommendation would be to continue aggressive diuresis while continuing GDMT for a duration of 3 months then reevaluate his LVEF. His ectopy burden will likely decrease when patient no longer in decompensated heart failure.   Keep K+ > 4.0.   K+ 3.4 today.  Please replete.  Increase beta blocker for NSVT.             63 yr old male with a pmh of HTN, nonischemic cardiomyopathy ( EF33% this admission echo), HFrEF, T2DM on metformin who presented with acute on chronic ADHF. This has been progressing since Thanksgiving 2023. Pt endorses 2 falls due to lower extremity weakness but denies syncope. Patient states he was admitted to [facility] last year for similar symptoms. He has been noncompliant with medications for unclear reasons. ICD was never discussed. During this admission he has been in sinus rhythm with frequent episodes of NSVT @160 bpm, longest run 39 beats overnight.  Asymptomatic. Patient being aggressively diuresed. Beta blockers increased for ectopy suppression.    Echo LVEF 33% with global LV dysfunction. Mild MR.   Cardiac cath 3/2023: coronaries with minor luminal irregularities   EP consulted for possible ICD.   Plan:   NSVT occurred overnight while asleep suggesting possible RADHA as a contributing factor. Can check nocturnal pulse oximetry.   In light of his noncompliance with optimal medical therapy, he is not a candidate for ICD for primary prevention at this time despite telemetry evidence of NSVT and LVEF 33%. . Our recommendation would be to continue aggressive diuresis while continuing GDMT for a duration of 3 months then reevaluate his LVEF. His ectopy burden will likely decrease when patient no longer in decompensated heart failure.   Keep K+ > 4.0.   K+ 3.4 today.  Please replete.  Increase beta blocker for NSVT.

## 2024-03-05 NOTE — CHART NOTE - NSCHARTNOTEFT_GEN_A_CORE
MEDICINE ACP NIGHT COVERAGE    Notified by RN that patient with run of NSVT on telemetry. EKG and BMP ordered. Patient seen at bedside appearing in no acute distress resting comfortably. He denies any acute symptoms including dizziness, chest pain, palpitations and SOB. Tele reviewed, appears to have had 39 beats of NSVT. EKG reviewed, converted to NSR. Above communicated with overnight EP NP. Will follow up lytes. Pt due for metoprolol at 6AM which was increased yesterday. Will continue to monitor.     Martin Roland PA-C  Medicine ACP  [id number]

## 2024-03-05 NOTE — PROGRESS NOTE ADULT - ASSESSMENT
A/P  63 yr old male with a pmh of HTN, HF rEF ( EF33%), T2DM on metformin who presents with ROSS and bilateral lower extremity edema with his legs feeling heavy.     #Acute on chronic systolic heart Failure : due to non complaince with meds   c/w diuresis   -started on entresto   c/w coreg   Cardio following   EF <35%      #syncope  #NSVT on tele   -EP cardio consulted : apprecaite input : In light of his noncompliance with optimal medical therapy, he is not a candidate for ICD for primary prevention at this time despite telemetry evidence of NSVT and LVEF 33%. . Our recommendation would be to continue aggressive diuresis while continuing GDMT for a duration of 3 months then reevaluate his LVEF. His ectopy burden will likely decrease when patient no longer in decompensated heart failure.   Keep K+ > 4.0.   K+ 3.4 today.  Please replete.  Increase beta blocker for NSVT.      tele monitoring    #Hypertensive   -BP improved   -continue hydralazine 25mg TID, metoprolol XL 50mg daily and lasix   - entresto added    #DM-2   a1c 7.1  c/w Insulin / FSBS    #HLD  -cont statin    discussed ACP with pt : FULL CODE     d/w ACP

## 2024-03-06 LAB
ALBUMIN SERPL ELPH-MCNC: 2.8 G/DL — LOW (ref 3.3–5)
ALP SERPL-CCNC: 278 U/L — HIGH (ref 40–120)
ALT FLD-CCNC: 13 U/L — SIGNIFICANT CHANGE UP (ref 4–41)
ANION GAP SERPL CALC-SCNC: 9 MMOL/L — SIGNIFICANT CHANGE UP (ref 7–14)
AST SERPL-CCNC: 22 U/L — SIGNIFICANT CHANGE UP (ref 4–40)
BILIRUB SERPL-MCNC: 0.9 MG/DL — SIGNIFICANT CHANGE UP (ref 0.2–1.2)
BUN SERPL-MCNC: 21 MG/DL — SIGNIFICANT CHANGE UP (ref 7–23)
CALCIUM SERPL-MCNC: 8.4 MG/DL — SIGNIFICANT CHANGE UP (ref 8.4–10.5)
CHLORIDE SERPL-SCNC: 103 MMOL/L — SIGNIFICANT CHANGE UP (ref 98–107)
CO2 SERPL-SCNC: 28 MMOL/L — SIGNIFICANT CHANGE UP (ref 22–31)
CREAT SERPL-MCNC: 0.98 MG/DL — SIGNIFICANT CHANGE UP (ref 0.5–1.3)
EGFR: 86 ML/MIN/1.73M2 — SIGNIFICANT CHANGE UP
GLUCOSE BLDC GLUCOMTR-MCNC: 127 MG/DL — HIGH (ref 70–99)
GLUCOSE BLDC GLUCOMTR-MCNC: 145 MG/DL — HIGH (ref 70–99)
GLUCOSE BLDC GLUCOMTR-MCNC: 177 MG/DL — HIGH (ref 70–99)
GLUCOSE BLDC GLUCOMTR-MCNC: 179 MG/DL — HIGH (ref 70–99)
GLUCOSE SERPL-MCNC: 151 MG/DL — HIGH (ref 70–99)
HCT VFR BLD CALC: 39.1 % — SIGNIFICANT CHANGE UP (ref 39–50)
HGB BLD-MCNC: 12.5 G/DL — LOW (ref 13–17)
MAGNESIUM SERPL-MCNC: 2 MG/DL — SIGNIFICANT CHANGE UP (ref 1.6–2.6)
MCHC RBC-ENTMCNC: 28.7 PG — SIGNIFICANT CHANGE UP (ref 27–34)
MCHC RBC-ENTMCNC: 32 GM/DL — SIGNIFICANT CHANGE UP (ref 32–36)
MCV RBC AUTO: 89.9 FL — SIGNIFICANT CHANGE UP (ref 80–100)
NRBC # BLD: 0 /100 WBCS — SIGNIFICANT CHANGE UP (ref 0–0)
NRBC # FLD: 0 K/UL — SIGNIFICANT CHANGE UP (ref 0–0)
NT-PROBNP SERPL-SCNC: 7415 PG/ML — HIGH
PHOSPHATE SERPL-MCNC: 2.6 MG/DL — SIGNIFICANT CHANGE UP (ref 2.5–4.5)
PLATELET # BLD AUTO: 238 K/UL — SIGNIFICANT CHANGE UP (ref 150–400)
POTASSIUM SERPL-MCNC: 3.6 MMOL/L — SIGNIFICANT CHANGE UP (ref 3.5–5.3)
POTASSIUM SERPL-SCNC: 3.6 MMOL/L — SIGNIFICANT CHANGE UP (ref 3.5–5.3)
PROT SERPL-MCNC: 6.7 G/DL — SIGNIFICANT CHANGE UP (ref 6–8.3)
RBC # BLD: 4.35 M/UL — SIGNIFICANT CHANGE UP (ref 4.2–5.8)
RBC # FLD: 15.9 % — HIGH (ref 10.3–14.5)
SODIUM SERPL-SCNC: 140 MMOL/L — SIGNIFICANT CHANGE UP (ref 135–145)
WBC # BLD: 4.89 K/UL — SIGNIFICANT CHANGE UP (ref 3.8–10.5)
WBC # FLD AUTO: 4.89 K/UL — SIGNIFICANT CHANGE UP (ref 3.8–10.5)

## 2024-03-06 PROCEDURE — 99232 SBSQ HOSP IP/OBS MODERATE 35: CPT

## 2024-03-06 RX ORDER — SACUBITRIL AND VALSARTAN 24; 26 MG/1; MG/1
1 TABLET, FILM COATED ORAL
Qty: 60 | Refills: 0
Start: 2024-03-06 | End: 2024-04-04

## 2024-03-06 RX ORDER — LOSARTAN POTASSIUM 100 MG/1
25 TABLET, FILM COATED ORAL DAILY
Refills: 0 | Status: DISCONTINUED | OUTPATIENT
Start: 2024-03-07 | End: 2024-03-28

## 2024-03-06 RX ORDER — FUROSEMIDE 40 MG
60 TABLET ORAL
Refills: 0 | Status: DISCONTINUED | OUTPATIENT
Start: 2024-03-06 | End: 2024-03-08

## 2024-03-06 RX ORDER — METOPROLOL TARTRATE 50 MG
200 TABLET ORAL DAILY
Refills: 0 | Status: DISCONTINUED | OUTPATIENT
Start: 2024-03-07 | End: 2024-03-10

## 2024-03-06 RX ADMIN — Medication 100 MILLIGRAM(S): at 05:31

## 2024-03-06 RX ADMIN — Medication 25 MILLIGRAM(S): at 05:31

## 2024-03-06 RX ADMIN — Medication 25 MILLIGRAM(S): at 22:23

## 2024-03-06 RX ADMIN — Medication 50 MILLIGRAM(S): at 05:31

## 2024-03-06 RX ADMIN — Medication 25 MILLIGRAM(S): at 11:45

## 2024-03-06 RX ADMIN — SACUBITRIL AND VALSARTAN 1 TABLET(S): 24; 26 TABLET, FILM COATED ORAL at 05:31

## 2024-03-06 RX ADMIN — Medication 1: at 18:05

## 2024-03-06 RX ADMIN — Medication 40 MILLIGRAM(S): at 05:32

## 2024-03-06 RX ADMIN — Medication 60 MILLIGRAM(S): at 18:14

## 2024-03-06 RX ADMIN — SACUBITRIL AND VALSARTAN 1 TABLET(S): 24; 26 TABLET, FILM COATED ORAL at 18:11

## 2024-03-06 NOTE — PROGRESS NOTE ADULT - SUBJECTIVE AND OBJECTIVE BOX
CARDIOLOGY FOLLOW UP - Dr. Zavala  DATE OF SERVICE: 3/6/24    CC mild improvement in SOB   + edema       REVIEW OF SYSTEMS:  CONSTITUTIONAL: No fever, weight loss, or fatigue  RESPIRATORY: No cough, wheezing, chills or hemoptysis;   CARDIOVASCULAR: No chest pain, palpitations, passing out, dizziness, or leg swelling  GASTROINTESTINAL: No abdominal or epigastric pain. No nausea, vomiting, or hematemesis; No diarrhea or constipation. No melena or hematochezia.      PHYSICAL EXAM:  T(C): 36.8 (03-06-24 @ 05:00), Max: 36.8 (03-05-24 @ 20:43)  HR: 94 (03-06-24 @ 05:00) (77 - 94)  BP: 125/88 (03-06-24 @ 05:00) (115/78 - 138/57)  RR: 18 (03-06-24 @ 05:00) (18 - 18)  SpO2: 97% (03-06-24 @ 05:00) (95% - 98%)  Wt(kg): --  I&O's Summary    05 Mar 2024 07:01  -  06 Mar 2024 07:00  --------------------------------------------------------  IN: 350 mL / OUT: 1000 mL / NET: -650 mL    06 Mar 2024 07:01  -  06 Mar 2024 10:17  --------------------------------------------------------  IN: 0 mL / OUT: 600 mL / NET: -600 mL        Appearance: Normal	  Cardiovascular: Normal S1 S2,RRR  Respiratory: diminished   Gastrointestinal:  distended , Non-tender, + BS	  Extremities: edema       Home Medications:  METFORMIN HCL 1,000 MG TABLET: TAKE 1 TABLET BY MOUTH TWICE A DAY WITH MEALS (02 Mar 2024 20:59)  METOPROLOL SUCC ER 50 MG TAB: TAKE 1 TABLET BY MOUTH EVERY DAY (02 Mar 2024 20:59)      MEDICATIONS  (STANDING):  dextrose 5%. 1000 milliLiter(s) (50 mL/Hr) IV Continuous <Continuous>  dextrose 5%. 1000 milliLiter(s) (100 mL/Hr) IV Continuous <Continuous>  dextrose 50% Injectable 25 Gram(s) IV Push once  dextrose 50% Injectable 12.5 Gram(s) IV Push once  dextrose 50% Injectable 25 Gram(s) IV Push once  furosemide   Injectable 40 milliGRAM(s) IV Push two times a day  glucagon  Injectable 1 milliGRAM(s) IntraMuscular once  hydrALAZINE 25 milliGRAM(s) Oral three times a day  influenza   Vaccine 0.5 milliLiter(s) IntraMuscular once  insulin lispro (ADMELOG) corrective regimen sliding scale   SubCutaneous at bedtime  insulin lispro (ADMELOG) corrective regimen sliding scale   SubCutaneous three times a day before meals  metoprolol succinate  milliGRAM(s) Oral daily  metoprolol succinate ER 50 milliGRAM(s) Oral daily  sacubitril 24 mG/valsartan 26 mG 1 Tablet(s) Oral two times a day      TELEMETRY: nsr / sinus tach  NSVT noted 	    ECG:  	  RADIOLOGY:   DIAGNOSTIC TESTING:  [ ] Echocardiogram:  [ ]  Catheterization:  [ ] Stress Test:    OTHER: 	    LABS:	 	                            12.5   4.89  )-----------( 238      ( 06 Mar 2024 07:33 )             39.1     03-06    140  |  103  |  21  ----------------------------<  151<H>  3.6   |  28  |  0.98    Ca    8.4      06 Mar 2024 07:33  Phos  2.6     03-06  Mg     2.00     03-06    TPro  6.7  /  Alb  2.8<L>  /  TBili  0.9  /  DBili  x   /  AST  22  /  ALT  13  /  AlkPhos  278<H>  03-06

## 2024-03-06 NOTE — PROGRESS NOTE ADULT - SUBJECTIVE AND OBJECTIVE BOX
Date of service: 24 @ 10:43      Patient is a 63y old  Male who presents with a chief complaint of Acute decompensated heart failure (06 Mar 2024 10:17)                                                               INTERVAL HPI/OVERNIGHT EVENTS:    REVIEW OF SYSTEMS:     CONSTITUTIONAL: No weakness, fevers or chills  EYES/ENT: No visual changes , no ear ache   NECK: No pain or stiffness  RESPIRATORY: improvoing  shortness of breath  CARDIOVASCULAR: No chest pain or palpitations  GASTROINTESTINAL: No abdominal pain  . No nausea, vomiting, or hematemesis; No diarrhea or constipation. No melena or hematochezia.  GENITOURINARY: No dysuria, frequency or hematuria  NEUROLOGICAL: No numbness or weakness  SKIN: No itching, burning, rashes, or lesions                                                                                                                                                                                                                                                                                 Medications:  MEDICATIONS  (STANDING):  dextrose 5%. 1000 milliLiter(s) (50 mL/Hr) IV Continuous <Continuous>  dextrose 5%. 1000 milliLiter(s) (100 mL/Hr) IV Continuous <Continuous>  dextrose 50% Injectable 25 Gram(s) IV Push once  dextrose 50% Injectable 25 Gram(s) IV Push once  dextrose 50% Injectable 12.5 Gram(s) IV Push once  furosemide   Injectable 60 milliGRAM(s) IV Push two times a day  glucagon  Injectable 1 milliGRAM(s) IntraMuscular once  hydrALAZINE 25 milliGRAM(s) Oral three times a day  influenza   Vaccine 0.5 milliLiter(s) IntraMuscular once  insulin lispro (ADMELOG) corrective regimen sliding scale   SubCutaneous three times a day before meals  insulin lispro (ADMELOG) corrective regimen sliding scale   SubCutaneous at bedtime  sacubitril 24 mG/valsartan 26 mG 1 Tablet(s) Oral two times a day    MEDICATIONS  (PRN):  acetaminophen     Tablet .. 650 milliGRAM(s) Oral every 6 hours PRN Temp greater or equal to 38C (100.4F), Mild Pain (1 - 3)  aluminum hydroxide/magnesium hydroxide/simethicone Suspension 30 milliLiter(s) Oral every 4 hours PRN Dyspepsia  dextrose Oral Gel 15 Gram(s) Oral once PRN Blood Glucose LESS THAN 70 milliGRAM(s)/deciliter  melatonin 3 milliGRAM(s) Oral at bedtime PRN Insomnia  ondansetron Injectable 4 milliGRAM(s) IV Push every 8 hours PRN Nausea and/or Vomiting       Allergies    No Known Allergies    Intolerances      Vital Signs Last 24 Hrs  T(C): 36.8 (06 Mar 2024 05:00), Max: 36.8 (05 Mar 2024 20:43)  T(F): 98.2 (06 Mar 2024 05:00), Max: 98.3 (05 Mar 2024 20:43)  HR: 94 (06 Mar 2024 05:00) (77 - 94)  BP: 125/88 (06 Mar 2024 05:00) (115/78 - 138/57)  BP(mean): --  RR: 18 (06 Mar 2024 05:00) (18 - 18)  SpO2: 97% (06 Mar 2024 05:00) (95% - 98%)    Parameters below as of 06 Mar 2024 05:00  Patient On (Oxygen Delivery Method): room air      CAPILLARY BLOOD GLUCOSE      POCT Blood Glucose.: 127 mg/dL (06 Mar 2024 08:51)  POCT Blood Glucose.: 181 mg/dL (05 Mar 2024 22:43)  POCT Blood Glucose.: 141 mg/dL (05 Mar 2024 18:11)  POCT Blood Glucose.: 146 mg/dL (05 Mar 2024 12:40)       @ 07:  -   @ 07:00  --------------------------------------------------------  IN: 350 mL / OUT: 1000 mL / NET: -650 mL     @ 07:  -   @ 10:43  --------------------------------------------------------  IN: 0 mL / OUT: 600 mL / NET: -600 mL      Physical Exam:    Daily     Daily Weight in k.2 (06 Mar 2024 05:00)  General:  Well appearing, NAD, not cachetic  HEENT:  Nonicteric, PERRLA  CV:  RRR, S1S2   Lungs:  decreased BS   Abdomen:  Soft, non-tender, no distended, positive BS  Extremities: edema   Neuro:  AAOx3, non-focal, grossly intact                                                                                                                                                                                                                                                                                                LABS:                               12.5   4.89  )-----------( 238      ( 06 Mar 2024 07:33 )             39.1                      03-06    140  |  103  |  21  ----------------------------<  151<H>  3.6   |  28  |  0.98    Ca    8.4      06 Mar 2024 07:33  Phos  2.6     -06  Mg     2.00     -06    TPro  6.7  /  Alb  2.8<L>  /  TBili  0.9  /  DBili  x   /  AST  22  /  ALT  13  /  AlkPhos  278<H>  -                       RADIOLOGY & ADDITIONAL TESTS         I personally reviewed: [  ]EKG   [  ]CXR    [  ] CT      A/P:         Discussed with :     Antonio consultants' Notes   Time spent :

## 2024-03-06 NOTE — PROGRESS NOTE ADULT - SUBJECTIVE AND OBJECTIVE BOX
Interval History:  Patient resting comfortably in bed   Denies CP/SOB/palpitations/dizziness  No acute events overnight      Medications:  acetaminophen     Tablet .. 650 milliGRAM(s) Oral every 6 hours PRN  aluminum hydroxide/magnesium hydroxide/simethicone Suspension 30 milliLiter(s) Oral every 4 hours PRN  dextrose 5%. 1000 milliLiter(s) IV Continuous <Continuous>  dextrose 5%. 1000 milliLiter(s) IV Continuous <Continuous>  dextrose 50% Injectable 25 Gram(s) IV Push once  dextrose 50% Injectable 25 Gram(s) IV Push once  dextrose 50% Injectable 12.5 Gram(s) IV Push once  dextrose Oral Gel 15 Gram(s) Oral once PRN  furosemide   Injectable 40 milliGRAM(s) IV Push two times a day  glucagon  Injectable 1 milliGRAM(s) IntraMuscular once  hydrALAZINE 25 milliGRAM(s) Oral three times a day  influenza   Vaccine 0.5 milliLiter(s) IntraMuscular once  insulin lispro (ADMELOG) corrective regimen sliding scale   SubCutaneous three times a day before meals  insulin lispro (ADMELOG) corrective regimen sliding scale   SubCutaneous at bedtime  melatonin 3 milliGRAM(s) Oral at bedtime PRN  metoprolol succinate  milliGRAM(s) Oral daily  metoprolol succinate ER 50 milliGRAM(s) Oral daily  ondansetron Injectable 4 milliGRAM(s) IV Push every 8 hours PRN  sacubitril 24 mG/valsartan 26 mG 1 Tablet(s) Oral two times a day      Vitals:  T(C): 36.8 (24 @ 05:00), Max: 36.8 (24 @ 20:43)  HR: 94 (24 @ 05:00) (77 - 94)  BP: 125/88 (24 @ 05:00) (115/78 - 138/57)  BP(mean): --  RR: 18 (24 @ 05:00) (18 - 18)  SpO2: 97% (24 @ 05:00) (95% - 98%)    Daily     Daily Weight in k.2 (06 Mar 2024 05:00)        I&O's Summary    05 Mar 2024 07:01  -  06 Mar 2024 07:00  --------------------------------------------------------  IN: 350 mL / OUT: 1000 mL / NET: -650 mL    06 Mar 2024 07:01  -  06 Mar 2024 09:21  --------------------------------------------------------  IN: 0 mL / OUT: 600 mL / NET: -600 mL        Physical Exam:  Appearance: No Acute Distress  Cardiovascular: Normal S1 S2  Respiratory: Clear to auscultation bilaterally  Gastrointestinal: Soft, Non-tender	  Skin: No cyanosis	  Neurologic: Non-focal  Extremities: BLE edema  Psychiatry: A & O x 3    Labs:                        12.5   4.89  )-----------( 238      ( 06 Mar 2024 07:33 )             39.1     03-06    140  |  103  |  21  ----------------------------<  151<H>  3.6   |  28  |  0.98    Ca    8.4      06 Mar 2024 07:33  Phos  2.6     03-06  Mg     2.00     -    TPro  6.7  /  Alb  2.8<L>  /  TBili  0.9  /  DBili  x   /  AST  22  /  ALT  13  /  AlkPhos  278<H>  -06      TELEMETRY:    Echocardiogram:  TTE Limited W or WO Ultrasound Enhancing Agent (24 @ 12:18)     _______________________________________________________________________________________     CONCLUSIONS:      1. Left ventricular cavity is mildly dilated. Left ventricular systolic function is severely decreased with an ejection fraction of 27 % by Seuro's method of disks.   2. Enlarged right ventricular cavity size and reduced systolic function. Tricuspid annular plane systolic excursion (TAPSE) is 0.6 cm (normal >=1.7 cm).   3. The left atrium is moderately dilated.   4. Structurally normal mitral valve with normal leaflet excursion.   5. Symmetric mitral valve leaflet tethering.   6. Mild mitral regurgitation.   7. Estimated pulmonary artery systolic pressure is 47 mmHg.   8. The inferior venacava is dilated (dilated >2.1cm) with abnormal inspiratory collapse (abnormal <50%) consistent with elevated right atrial pressure (~15, range 10-20mmHg).   9. Bilateral pleural effusion noted.    ________________________________________________________________________________________  FINDINGS:     Left Ventricle:  The left ventricular cavity is mildly dilated. Left ventricular systolic function is severely decreased with a calculated ejection fraction of 27 % by the Suero's biplane method of disks. There is normal left ventricular diastolic function. There is increased LV mass and eccentric hypertrophy.     Right Ventricle:  The right ventricular cavity is enlarged in size and reduced systolic function. Tricuspid annular plane systolic excursion (TAPSE) is 0.6 cm (normal >=1.7 cm).     Left Atrium:  The left atrium is moderately dilated with an indexed volume of 43.24 ml/m².     Right Atrium:  The right atrium is normal in size.     Aortic Valve:  The aortic valve appears trileaflet with normal systolic excursion.     Mitral Valve:  Structurally normal mitral valve with normal leaflet excursion. There is symmetric leaflet tethering. There is mild mitral regurgitation.     Tricuspid Valve:  Structurally normal tricuspid valve with normal leaflet excursion. There is moderate tricuspid regurgitation. Estimated pulmonary artery systolic pressure is 47 mmHg.     Pulmonic Valve:  Structurally normal pulmonic valve with normal leaflet excursion. There is trace pulmonic regurgitation.     Aorta:  The aortic root at the sinuses of Valsalva is normal in size. The ascending aorta diameter is normal in size.     Pleura:  Bilateral pleural effusion noted.     Systemic Veins:  The inferior vena cava is dilated (dilated >2.1cm) with abnormal inspiratory collapse (abnormal <50%) consistent with elevated right atrial pressure (~15, range 10-20mmHg).  ____________________________________________________________________  QUANTITATIVE DATA:  Left Ventricle Measurements: (Indexed to BSA)    IVSd (2D):   1.4 cm  LVPWd (2D):  1.3 cm  LVIDd (2D):  5.9 cm  LVIDs (2D):  5.3 cm  LV Mass:     376 g  166.4 g/m²  LV Vol d, MOD A2C: 164.0 ml 72.59 ml/m²  LV Vol d, MOD A4C: 176.0 ml 77.90 ml/m²  LV Vol d, MOD BP:  175.4 ml 77.64 ml/m²  LV Vol s,MOD A2C: 120.0 ml 53.11 ml/m²  LV Vol s, MOD A4C: 133.0 ml 58.87 ml/m²  LV Vol s, MOD BP:  127.3 ml 56.35 ml/m²  LVOT SV MOD BP:    48.1 ml  LV EF% MOD BP:     27 %     MV E Vmax:    0.74 m/s  MV A Vmax:    1.00 m/s  MV E/A:       0.75  e' lateral:11.90 cm/s  e' medial:    6.08 cm/s  E/e' lateral: 6.26  E/e' medial:  12.25  E/e' Average: 9.14  MV DT:        86 msec       Left Atrium Measurements: (Indexed to BSA)  LA Diam 2D: 4.20 cm    Right Ventricle Measurements:     TAPSE:            0.6 cm  TV Jimena. S':       6.81 cm/s  RV Base (RVID1):  4.7 cm  RV Mid (RVID2):   2.7 cm  RV Major (RVID3): 8.6 cm       LVOT / RVOT/ Qp/Qs Data: (Indexed to BSA)  LVOT Diameter: 2.30 cm    Mitral Valve Measurements:     MV E Vmax: 0.7 m/s  MV A Vmax: 1.0 m/s  MV E/A:    0.7       Tricuspid Valve Measurements:     TR Vmax:          2.8 m/s  TR Peak Gradient: 31.8 mmHg  RA Pressure:      15 mmHg  PASP:             47 mmHg         Interval History:  Patient resting comfortably in bed   Denies CP/SOB/palpitations/dizziness  No acute events overnight      Medications:  acetaminophen     Tablet .. 650 milliGRAM(s) Oral every 6 hours PRN  aluminum hydroxide/magnesium hydroxide/simethicone Suspension 30 milliLiter(s) Oral every 4 hours PRN  dextrose 5%. 1000 milliLiter(s) IV Continuous <Continuous>  dextrose 5%. 1000 milliLiter(s) IV Continuous <Continuous>  dextrose 50% Injectable 25 Gram(s) IV Push once  dextrose 50% Injectable 25 Gram(s) IV Push once  dextrose 50% Injectable 12.5 Gram(s) IV Push once  dextrose Oral Gel 15 Gram(s) Oral once PRN  furosemide   Injectable 40 milliGRAM(s) IV Push two times a day  glucagon  Injectable 1 milliGRAM(s) IntraMuscular once  hydrALAZINE 25 milliGRAM(s) Oral three times a day  influenza   Vaccine 0.5 milliLiter(s) IntraMuscular once  insulin lispro (ADMELOG) corrective regimen sliding scale   SubCutaneous three times a day before meals  insulin lispro (ADMELOG) corrective regimen sliding scale   SubCutaneous at bedtime  melatonin 3 milliGRAM(s) Oral at bedtime PRN  metoprolol succinate  milliGRAM(s) Oral daily  metoprolol succinate ER 50 milliGRAM(s) Oral daily  ondansetron Injectable 4 milliGRAM(s) IV Push every 8 hours PRN  sacubitril 24 mG/valsartan 26 mG 1 Tablet(s) Oral two times a day      Vitals:  T(C): 36.8 (24 @ 05:00), Max: 36.8 (24 @ 20:43)  HR: 94 (24 @ 05:00) (77 - 94)  BP: 125/88 (24 @ 05:00) (115/78 - 138/57)  BP(mean): --  RR: 18 (24 @ 05:00) (18 - 18)  SpO2: 97% (24 @ 05:00) (95% - 98%)    Daily     Daily Weight in k.2 (06 Mar 2024 05:00)        I&O's Summary    05 Mar 2024 07:01  -  06 Mar 2024 07:00  --------------------------------------------------------  IN: 350 mL / OUT: 1000 mL / NET: -650 mL    06 Mar 2024 07:01  -  06 Mar 2024 09:21  --------------------------------------------------------  IN: 0 mL / OUT: 600 mL / NET: -600 mL        Physical Exam:  Appearance: No Acute Distress  Cardiovascular: Normal S1 S2  Respiratory: Clear to auscultation bilaterally  Gastrointestinal: Soft, Non-tender	  Skin: No cyanosis	  Neurologic: Non-focal  Extremities: BLE edema  Psychiatry: A & O x 3    Labs:                        12.5   4.89  )-----------( 238      ( 06 Mar 2024 07:33 )             39.1     03-06    140  |  103  |  21  ----------------------------<  151<H>  3.6   |  28  |  0.98    Ca    8.4      06 Mar 2024 07:33  Phos  2.6     03-06  Mg     2.00     -    TPro  6.7  /  Alb  2.8<L>  /  TBili  0.9  /  DBili  x   /  AST  22  /  ALT  13  /  AlkPhos  278<H>        TELEMETRY: Sinus Rhythm with brief (9 beat) run of NSVT    Echocardiogram:  TTE Limited W or WO Ultrasound Enhancing Agent (24 @ 12:18)     _______________________________________________________________________________________     CONCLUSIONS:      1. Left ventricular cavity is mildly dilated. Left ventricular systolic function is severely decreased with an ejection fraction of 27 % by Suero's method of disks.   2. Enlarged right ventricular cavity size and reduced systolic function. Tricuspid annular plane systolic excursion (TAPSE) is 0.6 cm (normal >=1.7 cm).   3. The left atrium is moderately dilated.   4. Structurally normal mitral valve with normal leaflet excursion.   5. Symmetric mitral valve leaflet tethering.   6. Mild mitral regurgitation.   7. Estimated pulmonary artery systolic pressure is 47 mmHg.   8. The inferior venacava is dilated (dilated >2.1cm) with abnormal inspiratory collapse (abnormal <50%) consistent with elevated right atrial pressure (~15, range 10-20mmHg).   9. Bilateral pleural effusion noted.    ________________________________________________________________________________________  FINDINGS:     Left Ventricle:  The left ventricular cavity is mildly dilated. Left ventricular systolic function is severely decreased with a calculated ejection fraction of 27 % by the Suero's biplane method of disks. There is normal left ventricular diastolic function. There is increased LV mass and eccentric hypertrophy.     Right Ventricle:  The right ventricular cavity is enlarged in size and reduced systolic function. Tricuspid annular plane systolic excursion (TAPSE) is 0.6 cm (normal >=1.7 cm).     Left Atrium:  The left atrium is moderately dilated with an indexed volume of 43.24 ml/m².     Right Atrium:  The right atrium is normal in size.     Aortic Valve:  The aortic valve appears trileaflet with normal systolic excursion.     Mitral Valve:  Structurally normal mitral valve with normal leaflet excursion. There is symmetric leaflet tethering. There is mild mitral regurgitation.     Tricuspid Valve:  Structurally normal tricuspid valve with normal leaflet excursion. There is moderate tricuspid regurgitation. Estimated pulmonary artery systolic pressure is 47 mmHg.     Pulmonic Valve:  Structurally normal pulmonic valve with normal leaflet excursion. There is trace pulmonic regurgitation.     Aorta:  The aortic root at the sinuses of Valsalva is normal in size. The ascending aorta diameter is normal in size.     Pleura:  Bilateral pleural effusion noted.     Systemic Veins:  The inferior vena cava is dilated (dilated >2.1cm) with abnormal inspiratory collapse (abnormal <50%) consistent with elevated right atrial pressure (~15, range 10-20mmHg).  ____________________________________________________________________  QUANTITATIVE DATA:  Left Ventricle Measurements: (Indexed to BSA)    IVSd (2D):   1.4 cm  LVPWd (2D):  1.3 cm  LVIDd (2D):  5.9 cm  LVIDs (2D):  5.3 cm  LV Mass:     376 g  166.4 g/m²  LV Vol d, MOD A2C: 164.0 ml 72.59 ml/m²  LV Vol d, MOD A4C: 176.0 ml 77.90 ml/m²  LV Vol d, MOD BP:  175.4 ml 77.64 ml/m²  LV Vol s,MOD A2C: 120.0 ml 53.11 ml/m²  LV Vol s, MOD A4C: 133.0 ml 58.87 ml/m²  LV Vol s, MOD BP:  127.3 ml 56.35 ml/m²  LVOT SV MOD BP:    48.1 ml  LV EF% MOD BP:     27 %     MV E Vmax:    0.74 m/s  MV A Vmax:    1.00 m/s  MV E/A:       0.75  e' lateral:11.90 cm/s  e' medial:    6.08 cm/s  E/e' lateral: 6.26  E/e' medial:  12.25  E/e' Average: 9.14  MV DT:        86 msec       Left Atrium Measurements: (Indexed to BSA)  LA Diam 2D: 4.20 cm    Right Ventricle Measurements:     TAPSE:            0.6 cm  TV Jimena. S':       6.81 cm/s  RV Base (RVID1):  4.7 cm  RV Mid (RVID2):   2.7 cm  RV Major (RVID3): 8.6 cm       LVOT / RVOT/ Qp/Qs Data: (Indexed to BSA)  LVOT Diameter: 2.30 cm    Mitral Valve Measurements:     MV E Vmax: 0.7 m/s  MV A Vmax: 1.0 m/s  MV E/A:    0.7       Tricuspid Valve Measurements:     TR Vmax:          2.8 m/s  TR Peak Gradient: 31.8 mmHg  RA Pressure:      15 mmHg  PASP:             47 mmHg

## 2024-03-06 NOTE — PROGRESS NOTE ADULT - ASSESSMENT
A/P  63 yr old male with a pmh of HTN, HF rEF ( EF33%), T2DM on metformin who presents with ROSS and bilateral lower extremity edema with his legs feeling heavy.     #Acute on chronic systolic heart Failure : due to non complaince with meds : d/w pt and niece need to adhere with meds and need for AICD after re-evaluation of compliance   c/w diuresis   -started on entresto   c/w coreg   Cardio following   EF <35%      #syncope  #NSVT on tele   -EP cardio consulted : apprecaite input : In light of his noncompliance with optimal medical therapy, he is not a candidate for ICD for primary prevention at this time despite telemetry evidence of NSVT and LVEF 33%. . Our recommendation would be to continue aggressive diuresis while continuing GDMT for a duration of 3 months then reevaluate his LVEF. His ectopy burden will likely decrease when patient no longer in decompensated heart failure.   Keep K+ > 4.0.   K+ 3.4 today.  Please replete.  Increased beta blocker for NSVT.      tele monitoring    #Hypertensive   -BP improved   -continue hydralazine 25mg TID, metoprolol XL 50mg daily and lasix   - entresto added    #DM-2   a1c 7.1  c/w Insulin / FSBS    #HLD  -cont statin    discussed w karin , nurse and  pt : check O2 on RA on exertion     FULL CODE

## 2024-03-06 NOTE — PROGRESS NOTE ADULT - ASSESSMENT
63 yr old male with a pmh of HTN, nonischemic cardiomyopathy ( EF33% this admission echo), HFrEF, T2DM on metformin who presented with acute on chronic ADHF. This has been progressing since Thanksgiving 2023. Pt endorses 2 falls due to lower extremity weakness but denies syncope. Patient states he was admitted to [facility] last year for similar symptoms. He has been noncompliant with medications for unclear reasons. ICD was never discussed. During this admission he has been in sinus rhythm with frequent episodes of NSVT @160 bpm, longest run 39 beats overnight.  Asymptomatic. Patient being aggressively diuresed. Beta blockers increased for ectopy suppression. EP consulted for possible ICD.   TTE: LVEF 33% RV dysfunction   LHC: coronaries with minor luminal irregularities      Continue telemetry monitoring  Toprol 100/50mg daily (hold SBP<90 and HR <55)  Monitor lytes and replete K>4.0 and Mg>2.0   NSVT overnight while sleeping suggesting possible RADHA as a contributing factor; check nocturnal pulse oximetry   Patient is not a candidate for primary prevention ICD at this time due to non-compliance; will re-evaluate in 3 months     Appreciate Cardiology/HF recommendations; continuine GDMT    Management per primary teamontinuing GDMT for a duration of 3 months then

## 2024-03-06 NOTE — PROGRESS NOTE ADULT - ASSESSMENT
ECHO 3/4/23: EF 33% mild MR, Severe global left ventricular systolic dysfunction  cardiac cath from 3/6/23  Coronaries with minor luminal irregularities. Elevated LVEDP.     A/P  63 yr old male with a pmh of HTN, HF rEF ( EF33%), T2DM on metformin who presents with ROSS and bilateral lower extremity edema with his legs feeling heavy.     #Acute on chronic HFrEF  -volume overloaded  -increase iv lasix to 60 mg BID   -Not compliant with home meds (lasix 40mg daily, metoprolol XL 50mg daily, hydralazine 25mg TID)  -repeat echo this admission with ef 27%   Left ventricular systolic function is severely decreased  -Prior cardiac cath from 3/6/23  Coronaries with minor luminal irregularities. Elevated LVEDP.   -strict I/O, monitor daily weights, fluid restriction   -Still with frequent NSVT   -c/w toprol -- increase to 200 mg daily    -eps eval for NSVT, possible  ICD/CRTD for cmp, HF- per EP In light of his noncompliance with optimal medical therapy, he is not a candidate for ICD   -will reassess in 3 months   -cw entresto   -suppl K     #syncope, loc  -depressed ed, ivcd, nsvt on tele, up to 31 beats  -eps eval noted   -ct head unremarkable     #Hypertensive urgency.   -bp improved   -continue hydralazine, BB, entresto, lasix      #T2DM (type 2 diabetes mellitus).   -med f/u     #HLD  -cont statin        dvt ppx   ECHO 3/4/23: EF 33% mild MR, Severe global left ventricular systolic dysfunction  cardiac cath from 3/6/23  Coronaries with minor luminal irregularities. Elevated LVEDP.     A/P  63 yr old male with a pmh of HTN, HF rEF ( EF33%), T2DM on metformin who presents with ROSS and bilateral lower extremity edema with his legs feeling heavy.     #Acute on chronic HFrEF  -volume overloaded  -increase iv lasix to 60 mg BID   -Not compliant with home meds (lasix 40mg daily, metoprolol XL 50mg daily, hydralazine 25mg TID)  -repeat echo this admission with ef 27%   Left ventricular systolic function is severely decreased  -Prior cardiac cath from 3/6/23  Coronaries with minor luminal irregularities. Elevated LVEDP.   -strict I/O, monitor daily weights, fluid restriction   -Still with frequent NSVT   -c/w toprol -- increase to 200 mg daily    -eps eval for NSVT, possible  ICD/CRTD for cmp, HF- per EP In light of his noncompliance with optimal medical therapy, he is not a candidate for ICD   -will reassess in 3 months   -[employer] insurance does not cover entresto -- plan to receive last dose tonight-- start cozaar tomorrow AM    -suppl K     #syncope, loc  -depressed ed, ivcd, nsvt on tele, up to 31 beats  -eps eval noted   -ct head unremarkable     #Hypertensive urgency.   -bp improved   -continue hydralazine, BB, entresto, lasix      #T2DM (type 2 diabetes mellitus).   -med f/u     #HLD  -cont statin        dvt ppx

## 2024-03-07 LAB
GLUCOSE BLDC GLUCOMTR-MCNC: 119 MG/DL — HIGH (ref 70–99)
GLUCOSE BLDC GLUCOMTR-MCNC: 119 MG/DL — HIGH (ref 70–99)
GLUCOSE BLDC GLUCOMTR-MCNC: 156 MG/DL — HIGH (ref 70–99)
GLUCOSE BLDC GLUCOMTR-MCNC: 159 MG/DL — HIGH (ref 70–99)

## 2024-03-07 PROCEDURE — 99232 SBSQ HOSP IP/OBS MODERATE 35: CPT

## 2024-03-07 RX ADMIN — Medication 25 MILLIGRAM(S): at 21:21

## 2024-03-07 RX ADMIN — Medication 1: at 17:39

## 2024-03-07 RX ADMIN — Medication 25 MILLIGRAM(S): at 05:13

## 2024-03-07 RX ADMIN — Medication 60 MILLIGRAM(S): at 05:14

## 2024-03-07 RX ADMIN — Medication 200 MILLIGRAM(S): at 05:13

## 2024-03-07 RX ADMIN — Medication 25 MILLIGRAM(S): at 13:45

## 2024-03-07 RX ADMIN — LOSARTAN POTASSIUM 25 MILLIGRAM(S): 100 TABLET, FILM COATED ORAL at 05:14

## 2024-03-07 RX ADMIN — Medication 1: at 13:01

## 2024-03-07 RX ADMIN — Medication 60 MILLIGRAM(S): at 13:02

## 2024-03-07 NOTE — PROGRESS NOTE ADULT - ASSESSMENT
ECHO 3/4/23: EF 33% mild MR, Severe global left ventricular systolic dysfunction  cardiac cath from 3/6/23  Coronaries with minor luminal irregularities. Elevated LVEDP.     A/P  63 yr old male with a pmh of HTN, HF rEF ( EF33%), T2DM on metformin who presents with ROSS and bilateral lower extremity edema with his legs feeling heavy.     #Acute on chronic HFrEF  -volume overloaded  -cont iv lasix to 60 mg BID   -Not compliant with home meds (lasix 40mg daily, metoprolol XL 50mg daily, hydralazine 25mg TID)  -repeat echo this admission with ef 27%   Left ventricular systolic function is severely decreased  -Prior cardiac cath from 3/6/23  Coronaries with minor luminal irregularities. Elevated LVEDP.   -strict I/O, monitor daily weights, fluid restriction   -less NSVT   -c/w toprol     -eps eval for NSVT, possible  ICD/CRTD for cmp, HF- per EP In light of his noncompliance with optimal medical therapy, he is not a candidate for ICD   -will reassess in 3 months   -[employer] insurance does not cover entresto -- plan to receive last dose tonight-- start cozaar tomorrow AM    -suppl K     #syncope, loc  -depressed ed, ivcd, nsvt on tele, up to 31 beats  -eps eval noted   -ct head unremarkable     #Hypertensive urgency.   -bp improved   -continue hydralazine, BB, entresto, lasix      #T2DM (type 2 diabetes mellitus).   -med f/u     #HLD  -cont statin        dvt ppx    35 minutes spent on total encounter; more than 50% of the visit was spent counseling and/or coordinating care by the attending physician.

## 2024-03-07 NOTE — PROGRESS NOTE ADULT - SUBJECTIVE AND OBJECTIVE BOX
CARDIOLOGY FOLLOW UP - Dr. Zavala  Date of Service: 3/7/2024  CC: no events    Review of Systems:  Constitutional: No fever, weight loss, or fatigue  Respiratory: No cough, wheezing, or hemoptysis, no shortness of breath  Cardiovascular: No chest pain, palpitations, passing out, dizziness, or leg swelling  Gastrointestinal: No abd or epigastric pain. No nausea, vomiting, or hematemesis; no diarrhea or consiptaiton, no melena or hematochezia  Vascular: No edema     TELEMETRY:    PHYSICAL EXAM:  T(C): 36.3 (03-07-24 @ 12:32), Max: 36.8 (03-07-24 @ 05:00)  HR: 67 (03-07-24 @ 12:32) (67 - 101)  BP: 109/68 (03-07-24 @ 12:32) (109/68 - 137/92)  RR: 18 (03-07-24 @ 12:32) (18 - 18)  SpO2: 98% (03-07-24 @ 12:32) (98% - 99%)  Wt(kg): --  I&O's Summary    06 Mar 2024 07:01  -  07 Mar 2024 07:00  --------------------------------------------------------  IN: 550 mL / OUT: 1000 mL / NET: -450 mL    07 Mar 2024 07:01  -  07 Mar 2024 12:36  --------------------------------------------------------  IN: 540 mL / OUT: 500 mL / NET: 40 mL        Appearance: Normal	  Cardiovascular: Normal S1 S2,RRR, No JVD, No murmurs  Respiratory: Lungs clear to auscultation	  Gastrointestinal:  Soft, Non-tender, + BS	  Extremities: Normal range of motion, No clubbing, cyanosis or edema  Vascular: Peripheral pulses palpable 2+ bilaterally       Home Medications:  METFORMIN HCL 1,000 MG TABLET: TAKE 1 TABLET BY MOUTH TWICE A DAY WITH MEALS (02 Mar 2024 20:59)  METOPROLOL SUCC ER 50 MG TAB: TAKE 1 TABLET BY MOUTH EVERY DAY (02 Mar 2024 20:59)        MEDICATIONS  (STANDING):  dextrose 5%. 1000 milliLiter(s) (100 mL/Hr) IV Continuous <Continuous>  dextrose 5%. 1000 milliLiter(s) (50 mL/Hr) IV Continuous <Continuous>  dextrose 50% Injectable 25 Gram(s) IV Push once  dextrose 50% Injectable 12.5 Gram(s) IV Push once  dextrose 50% Injectable 25 Gram(s) IV Push once  furosemide   Injectable 60 milliGRAM(s) IV Push two times a day  glucagon  Injectable 1 milliGRAM(s) IntraMuscular once  hydrALAZINE 25 milliGRAM(s) Oral three times a day  influenza   Vaccine 0.5 milliLiter(s) IntraMuscular once  insulin lispro (ADMELOG) corrective regimen sliding scale   SubCutaneous three times a day before meals  insulin lispro (ADMELOG) corrective regimen sliding scale   SubCutaneous at bedtime  losartan 25 milliGRAM(s) Oral daily  metoprolol succinate  milliGRAM(s) Oral daily        EKG:  RADIOLOGY:  DIAGNOSTIC TESTING:  [ ] Echocardiogram:  [ ] Catherterization:  [ ] Stress Test:  OTHER:     LABS:	 	                          12.5   4.89  )-----------( 238      ( 06 Mar 2024 07:33 )             39.1     03-06    140  |  103  |  21  ----------------------------<  151<H>  3.6   |  28  |  0.98    Ca    8.4      06 Mar 2024 07:33  Phos  2.6     03-06  Mg     2.00     03-06    TPro  6.7  /  Alb  2.8<L>  /  TBili  0.9  /  DBili  x   /  AST  22  /  ALT  13  /  AlkPhos  278<H>  03-06          CARDIAC MARKERS:

## 2024-03-07 NOTE — PROGRESS NOTE ADULT - SUBJECTIVE AND OBJECTIVE BOX
Patient is a 63y old  Male who presents with a chief complaint of Acute decompensated heart failure (06 Mar 2024 10:43)  Denies palpitations or dizziness associated with NSVT.      PAST MEDICAL & SURGICAL HISTORY:  HTN (hypertension)    HLD (hyperlipidemia)    DM (diabetes mellitus)    HFrEF (heart failure with reduced ejection fraction)    No significant past surgical history        MEDICATIONS  (STANDING):  dextrose 5%. 1000 milliLiter(s) (100 mL/Hr) IV Continuous <Continuous>  dextrose 5%. 1000 milliLiter(s) (50 mL/Hr) IV Continuous <Continuous>  dextrose 50% Injectable 25 Gram(s) IV Push once  dextrose 50% Injectable 12.5 Gram(s) IV Push once  dextrose 50% Injectable 25 Gram(s) IV Push once  furosemide   Injectable 60 milliGRAM(s) IV Push two times a day  glucagon  Injectable 1 milliGRAM(s) IntraMuscular once  hydrALAZINE 25 milliGRAM(s) Oral three times a day  influenza   Vaccine 0.5 milliLiter(s) IntraMuscular once  insulin lispro (ADMELOG) corrective regimen sliding scale   SubCutaneous three times a day before meals  insulin lispro (ADMELOG) corrective regimen sliding scale   SubCutaneous at bedtime  losartan 25 milliGRAM(s) Oral daily  metoprolol succinate  milliGRAM(s) Oral daily    MEDICATIONS  (PRN):  acetaminophen     Tablet .. 650 milliGRAM(s) Oral every 6 hours PRN Temp greater or equal to 38C (100.4F), Mild Pain (1 - 3)  aluminum hydroxide/magnesium hydroxide/simethicone Suspension 30 milliLiter(s) Oral every 4 hours PRN Dyspepsia  dextrose Oral Gel 15 Gram(s) Oral once PRN Blood Glucose LESS THAN 70 milliGRAM(s)/deciliter  melatonin 3 milliGRAM(s) Oral at bedtime PRN Insomnia  ondansetron Injectable 4 milliGRAM(s) IV Push every 8 hours PRN Nausea and/or Vomiting            Vital Signs Last 24 Hrs  T(C): 36.8 (07 Mar 2024 05:00), Max: 36.8 (07 Mar 2024 05:00)  T(F): 98.3 (07 Mar 2024 05:00), Max: 98.3 (07 Mar 2024 05:00)  HR: 84 (07 Mar 2024 05:00) (84 - 101)  BP: 131/83 (07 Mar 2024 05:00) (114/76 - 137/92)  BP(mean): --  RR: 18 (07 Mar 2024 05:00) (17 - 18)  SpO2: 99% (07 Mar 2024 05:00) (98% - 99%)    Parameters below as of 07 Mar 2024 05:00  Patient On (Oxygen Delivery Method): room air                INTERPRETATION OF TELEMETRY: SR with short bursts of NSVT up to 10 -17 beats    ECG:        LABS:                        12.5   4.89  )-----------( 238      ( 06 Mar 2024 07:33 )             39.1     03-06    140  |  103  |  21  ----------------------------<  151<H>  3.6   |  28  |  0.98    Ca    8.4      06 Mar 2024 07:33  Phos  2.6     03-06  Mg     2.00     03-06    TPro  6.7  /  Alb  2.8<L>  /  TBili  0.9  /  DBili  x   /  AST  22  /  ALT  13  /  AlkPhos  278<H>  03-06          Urinalysis Basic - ( 06 Mar 2024 07:33 )    Color: x / Appearance: x / SG: x / pH: x  Gluc: 151 mg/dL / Ketone: x  / Bili: x / Urobili: x   Blood: x / Protein: x / Nitrite: x   Leuk Esterase: x / RBC: x / WBC x   Sq Epi: x / Non Sq Epi: x / Bacteria: x        BNP  RADIOLOGY & ADDITIONAL STUDIES:    CONCLUSIONS:      1. Left ventricular cavity is mildly dilated. Left ventricular systolic function is severely decreased with an ejection fraction of 27 % by Suero's method of disks.   2. Enlarged right ventricular cavity size and reduced systolic function. Tricuspid annular plane systolic excursion (TAPSE) is 0.6 cm (normal >=1.7 cm).   3. The left atrium is moderately dilated.   4. Structurally normal mitral valve with normal leaflet excursion.   5. Symmetric mitral valve leaflet tethering.   6. Mild mitral regurgitation.   7. Estimated pulmonary artery systolic pressure is 47 mmHg.   8. The inferior venacava is dilated (dilated >2.1cm) with abnormal inspiratory collapse (abnormal <50%) consistent with elevated right atrial pressure (~15, range 10-20mmHg).   9. Bilateral pleural effusion noted.    ________________________________________________________________________________________      PHYSICAL EXAM:    GENERAL: In no apparent distress  +obese  NECK: Supple and normal thyroid.  mild JVD no carotid bruit.  Carotid pulse is 2+ bilaterally.  HEART: Regular rate and rhythm; No murmurs, rubs, or gallops.  PULMONARY: +basilar rales, no wheezing, or rhonchi bilaterally.  ABDOMEN: Soft, Nontender; Bowel sounds present  +edematous /distended   EXTREMITIES:  2+ Peripheral Pulses, No clubbing, cyanosis, +edema silviano LE  NEUROLOGICAL: Grossly nonfocal

## 2024-03-07 NOTE — PROGRESS NOTE ADULT - ASSESSMENT
63 yr old male with a pmh of HTN, nonischemic cardiomyopathy ( EF33% this admission echo), HFrEF, T2DM on metformin who presented with acute on chronic ADHF. This has been progressing since Thanksgiving 2023. Pt endorses 2 falls due to lower extremity weakness but denies syncope. Patient states he was admitted to [facility] last year for similar symptoms. He has been noncompliant with medications for unclear reasons. ICD was never discussed. During this admission he has been in sinus rhythm with frequent episodes of NSVT @160 bpm, longest run 39 beats overnight.  Asymptomatic. Patient being aggressively diuresed. Beta blockers increased for ectopy suppression. EP consulted for possible ICD.   TTE: LVEF 33% RV dysfunction   LHC: coronaries with minor luminal irregularities      Continue telemetry monitoring  Continue Toprol 200mg daily  Monitor lytes and replete K>4.0 and Mg>2.0   NSVT's -asymptomatic  while sleeping suggesting possible RADHA as a contributing factor; check nocturnal pulse oximetry   Patient is not a candidate for primary prevention ICD at this time due to non-compliance; will re-evaluate in 3 months     Appreciate Cardiology/HF recommendations; continue GDMT    Management per primary team

## 2024-03-08 LAB
ANION GAP SERPL CALC-SCNC: 14 MMOL/L — SIGNIFICANT CHANGE UP (ref 7–14)
BASOPHILS # BLD AUTO: 0.02 K/UL — SIGNIFICANT CHANGE UP (ref 0–0.2)
BASOPHILS NFR BLD AUTO: 0.4 % — SIGNIFICANT CHANGE UP (ref 0–2)
BUN SERPL-MCNC: 29 MG/DL — HIGH (ref 7–23)
CALCIUM SERPL-MCNC: 8.8 MG/DL — SIGNIFICANT CHANGE UP (ref 8.4–10.5)
CHLORIDE SERPL-SCNC: 101 MMOL/L — SIGNIFICANT CHANGE UP (ref 98–107)
CO2 SERPL-SCNC: 25 MMOL/L — SIGNIFICANT CHANGE UP (ref 22–31)
CREAT SERPL-MCNC: 1.16 MG/DL — SIGNIFICANT CHANGE UP (ref 0.5–1.3)
EGFR: 70 ML/MIN/1.73M2 — SIGNIFICANT CHANGE UP
EOSINOPHIL # BLD AUTO: 0.1 K/UL — SIGNIFICANT CHANGE UP (ref 0–0.5)
EOSINOPHIL NFR BLD AUTO: 1.9 % — SIGNIFICANT CHANGE UP (ref 0–6)
GLUCOSE BLDC GLUCOMTR-MCNC: 117 MG/DL — HIGH (ref 70–99)
GLUCOSE BLDC GLUCOMTR-MCNC: 141 MG/DL — HIGH (ref 70–99)
GLUCOSE BLDC GLUCOMTR-MCNC: 91 MG/DL — SIGNIFICANT CHANGE UP (ref 70–99)
GLUCOSE BLDC GLUCOMTR-MCNC: 91 MG/DL — SIGNIFICANT CHANGE UP (ref 70–99)
GLUCOSE SERPL-MCNC: 113 MG/DL — HIGH (ref 70–99)
HCT VFR BLD CALC: 38.6 % — LOW (ref 39–50)
HGB BLD-MCNC: 12.3 G/DL — LOW (ref 13–17)
IANC: 3.06 K/UL — SIGNIFICANT CHANGE UP (ref 1.8–7.4)
IMM GRANULOCYTES NFR BLD AUTO: 0.2 % — SIGNIFICANT CHANGE UP (ref 0–0.9)
LYMPHOCYTES # BLD AUTO: 1.42 K/UL — SIGNIFICANT CHANGE UP (ref 1–3.3)
LYMPHOCYTES # BLD AUTO: 26.5 % — SIGNIFICANT CHANGE UP (ref 13–44)
MAGNESIUM SERPL-MCNC: 2 MG/DL — SIGNIFICANT CHANGE UP (ref 1.6–2.6)
MCHC RBC-ENTMCNC: 28.6 PG — SIGNIFICANT CHANGE UP (ref 27–34)
MCHC RBC-ENTMCNC: 31.9 GM/DL — LOW (ref 32–36)
MCV RBC AUTO: 89.8 FL — SIGNIFICANT CHANGE UP (ref 80–100)
MONOCYTES # BLD AUTO: 0.74 K/UL — SIGNIFICANT CHANGE UP (ref 0–0.9)
MONOCYTES NFR BLD AUTO: 13.8 % — SIGNIFICANT CHANGE UP (ref 2–14)
NEUTROPHILS # BLD AUTO: 3.06 K/UL — SIGNIFICANT CHANGE UP (ref 1.8–7.4)
NEUTROPHILS NFR BLD AUTO: 57.2 % — SIGNIFICANT CHANGE UP (ref 43–77)
NRBC # BLD: 0 /100 WBCS — SIGNIFICANT CHANGE UP (ref 0–0)
NRBC # FLD: 0 K/UL — SIGNIFICANT CHANGE UP (ref 0–0)
PHOSPHATE SERPL-MCNC: 3.4 MG/DL — SIGNIFICANT CHANGE UP (ref 2.5–4.5)
PLATELET # BLD AUTO: 250 K/UL — SIGNIFICANT CHANGE UP (ref 150–400)
POTASSIUM SERPL-MCNC: 3.8 MMOL/L — SIGNIFICANT CHANGE UP (ref 3.5–5.3)
POTASSIUM SERPL-SCNC: 3.8 MMOL/L — SIGNIFICANT CHANGE UP (ref 3.5–5.3)
RBC # BLD: 4.3 M/UL — SIGNIFICANT CHANGE UP (ref 4.2–5.8)
RBC # FLD: 15.9 % — HIGH (ref 10.3–14.5)
SODIUM SERPL-SCNC: 140 MMOL/L — SIGNIFICANT CHANGE UP (ref 135–145)
WBC # BLD: 5.35 K/UL — SIGNIFICANT CHANGE UP (ref 3.8–10.5)
WBC # FLD AUTO: 5.35 K/UL — SIGNIFICANT CHANGE UP (ref 3.8–10.5)

## 2024-03-08 PROCEDURE — 99232 SBSQ HOSP IP/OBS MODERATE 35: CPT

## 2024-03-08 RX ORDER — BUMETANIDE 0.25 MG/ML
0.5 INJECTION INTRAMUSCULAR; INTRAVENOUS
Qty: 20 | Refills: 0 | Status: DISCONTINUED | OUTPATIENT
Start: 2024-03-08 | End: 2024-03-12

## 2024-03-08 RX ADMIN — BUMETANIDE 2.5 MG/HR: 0.25 INJECTION INTRAMUSCULAR; INTRAVENOUS at 14:27

## 2024-03-08 RX ADMIN — Medication 25 MILLIGRAM(S): at 14:26

## 2024-03-08 RX ADMIN — Medication 60 MILLIGRAM(S): at 05:38

## 2024-03-08 RX ADMIN — Medication 200 MILLIGRAM(S): at 05:38

## 2024-03-08 RX ADMIN — Medication 25 MILLIGRAM(S): at 05:38

## 2024-03-08 RX ADMIN — Medication 25 MILLIGRAM(S): at 21:34

## 2024-03-08 RX ADMIN — LOSARTAN POTASSIUM 25 MILLIGRAM(S): 100 TABLET, FILM COATED ORAL at 05:38

## 2024-03-08 NOTE — PROGRESS NOTE ADULT - SUBJECTIVE AND OBJECTIVE BOX
INCOMPLETE    Patient seen and examined at bedside.    Overnight Events:     REVIEW OF SYSTEMS:  as above.    Current Meds:  acetaminophen     Tablet .. 650 milliGRAM(s) Oral every 6 hours PRN  aluminum hydroxide/magnesium hydroxide/simethicone Suspension 30 milliLiter(s) Oral every 4 hours PRN  dextrose 5%. 1000 milliLiter(s) IV Continuous <Continuous>  dextrose 5%. 1000 milliLiter(s) IV Continuous <Continuous>  dextrose 50% Injectable 25 Gram(s) IV Push once  dextrose 50% Injectable 12.5 Gram(s) IV Push once  dextrose 50% Injectable 25 Gram(s) IV Push once  dextrose Oral Gel 15 Gram(s) Oral once PRN  furosemide   Injectable 60 milliGRAM(s) IV Push two times a day  glucagon  Injectable 1 milliGRAM(s) IntraMuscular once  hydrALAZINE 25 milliGRAM(s) Oral three times a day  influenza   Vaccine 0.5 milliLiter(s) IntraMuscular once  insulin lispro (ADMELOG) corrective regimen sliding scale   SubCutaneous three times a day before meals  insulin lispro (ADMELOG) corrective regimen sliding scale   SubCutaneous at bedtime  losartan 25 milliGRAM(s) Oral daily  melatonin 3 milliGRAM(s) Oral at bedtime PRN  metoprolol succinate  milliGRAM(s) Oral daily  ondansetron Injectable 4 milliGRAM(s) IV Push every 8 hours PRN      Vitals:  T(F): 97.4 (03-08), Max: 97.9 (03-07)  HR: 87 (03-08) (67 - 87)  BP: 149/94 (03-08) (109/68 - 149/94)  RR: 18 (03-08)  SpO2: 98% (03-08)  I&O's Summary    07 Mar 2024 07:01  -  08 Mar 2024 07:00  --------------------------------------------------------  IN: 780 mL / OUT: 1100 mL / NET: -320 mL    08 Mar 2024 07:01  -  08 Mar 2024 10:01  --------------------------------------------------------  IN: 200 mL / OUT: 350 mL / NET: -150 mL        Physical Exam:  Appearance: No acute distress; well appearing  Eyes: PERRL, EOMI, pink conjunctiva  HEENT: Normal oral mucosa  Cardiovascular: RRR, S1, S2, no murmurs, rubs, or gallops; no edema; no JVD  Respiratory: Clear to auscultation bilaterally  Gastrointestinal: soft, non-tender, non-distended with normal bowel sounds  Musculoskeletal: No clubbing; no joint deformity   Neurologic: Non-focal  Lymphatic: No lymphadenopathy  Psychiatry: AAOx3, mood & affect appropriate  Skin: No rashes, ecchymoses, or cyanosis                          12.3   5.35  )-----------( 250      ( 08 Mar 2024 03:54 )             38.6     03-08    140  |  101  |  29<H>  ----------------------------<  113<H>  3.8   |  25  |  1.16    Ca    8.8      08 Mar 2024 03:54  Phos  3.4     03-08  Mg     2.00     03-08        TTE 3/4/2024  CONCLUSIONS:   1. Left ventricular cavity is mildly dilated. Left ventricular systolic function is severely decreased with an ejection fraction of 27 % by Suero's method of disks.   2. Enlarged right ventricular cavity size and reduced systolic function. Tricuspid annular plane systolic excursion (TAPSE) is 0.6 cm (normal >=1.7 cm).   3. The left atrium is moderately dilated.   4. Structurally normal mitral valve with normal leaflet excursion.   5. Symmetric mitral valve leaflet tethering.   6. Mild mitral regurgitation.   7. Estimated pulmonary artery systolic pressure is 47 mmHg.   8. The inferior venacava is dilated (dilated >2.1cm) with abnormal inspiratory collapse (abnormal <50%) consistent with elevated right atrial pressure (~15, range 10-20mmHg).   9. Bilateral pleural effusion noted.    CATH 3/2023  Diagnostic Conclusions:   Coronaries with minor luminal irregularities. Elevated LVEDP.     Recommendations:   Optimize medical therapy for nonischemic cardiomyopathy.     Diagnostic Findings:     Coronary Angiography   The coronary circulation is right dominant. Cardiac catheterization  was performed electively.    LM   Leftmain artery: Normal.      LAD   Left anterior descending artery: Angiography shows minor  irregularities.    CX   Circumflex: Angiography shows minor irregularities.      RCA   Right coronary artery: Angiography shows minor irregularities.      Left Heart Cath   Left ventricular function was not assessed.         Patient seen and examined at bedside.    Overnight Events:   NAEO   Telemetry notable for SR with frequent PVCs.   Denies acute complaints. Reports improvement in breathing. Not at baseline. Says that his UOP has slowed down.     REVIEW OF SYSTEMS:  as above.    Current Meds:  acetaminophen     Tablet .. 650 milliGRAM(s) Oral every 6 hours PRN  aluminum hydroxide/magnesium hydroxide/simethicone Suspension 30 milliLiter(s) Oral every 4 hours PRN  dextrose 5%. 1000 milliLiter(s) IV Continuous <Continuous>  dextrose 5%. 1000 milliLiter(s) IV Continuous <Continuous>  dextrose 50% Injectable 25 Gram(s) IV Push once  dextrose 50% Injectable 12.5 Gram(s) IV Push once  dextrose 50% Injectable 25 Gram(s) IV Push once  dextrose Oral Gel 15 Gram(s) Oral once PRN  furosemide   Injectable 60 milliGRAM(s) IV Push two times a day  glucagon  Injectable 1 milliGRAM(s) IntraMuscular once  hydrALAZINE 25 milliGRAM(s) Oral three times a day  influenza   Vaccine 0.5 milliLiter(s) IntraMuscular once  insulin lispro (ADMELOG) corrective regimen sliding scale   SubCutaneous three times a day before meals  insulin lispro (ADMELOG) corrective regimen sliding scale   SubCutaneous at bedtime  losartan 25 milliGRAM(s) Oral daily  melatonin 3 milliGRAM(s) Oral at bedtime PRN  metoprolol succinate  milliGRAM(s) Oral daily  ondansetron Injectable 4 milliGRAM(s) IV Push every 8 hours PRN      Vitals:  T(F): 97.4 (03-08), Max: 97.9 (03-07)  HR: 87 (03-08) (67 - 87)  BP: 149/94 (03-08) (109/68 - 149/94)  RR: 18 (03-08)  SpO2: 98% (03-08)  I&O's Summary    07 Mar 2024 07:01  -  08 Mar 2024 07:00  --------------------------------------------------------  IN: 780 mL / OUT: 1100 mL / NET: -320 mL    08 Mar 2024 07:01  -  08 Mar 2024 10:01  --------------------------------------------------------  IN: 200 mL / OUT: 350 mL / NET: -150 mL        Physical Exam:  Appearance: No acute distress; well appearing  Cardiovascular: RRR, S1, S2, no murmurs, rubs, or gallops; no edema; no JVD  Respiratory: bibasilar crackles.   Gastrointestinal: soft, non-tender, non-distended with normal bowel sounds  Musculoskeletal:2+ BLE edema   Neurologic: Non-focal  Lymphatic: No lymphadenopathy  Psychiatry: AAOx3, mood & affect appropriate  Skin: No rashes, ecchymoses, or cyanosis                          12.3   5.35  )-----------( 250      ( 08 Mar 2024 03:54 )             38.6     03-08    140  |  101  |  29<H>  ----------------------------<  113<H>  3.8   |  25  |  1.16    Ca    8.8      08 Mar 2024 03:54  Phos  3.4     03-08  Mg     2.00     03-08        TTE 3/4/2024  CONCLUSIONS:   1. Left ventricular cavity is mildly dilated. Left ventricular systolic function is severely decreased with an ejection fraction of 27 % by Suero's method of disks.   2. Enlarged right ventricular cavity size and reduced systolic function. Tricuspid annular plane systolic excursion (TAPSE) is 0.6 cm (normal >=1.7 cm).   3. The left atrium is moderately dilated.   4. Structurally normal mitral valve with normal leaflet excursion.   5. Symmetric mitral valve leaflet tethering.   6. Mild mitral regurgitation.   7. Estimated pulmonary artery systolic pressure is 47 mmHg.   8. The inferior venacava is dilated (dilated >2.1cm) with abnormal inspiratory collapse (abnormal <50%) consistent with elevated right atrial pressure (~15, range 10-20mmHg).   9. Bilateral pleural effusion noted.    CATH 3/2023  Diagnostic Conclusions:   Coronaries with minor luminal irregularities. Elevated LVEDP.     Recommendations:   Optimize medical therapy for nonischemic cardiomyopathy.     Diagnostic Findings:     Coronary Angiography   The coronary circulation is right dominant. Cardiac catheterization  was performed electively.    LM   Leftmain artery: Normal.      LAD   Left anterior descending artery: Angiography shows minor  irregularities.    CX   Circumflex: Angiography shows minor irregularities.      RCA   Right coronary artery: Angiography shows minor irregularities.      Left Heart Cath   Left ventricular function was not assessed.

## 2024-03-08 NOTE — PROGRESS NOTE ADULT - ASSESSMENT
64 yo man with hx HTN, NICM (EF 27%), T2DM on metformin who presented with acute on chronic ADHF. This has been progressing since Thanksgiving 2023. Pt endorses 2 falls due to lower extremity weakness but denies syncope. Patient states he was admitted to [facility] last year for similar symptoms. He has been noncompliant with medications for unclear reasons. ICD was never discussed. During this admission he has been in sinus rhythm with frequent episodes of NSVT @160 bpm, longest run 39 beats overnight.  Asymptomatic. Patient being aggressively diuresed. Beta blockers increased for ectopy suppression. EP consulted for possible ICD.   TTE: LVEF 27% RV dysfunction   LHC: coronaries with minor luminal irregularities  EKG from 3/2023 without RBBB/LBBB; normal QRS duration       Recommendations:  -monitor on telemetry   -maintain lytes K>4, Mag>2  -continue metoprolol succinate 200mg daily   -not a candidate for ICD at this time given GDMT noncompliance; re-eval in 3 months       Please see attending attestation for final recommendations.     Pablito Mosher MD  Cardiology Fellow      62 yo man with hx HTN, NICM (EF 27%), T2DM on metformin who presented with acute on chronic ADHF. This has been progressing since Thanksgiving 2023. Pt endorses 2 falls due to lower extremity weakness but denies syncope. Patient states he was admitted to [facility] last year for similar symptoms. He has been noncompliant with medications for unclear reasons. ICD was never discussed. During this admission he has been in sinus rhythm with frequent episodes of NSVT @160 bpm, longest run 39 beats overnight.  Asymptomatic. Patient being aggressively diuresed. Beta blockers increased for ectopy suppression. EP consulted for possible ICD.   TTE: LVEF 27% RV dysfunction   LHC: coronaries with minor luminal irregularities  EKG from 3/2023 without RBBB/LBBB; normal QRS duration       Recommendations:  -monitor on telemetry   -maintain lytes K>4, Mag>2  -continue metoprolol succinate 200mg daily   -not a candidate for ICD at this time given GDMT noncompliance; re-eval in 3 months   -continue with diuresis     Please see attending attestation for final recommendations.     Pablito Mosher MD  Cardiology Fellow

## 2024-03-08 NOTE — PROGRESS NOTE ADULT - ASSESSMENT
A/P  63 yr old male with a pmh of HTN, HF rEF ( EF33%), T2DM on metformin who presents with ROSS and bilateral lower extremity edema with his legs feeling heavy.     #Acute on chronic systolic heart Failure : due to non complaince with meds : d/w pt and niece need to adhere with meds and need for AICD after re-evaluation of compliance   c/w diuresis : seems to be still overloaded . would consider increasing diuretics / ? change to bumex or change to drip : will defer to cardio .. d/w pt and nursing   -started on entresto   c/w coreg   Cardio following   EF <35%        #syncope  #NSVT on tele   -EP cardio consulted : apprecaite input : In light of his noncompliance with optimal medical therapy, he is not a candidate for ICD for primary prevention at this time despite telemetry evidence of NSVT and LVEF 33%. . Our recommendation would be to continue aggressive diuresis while continuing GDMT for a duration of 3 months then reevaluate his LVEF. His ectopy burden will likely decrease when patient no longer in decompensated heart failure.   Keep K+ > 4.0.   K+ 3.4 today.  Please replete.  Increased beta blocker for NSVT.      tele monitoring    #Hypertensive   -BP improved   -continue hydralazine 25mg TID, metoprolol XL 50mg daily and lasix   - entresto added    #DM-2   a1c 7.1  c/w Insulin / FSBS    #HLD  -cont statin    discussed w niece , nurse and  pt : check O2 on RA on exertion     FULL CODE

## 2024-03-08 NOTE — PROGRESS NOTE ADULT - ASSESSMENT
ECHO 3/4/23: EF 33% mild MR, Severe global left ventricular systolic dysfunction  cardiac cath from 3/6/23  Coronaries with minor luminal irregularities. Elevated LVEDP.     A/P  63 yr old male with a pmh of HTN, HF rEF ( EF33%), T2DM on metformin who presents with ROSS and bilateral lower extremity edema with his legs feeling heavy.     #Acute on chronic HFrEF  -volume status improved  -change IV lasix to torsemide 20mg PO BID  -Not compliant with home meds (lasix 40mg daily, metoprolol XL 50mg daily, hydralazine 25mg TID)  -repeat echo this admission with ef 27%   Left ventricular systolic function is severely decreased  -Prior cardiac cath from 3/6/23  Coronaries with minor luminal irregularities. Elevated LVEDP.   -strict I/O, monitor daily weights, fluid restriction   -less NSVT   -c/w toprol     -eps eval for NSVT, possible  ICD/CRTD for cmp, HF- per EP In light of his noncompliance with optimal medical therapy, he is not a candidate for ICD   -will reassess in 3 months   -[employer] insurance does not cover entresto -- plan to receive last dose tonight-- start cozaar tomorrow AM    -suppl K     #syncope, loc  -depressed ed, ivcd, nsvt on tele, up to 31 beats  -eps eval noted   -ct head unremarkable     #Hypertensive urgency.   -bp improved   -continue hydralazine, BB, entresto, lasix      #T2DM (type 2 diabetes mellitus).   -med f/u     #HLD  -cont statin        dvt ppx    35 minutes spent on total encounter; more than 50% of the visit was spent counseling and/or coordinating care by the attending physician.     ECHO 3/4/23: EF 33% mild MR, Severe global left ventricular systolic dysfunction  cardiac cath from 3/6/23  Coronaries with minor luminal irregularities. Elevated LVEDP.     A/P  63 yr old male with a pmh of HTN, HF rEF ( EF33%), T2DM on metformin who presents with ROSS and bilateral lower extremity edema with his legs feeling heavy.     #Acute on chronic HFrEF  -change IV lasix to bumex gtt at 0.5mg/hr  -Not compliant with home meds (lasix 40mg daily, metoprolol XL 50mg daily, hydralazine 25mg TID)  -repeat echo this admission with ef 27%   Left ventricular systolic function is severely decreased  -Prior cardiac cath from 3/6/23  Coronaries with minor luminal irregularities. Elevated LVEDP.   -strict I/O, monitor daily weights, fluid restriction   -less NSVT   -c/w toprol     -eps eval for NSVT, possible  ICD/CRTD for cmp, HF- per EP In light of his noncompliance with optimal medical therapy, he is not a candidate for ICD   -will reassess in 3 months   -[employer] insurance does not cover entresto -- plan to receive last dose tonight-- start cozaar tomorrow AM    -suppl K     #syncope, loc  -depressed ed, ivcd, nsvt on tele, up to 31 beats  -eps eval noted   -ct head unremarkable     #Hypertensive urgency.   -bp improved   -continue hydralazine, BB, entresto, lasix      #T2DM (type 2 diabetes mellitus).   -med f/u     #HLD  -cont statin        dvt ppx    35 minutes spent on total encounter; more than 50% of the visit was spent counseling and/or coordinating care by the attending physician.

## 2024-03-08 NOTE — PROGRESS NOTE ADULT - SUBJECTIVE AND OBJECTIVE BOX
Date of service: 24 @ 11:38      Patient is a 63y old  Male who presents with a chief complaint of Acute decompensated heart failure (08 Mar 2024 11:03)                                                               INTERVAL HPI/OVERNIGHT EVENTS:    REVIEW OF SYSTEMS:     CONSTITUTIONAL: No weakness, fevers or chills  EYES/ENT: No visual changes , no ear ache   NECK: No pain or stiffness  RESPIRATORY: No cough, wheezing,  No shortness of breath  CARDIOVASCULAR: No chest pain or palpitations  GASTROINTESTINAL: No abdominal pain  . No nausea, vomiting, or hematemesis; No diarrhea or constipation. No melena or hematochezia.  GENITOURINARY: No dysuria, frequency or hematuria  NEUROLOGICAL: No numbness or weakness  SKIN: No itching, burning, rashes, or lesions                                                                                                                                                                                                                                                                                 Medications:  MEDICATIONS  (STANDING):  dextrose 5%. 1000 milliLiter(s) (100 mL/Hr) IV Continuous <Continuous>  dextrose 5%. 1000 milliLiter(s) (50 mL/Hr) IV Continuous <Continuous>  dextrose 50% Injectable 25 Gram(s) IV Push once  dextrose 50% Injectable 12.5 Gram(s) IV Push once  dextrose 50% Injectable 25 Gram(s) IV Push once  furosemide   Injectable 60 milliGRAM(s) IV Push two times a day  glucagon  Injectable 1 milliGRAM(s) IntraMuscular once  hydrALAZINE 25 milliGRAM(s) Oral three times a day  influenza   Vaccine 0.5 milliLiter(s) IntraMuscular once  insulin lispro (ADMELOG) corrective regimen sliding scale   SubCutaneous three times a day before meals  insulin lispro (ADMELOG) corrective regimen sliding scale   SubCutaneous at bedtime  losartan 25 milliGRAM(s) Oral daily  metoprolol succinate  milliGRAM(s) Oral daily    MEDICATIONS  (PRN):  acetaminophen     Tablet .. 650 milliGRAM(s) Oral every 6 hours PRN Temp greater or equal to 38C (100.4F), Mild Pain (1 - 3)  aluminum hydroxide/magnesium hydroxide/simethicone Suspension 30 milliLiter(s) Oral every 4 hours PRN Dyspepsia  dextrose Oral Gel 15 Gram(s) Oral once PRN Blood Glucose LESS THAN 70 milliGRAM(s)/deciliter  melatonin 3 milliGRAM(s) Oral at bedtime PRN Insomnia  ondansetron Injectable 4 milliGRAM(s) IV Push every 8 hours PRN Nausea and/or Vomiting       Allergies    No Known Allergies    Intolerances      Vital Signs Last 24 Hrs  T(C): 36.3 (08 Mar 2024 05:38), Max: 36.6 (07 Mar 2024 19:17)  T(F): 97.4 (08 Mar 2024 05:38), Max: 97.9 (07 Mar 2024 19:17)  HR: 87 (08 Mar 2024 05:38) (67 - 87)  BP: 149/94 (08 Mar 2024 05:38) (109/68 - 149/94)  BP(mean): --  RR: 18 (08 Mar 2024 05:38) (17 - 18)  SpO2: 98% (08 Mar 2024 05:38) (98% - 98%)    Parameters below as of 08 Mar 2024 05:38  Patient On (Oxygen Delivery Method): room air      CAPILLARY BLOOD GLUCOSE      POCT Blood Glucose.: 91 mg/dL (08 Mar 2024 08:38)  POCT Blood Glucose.: 119 mg/dL (07 Mar 2024 21:19)  POCT Blood Glucose.: 156 mg/dL (07 Mar 2024 17:07)  POCT Blood Glucose.: 159 mg/dL (07 Mar 2024 12:28)       @ 07:  -  08 @ 07:00  --------------------------------------------------------  IN: 780 mL / OUT: 1100 mL / NET: -320 mL    08 @ 07:  -  08 @ 11:38  --------------------------------------------------------  IN: 200 mL / OUT: 350 mL / NET: -150 mL      Physical Exam:    Daily     Daily Weight in k (08 Mar 2024 05:38)  General:  Well appearing, NAD, not cachetic  HEENT:  Nonicteric, PERRLA  CV:  RRR, S1S2   Lungs:  CTA B/L, no wheezes, rales, rhonchi  Abdomen:  Soft, non-tender, no distended, positive BS  Extremities: BLE edema   Neuro:  AAOx3, non-focal, grossly intact                                                                                                                                                                                                                                                                                                LABS:                               12.3   5.35  )-----------( 250      ( 08 Mar 2024 03:54 )             38.6                      03-08    140  |  101  |  29<H>  ----------------------------<  113<H>  3.8   |  25  |  1.16    Ca    8.8      08 Mar 2024 03:54  Phos  3.4     03-08  Mg     2.00     03-08                         RADIOLOGY & ADDITIONAL TESTS         I personally reviewed: [  ]EKG   [  ]CXR    [  ] CT      A/P:         Discussed with :     [facility] consultants' Notes   Time spent :

## 2024-03-09 LAB
ANION GAP SERPL CALC-SCNC: 15 MMOL/L — HIGH (ref 7–14)
BASOPHILS # BLD AUTO: 0.02 K/UL — SIGNIFICANT CHANGE UP (ref 0–0.2)
BASOPHILS NFR BLD AUTO: 0.4 % — SIGNIFICANT CHANGE UP (ref 0–2)
BUN SERPL-MCNC: 29 MG/DL — HIGH (ref 7–23)
CALCIUM SERPL-MCNC: 8.7 MG/DL — SIGNIFICANT CHANGE UP (ref 8.4–10.5)
CHLORIDE SERPL-SCNC: 99 MMOL/L — SIGNIFICANT CHANGE UP (ref 98–107)
CO2 SERPL-SCNC: 28 MMOL/L — SIGNIFICANT CHANGE UP (ref 22–31)
CREAT SERPL-MCNC: 1.19 MG/DL — SIGNIFICANT CHANGE UP (ref 0.5–1.3)
EGFR: 68 ML/MIN/1.73M2 — SIGNIFICANT CHANGE UP
EOSINOPHIL # BLD AUTO: 0.17 K/UL — SIGNIFICANT CHANGE UP (ref 0–0.5)
EOSINOPHIL NFR BLD AUTO: 3.7 % — SIGNIFICANT CHANGE UP (ref 0–6)
GLUCOSE BLDC GLUCOMTR-MCNC: 129 MG/DL — HIGH (ref 70–99)
GLUCOSE BLDC GLUCOMTR-MCNC: 155 MG/DL — HIGH (ref 70–99)
GLUCOSE BLDC GLUCOMTR-MCNC: 158 MG/DL — HIGH (ref 70–99)
GLUCOSE BLDC GLUCOMTR-MCNC: 88 MG/DL — SIGNIFICANT CHANGE UP (ref 70–99)
GLUCOSE SERPL-MCNC: 96 MG/DL — SIGNIFICANT CHANGE UP (ref 70–99)
HCT VFR BLD CALC: 39.4 % — SIGNIFICANT CHANGE UP (ref 39–50)
HGB BLD-MCNC: 12.8 G/DL — LOW (ref 13–17)
IANC: 2.54 K/UL — SIGNIFICANT CHANGE UP (ref 1.8–7.4)
IMM GRANULOCYTES NFR BLD AUTO: 0.2 % — SIGNIFICANT CHANGE UP (ref 0–0.9)
LYMPHOCYTES # BLD AUTO: 1.22 K/UL — SIGNIFICANT CHANGE UP (ref 1–3.3)
LYMPHOCYTES # BLD AUTO: 26.7 % — SIGNIFICANT CHANGE UP (ref 13–44)
MAGNESIUM SERPL-MCNC: 2 MG/DL — SIGNIFICANT CHANGE UP (ref 1.6–2.6)
MCHC RBC-ENTMCNC: 29.1 PG — SIGNIFICANT CHANGE UP (ref 27–34)
MCHC RBC-ENTMCNC: 32.5 GM/DL — SIGNIFICANT CHANGE UP (ref 32–36)
MCV RBC AUTO: 89.5 FL — SIGNIFICANT CHANGE UP (ref 80–100)
MONOCYTES # BLD AUTO: 0.61 K/UL — SIGNIFICANT CHANGE UP (ref 0–0.9)
MONOCYTES NFR BLD AUTO: 13.3 % — SIGNIFICANT CHANGE UP (ref 2–14)
NEUTROPHILS # BLD AUTO: 2.54 K/UL — SIGNIFICANT CHANGE UP (ref 1.8–7.4)
NEUTROPHILS NFR BLD AUTO: 55.7 % — SIGNIFICANT CHANGE UP (ref 43–77)
NRBC # BLD: 0 /100 WBCS — SIGNIFICANT CHANGE UP (ref 0–0)
NRBC # FLD: 0 K/UL — SIGNIFICANT CHANGE UP (ref 0–0)
PHOSPHATE SERPL-MCNC: 3.2 MG/DL — SIGNIFICANT CHANGE UP (ref 2.5–4.5)
PLATELET # BLD AUTO: 258 K/UL — SIGNIFICANT CHANGE UP (ref 150–400)
POTASSIUM SERPL-MCNC: 3.3 MMOL/L — LOW (ref 3.5–5.3)
POTASSIUM SERPL-SCNC: 3.3 MMOL/L — LOW (ref 3.5–5.3)
RBC # BLD: 4.4 M/UL — SIGNIFICANT CHANGE UP (ref 4.2–5.8)
RBC # FLD: 15.9 % — HIGH (ref 10.3–14.5)
SODIUM SERPL-SCNC: 142 MMOL/L — SIGNIFICANT CHANGE UP (ref 135–145)
WBC # BLD: 4.57 K/UL — SIGNIFICANT CHANGE UP (ref 3.8–10.5)
WBC # FLD AUTO: 4.57 K/UL — SIGNIFICANT CHANGE UP (ref 3.8–10.5)

## 2024-03-09 RX ORDER — POTASSIUM CHLORIDE 20 MEQ
40 PACKET (EA) ORAL EVERY 4 HOURS
Refills: 0 | Status: COMPLETED | OUTPATIENT
Start: 2024-03-09 | End: 2024-03-09

## 2024-03-09 RX ADMIN — Medication 25 MILLIGRAM(S): at 21:00

## 2024-03-09 RX ADMIN — Medication 200 MILLIGRAM(S): at 05:10

## 2024-03-09 RX ADMIN — Medication 40 MILLIEQUIVALENT(S): at 13:12

## 2024-03-09 RX ADMIN — Medication 40 MILLIEQUIVALENT(S): at 09:54

## 2024-03-09 RX ADMIN — LOSARTAN POTASSIUM 25 MILLIGRAM(S): 100 TABLET, FILM COATED ORAL at 05:10

## 2024-03-09 RX ADMIN — Medication 25 MILLIGRAM(S): at 05:10

## 2024-03-09 RX ADMIN — Medication 40 MILLIEQUIVALENT(S): at 17:07

## 2024-03-09 RX ADMIN — Medication 1: at 13:09

## 2024-03-09 RX ADMIN — Medication 25 MILLIGRAM(S): at 13:12

## 2024-03-09 NOTE — PROVIDER CONTACT NOTE (OTHER) - REASON
Pt had 17 beats NSVT
Pt had 21 beats
Pt had 5 beats NSVT
Pt had 31 beats of vtach on tele
Pt had 4 beats of v-tach as per tele tech
Pt had 4 beats on tele

## 2024-03-09 NOTE — PROVIDER CONTACT NOTE (OTHER) - SITUATION
Patient had 5 beats NSVT
Pt had 31 beats of vtach on tele
Patient had 17 beats of NSVT
Pt had 21 beats
Pt had 4 beats of v-tach as per tele tech
Pt had 4 beats of vtach on tele

## 2024-03-09 NOTE — PROVIDER CONTACT NOTE (OTHER) - ACTION/TREATMENT ORDERED:
ACP notified. Will continue to monitor until end of shift
ACP notified
ACP Jeni made aware, no interventions ordered at this time
ACP notified. Toprol 25mg ordered as one time dose. Will continue to monitor until end of shift
ACP Jeni made aware.  Tele monitoring ongoing
As per ACP Melissa Garcia. Transport patient to floor with zoll.

## 2024-03-09 NOTE — PROGRESS NOTE ADULT - SUBJECTIVE AND OBJECTIVE BOX
CARDIOLOGY FOLLOW UP - Dr. Zavala  Date of Service: 3/9/2024  CC: no new complaints    Review of Systems:  Constitutional: No fever, weight loss, or fatigue  Respiratory: No cough, wheezing, or hemoptysis, no shortness of breath  Cardiovascular: No chest pain, palpitations, passing out, dizziness, or leg swelling  Gastrointestinal: No abd or epigastric pain. No nausea, vomiting, or hematemesis; no diarrhea or consiptaiton, no melena or hematochezia  Vascular: No edema     TELEMETRY:    PHYSICAL EXAM:  T(C): 36.3 (03-09-24 @ 05:10), Max: 36.8 (03-08-24 @ 21:34)  HR: 82 (03-09-24 @ 05:10) (69 - 99)  BP: 143/96 (03-09-24 @ 05:10) (130/60 - 143/96)  RR: 18 (03-09-24 @ 05:10) (17 - 18)  SpO2: 97% (03-09-24 @ 05:10) (97% - 98%)  Wt(kg): --  I&O's Summary    08 Mar 2024 07:01  -  09 Mar 2024 07:00  --------------------------------------------------------  IN: 725 mL / OUT: 2360 mL / NET: -1635 mL        Appearance: Normal	  Cardiovascular: Normal S1 S2,RRR, No JVD, No murmurs  Respiratory: Lungs clear to auscultation	  Gastrointestinal:  Soft, Non-tender, + BS	  Extremities: Normal range of motion, No clubbing, cyanosis or edema  Vascular: Peripheral pulses palpable 2+ bilaterally       Home Medications:  METFORMIN HCL 1,000 MG TABLET: TAKE 1 TABLET BY MOUTH TWICE A DAY WITH MEALS (02 Mar 2024 20:59)  METOPROLOL SUCC ER 50 MG TAB: TAKE 1 TABLET BY MOUTH EVERY DAY (02 Mar 2024 20:59)        MEDICATIONS  (STANDING):  buMETAnide Infusion 0.5 mG/Hr (2.5 mL/Hr) IV Continuous <Continuous>  dextrose 5%. 1000 milliLiter(s) (100 mL/Hr) IV Continuous <Continuous>  dextrose 5%. 1000 milliLiter(s) (50 mL/Hr) IV Continuous <Continuous>  dextrose 50% Injectable 25 Gram(s) IV Push once  dextrose 50% Injectable 12.5 Gram(s) IV Push once  dextrose 50% Injectable 25 Gram(s) IV Push once  glucagon  Injectable 1 milliGRAM(s) IntraMuscular once  hydrALAZINE 25 milliGRAM(s) Oral three times a day  influenza   Vaccine 0.5 milliLiter(s) IntraMuscular once  insulin lispro (ADMELOG) corrective regimen sliding scale   SubCutaneous three times a day before meals  insulin lispro (ADMELOG) corrective regimen sliding scale   SubCutaneous at bedtime  losartan 25 milliGRAM(s) Oral daily  metoprolol succinate  milliGRAM(s) Oral daily  potassium chloride    Tablet ER 40 milliEquivalent(s) Oral every 4 hours        EKG:  RADIOLOGY:  DIAGNOSTIC TESTING:  [ ] Echocardiogram:  [ ] Catherterization:  [ ] Stress Test:  OTHER:     LABS:	 	                          12.8   4.57  )-----------( 258      ( 09 Mar 2024 06:00 )             39.4     03-09    142  |  99  |  29<H>  ----------------------------<  96  3.3<L>   |  28  |  1.19    Ca    8.7      09 Mar 2024 06:00  Phos  3.2     03-09  Mg     2.00     03-09            CARDIAC MARKERS:

## 2024-03-09 NOTE — PROVIDER CONTACT NOTE (OTHER) - ASSESSMENT
Patient A&Ox4, VSS, denies chest pain and shortness of breath at this time.  patient asymptomatic and resting comfortably in bed and in no acute distress at this time
Patient A&Ox4, denies chest pain, shortness of breath, discomfort at this time.  Patient in no acute distress at this time.  Patient previously had 4 beats NSVT while in the ED.
Pt is A&Ox4. Denies chest pain, palpations, headache, or shortness of breath.
Pt sleeping in bed, asymptomatic. VS stable no complaints of pain or discomfort
Pt asymptomatic at this time
Pt asymptomatic at this time

## 2024-03-09 NOTE — PROGRESS NOTE ADULT - ASSESSMENT
ECHO 3/4/23: EF 33% mild MR, Severe global left ventricular systolic dysfunction  cardiac cath from 3/6/23  Coronaries with minor luminal irregularities. Elevated LVEDP.     A/P  63 yr old male with a pmh of HTN, HF rEF ( EF33%), T2DM on metformin who presents with ROSS and bilateral lower extremity edema with his legs feeling heavy.     #Acute on chronic HFrEF  -cont bumex gtt at 0.5mg/hr  -Not compliant with home meds (lasix 40mg daily, metoprolol XL 50mg daily, hydralazine 25mg TID)  -repeat echo this admission with ef 27%   Left ventricular systolic function is severely decreased  -Prior cardiac cath from 3/6/23  Coronaries with minor luminal irregularities. Elevated LVEDP.   -strict I/O, monitor daily weights, fluid restriction   -less NSVT   -c/w toprol     -eps eval for NSVT, possible  ICD/CRTD for cmp, HF- per EP In light of his noncompliance with optimal medical therapy, he is not a candidate for ICD   -will reassess in 3 months   -PE ACP insurance does not cover entresto -- plan to receive last dose tonight-- start cozaar tomorrow AM    -suppl K     #syncope, loc  -depressed ed, ivcd, nsvt on tele, up to 31 beats  -eps eval noted   -ct head unremarkable     #Hypertensive urgency.   -bp improved   -continue hydralazine, BB, entresto, lasix      #T2DM (type 2 diabetes mellitus).   -med f/u     #HLD  -cont statin        dvt ppx    35 minutes spent on total encounter; more than 50% of the visit was spent counseling and/or coordinating care by the attending physician.

## 2024-03-09 NOTE — PROVIDER CONTACT NOTE (OTHER) - BACKGROUND
Pt admitted for heart failure
(PMH) HFrEF (heart failure with reduced ejection fraction)  (PMH) HTN (hypertension)  (PMH) HLD (hyperlipidemia)
62 yo male htn, chf, DM admitted with heart failure
(Admit Diagnosis) Heart failure  (PMH) HFrEF (heart failure with reduced ejection fraction)  (PMH) HTN (hypertension)
62 yo male hx HTN, CHF, DM, admitted with heart failure.
(Admit Diagnosis) Heart failure  (PMH) HFrEF (heart failure with reduced ejection fraction)  (PMH) HTN (hypertension)  (PMH) HLD (hyperlipidemia)  (PMH) DM (diabetes mellitus)  (

## 2024-03-09 NOTE — PROVIDER CONTACT NOTE (OTHER) - DATE AND TIME:
[facility] AT [facility] [address]  [address] [facility] [place] [address] [id number]  Dept: [phone]  Dept Fax: [phone]         March 15, 2020    Patient: Judah Oliveira   YOB: 2020   Date of Visit: 3/15/2020       To Whom It
03-Mar-2024 06:32
03-Mar-2024 09:15
09-Mar-2024 21:50
02-Mar-2024 21:34
03-Mar-2024 00:06
03-Mar-2024 11:48

## 2024-03-10 LAB
ANION GAP SERPL CALC-SCNC: 12 MMOL/L — SIGNIFICANT CHANGE UP (ref 7–14)
BASOPHILS # BLD AUTO: 0.02 K/UL — SIGNIFICANT CHANGE UP (ref 0–0.2)
BASOPHILS NFR BLD AUTO: 0.4 % — SIGNIFICANT CHANGE UP (ref 0–2)
BUN SERPL-MCNC: 29 MG/DL — HIGH (ref 7–23)
CALCIUM SERPL-MCNC: 8.6 MG/DL — SIGNIFICANT CHANGE UP (ref 8.4–10.5)
CHLORIDE SERPL-SCNC: 101 MMOL/L — SIGNIFICANT CHANGE UP (ref 98–107)
CO2 SERPL-SCNC: 29 MMOL/L — SIGNIFICANT CHANGE UP (ref 22–31)
CREAT SERPL-MCNC: 1.23 MG/DL — SIGNIFICANT CHANGE UP (ref 0.5–1.3)
EGFR: 66 ML/MIN/1.73M2 — SIGNIFICANT CHANGE UP
EOSINOPHIL # BLD AUTO: 0.13 K/UL — SIGNIFICANT CHANGE UP (ref 0–0.5)
EOSINOPHIL NFR BLD AUTO: 2.7 % — SIGNIFICANT CHANGE UP (ref 0–6)
GLUCOSE BLDC GLUCOMTR-MCNC: 137 MG/DL — HIGH (ref 70–99)
GLUCOSE BLDC GLUCOMTR-MCNC: 141 MG/DL — HIGH (ref 70–99)
GLUCOSE BLDC GLUCOMTR-MCNC: 157 MG/DL — HIGH (ref 70–99)
GLUCOSE BLDC GLUCOMTR-MCNC: 164 MG/DL — HIGH (ref 70–99)
GLUCOSE SERPL-MCNC: 147 MG/DL — HIGH (ref 70–99)
HCT VFR BLD CALC: 40.5 % — SIGNIFICANT CHANGE UP (ref 39–50)
HGB BLD-MCNC: 12.6 G/DL — LOW (ref 13–17)
IANC: 2.61 K/UL — SIGNIFICANT CHANGE UP (ref 1.8–7.4)
IMM GRANULOCYTES NFR BLD AUTO: 0.2 % — SIGNIFICANT CHANGE UP (ref 0–0.9)
LYMPHOCYTES # BLD AUTO: 1.35 K/UL — SIGNIFICANT CHANGE UP (ref 1–3.3)
LYMPHOCYTES # BLD AUTO: 28.1 % — SIGNIFICANT CHANGE UP (ref 13–44)
MAGNESIUM SERPL-MCNC: 1.9 MG/DL — SIGNIFICANT CHANGE UP (ref 1.6–2.6)
MCHC RBC-ENTMCNC: 28.8 PG — SIGNIFICANT CHANGE UP (ref 27–34)
MCHC RBC-ENTMCNC: 31.1 GM/DL — LOW (ref 32–36)
MCV RBC AUTO: 92.5 FL — SIGNIFICANT CHANGE UP (ref 80–100)
MONOCYTES # BLD AUTO: 0.69 K/UL — SIGNIFICANT CHANGE UP (ref 0–0.9)
MONOCYTES NFR BLD AUTO: 14.3 % — HIGH (ref 2–14)
NEUTROPHILS # BLD AUTO: 2.61 K/UL — SIGNIFICANT CHANGE UP (ref 1.8–7.4)
NEUTROPHILS NFR BLD AUTO: 54.3 % — SIGNIFICANT CHANGE UP (ref 43–77)
NRBC # BLD: 0 /100 WBCS — SIGNIFICANT CHANGE UP (ref 0–0)
NRBC # FLD: 0 K/UL — SIGNIFICANT CHANGE UP (ref 0–0)
PHOSPHATE SERPL-MCNC: 3 MG/DL — SIGNIFICANT CHANGE UP (ref 2.5–4.5)
PLATELET # BLD AUTO: 261 K/UL — SIGNIFICANT CHANGE UP (ref 150–400)
POTASSIUM SERPL-MCNC: 3.9 MMOL/L — SIGNIFICANT CHANGE UP (ref 3.5–5.3)
POTASSIUM SERPL-SCNC: 3.9 MMOL/L — SIGNIFICANT CHANGE UP (ref 3.5–5.3)
RBC # BLD: 4.38 M/UL — SIGNIFICANT CHANGE UP (ref 4.2–5.8)
RBC # FLD: 15.5 % — HIGH (ref 10.3–14.5)
SODIUM SERPL-SCNC: 142 MMOL/L — SIGNIFICANT CHANGE UP (ref 135–145)
WBC # BLD: 4.81 K/UL — SIGNIFICANT CHANGE UP (ref 3.8–10.5)
WBC # FLD AUTO: 4.81 K/UL — SIGNIFICANT CHANGE UP (ref 3.8–10.5)

## 2024-03-10 RX ORDER — METOPROLOL TARTRATE 50 MG
300 TABLET ORAL DAILY
Refills: 0 | Status: DISCONTINUED | OUTPATIENT
Start: 2024-03-11 | End: 2024-03-28

## 2024-03-10 RX ADMIN — Medication 25 MILLIGRAM(S): at 12:09

## 2024-03-10 RX ADMIN — Medication 200 MILLIGRAM(S): at 05:26

## 2024-03-10 RX ADMIN — BUMETANIDE 2.5 MG/HR: 0.25 INJECTION INTRAMUSCULAR; INTRAVENOUS at 05:28

## 2024-03-10 RX ADMIN — Medication 25 MILLIGRAM(S): at 05:26

## 2024-03-10 RX ADMIN — Medication 25 MILLIGRAM(S): at 21:35

## 2024-03-10 RX ADMIN — LOSARTAN POTASSIUM 25 MILLIGRAM(S): 100 TABLET, FILM COATED ORAL at 05:26

## 2024-03-10 RX ADMIN — Medication 1: at 12:47

## 2024-03-10 NOTE — DIETITIAN INITIAL EVALUATION ADULT - PROBLEM SELECTOR PLAN 2
New  /130-> 148/106  Continue hydralazine 25mg TID, metoprolol XL 50mg daily and lasix 40mg IV daily   Monitor

## 2024-03-10 NOTE — PROGRESS NOTE ADULT - ASSESSMENT
A/P  63 yr old male with a pmh of HTN, HF rEF ( EF33%), T2DM on metformin who presents with ROSS and bilateral lower extremity edema with his legs feeling heavy.     #Acute on chronic systolic heart Failure : due to non complaince with meds : d/w pt and niece need to adhere with meds and need for AICD after re-evaluation of compliance   cont diuresis   pn bumex at this time    -started on entresto   c/w coreg   Cardio following   EF <35%        #syncope  #NSVT on tele   -EP cardio consulted : apprecaite input : In light of his noncompliance with optimal medical therapy, he is not a candidate for ICD for primary prevention at this time despite telemetry evidence of NSVT and LVEF 33%. . Our recommendation would be to continue aggressive diuresis while continuing GDMT for a duration of 3 months then reevaluate his LVEF. His ectopy burden will likely decrease when patient no longer in decompensated heart failure.   Keep K+ > 4.0.   K+ 3.4 today.  Please replete.  Increased beta blocker for NSVT.      tele monitoring    #Hypertensive   -BP improved   -continue hydralazine 25mg TID, metoprolol XL 50mg daily and lasix   - entresto added    #DM-2   a1c 7.1  c/w Insulin / FSBS    #HLD  -cont statin         FULL CODE

## 2024-03-10 NOTE — DIETITIAN INITIAL EVALUATION ADULT - PERTINENT LABORATORY DATA
03-10    142  |  101  |  29<H>  ----------------------------<  147<H>  3.9   |  29  |  1.23    Ca    8.6      10 Mar 2024 03:47  Phos  3.0     03-10  Mg     1.90     03-10    POCT Blood Glucose.: 164 mg/dL (03-10-24 @ 12:37)  A1C with Estimated Average Glucose Result: 7.1 % (03-03-24 @ 06:47)

## 2024-03-10 NOTE — PROGRESS NOTE ADULT - SUBJECTIVE AND OBJECTIVE BOX
Date of service: 03-10-24 @ 14:31      Patient is a 63y old  Male who presents with a chief complaint of Acute decompensated heart failure (10 Mar 2024 10:41)                                                               INTERVAL HPI/OVERNIGHT EVENTS:    REVIEW OF SYSTEMS:     CONSTITUTIONAL: No weakness, fevers or chills  EYES/ENT: No visual changes , no ear ache   NECK: No pain or stiffness  RESPIRATORY: No cough, wheezing,  No shortness of breath  CARDIOVASCULAR: No chest pain or palpitations  GASTROINTESTINAL: No abdominal pain  . No nausea, vomiting, or hematemesis; No diarrhea or constipation. No melena or hematochezia.  GENITOURINARY: No dysuria, frequency or hematuria  NEUROLOGICAL: No numbness or weakness  SKIN: No itching, burning, rashes, or lesions                                                                                                                                                                                                                                                                                 Medications:  MEDICATIONS  (STANDING):  buMETAnide Infusion 0.5 mG/Hr (2.5 mL/Hr) IV Continuous <Continuous>  dextrose 5%. 1000 milliLiter(s) (100 mL/Hr) IV Continuous <Continuous>  dextrose 5%. 1000 milliLiter(s) (50 mL/Hr) IV Continuous <Continuous>  dextrose 50% Injectable 25 Gram(s) IV Push once  dextrose 50% Injectable 12.5 Gram(s) IV Push once  dextrose 50% Injectable 25 Gram(s) IV Push once  glucagon  Injectable 1 milliGRAM(s) IntraMuscular once  hydrALAZINE 25 milliGRAM(s) Oral three times a day  influenza   Vaccine 0.5 milliLiter(s) IntraMuscular once  insulin lispro (ADMELOG) corrective regimen sliding scale   SubCutaneous three times a day before meals  insulin lispro (ADMELOG) corrective regimen sliding scale   SubCutaneous at bedtime  losartan 25 milliGRAM(s) Oral daily    MEDICATIONS  (PRN):  acetaminophen     Tablet .. 650 milliGRAM(s) Oral every 6 hours PRN Temp greater or equal to 38C (100.4F), Mild Pain (1 - 3)  aluminum hydroxide/magnesium hydroxide/simethicone Suspension 30 milliLiter(s) Oral every 4 hours PRN Dyspepsia  dextrose Oral Gel 15 Gram(s) Oral once PRN Blood Glucose LESS THAN 70 milliGRAM(s)/deciliter  melatonin 3 milliGRAM(s) Oral at bedtime PRN Insomnia  ondansetron Injectable 4 milliGRAM(s) IV Push every 8 hours PRN Nausea and/or Vomiting       Allergies    No Known Allergies    Intolerances      Vital Signs Last 24 Hrs  T(C): 36.4 (10 Mar 2024 11:48), Max: 36.7 (09 Mar 2024 17:33)  T(F): 97.6 (10 Mar 2024 11:48), Max: 98 (09 Mar 2024 17:33)  HR: 87 (10 Mar 2024 11:48) (77 - 98)  BP: 126/82 (10 Mar 2024 11:48) (126/82 - 147/93)  BP(mean): --  RR: 17 (10 Mar 2024 11:48) (16 - 17)  SpO2: 98% (10 Mar 2024 11:48) (98% - 100%)    Parameters below as of 10 Mar 2024 11:48  Patient On (Oxygen Delivery Method): room air      CAPILLARY BLOOD GLUCOSE      POCT Blood Glucose.: 164 mg/dL (10 Mar 2024 12:37)  POCT Blood Glucose.: 137 mg/dL (10 Mar 2024 08:41)  POCT Blood Glucose.: 158 mg/dL (09 Mar 2024 21:31)  POCT Blood Glucose.: 129 mg/dL (09 Mar 2024 16:54)       @ 06:01  -  03-10 @ 07:00  --------------------------------------------------------  IN: 1330 mL / OUT: 4690 mL / NET: -3360 mL      Physical Exam:    Daily     Daily Weight in k.7 (10 Mar 2024 05:20)  General:  Well appearing, NAD, not cachetic  HEENT:  Nonicteric, PERRLA  CV:  RRR, S1S2   Lungs:  CTA B/L, no wheezes, rales, rhonchi  Abdomen:  Soft, non-tender, no distended, positive BS  Extremities:  analilia a    Neuro:  AAOx3, non-focal, grossly intact                                                                                                                                                                                                                                                                                                LABS:                               12.6   4.81  )-----------( 261      ( 10 Mar 2024 03:47 )             40.5                      03-10    142  |  101  |  29<H>  ----------------------------<  147<H>  3.9   |  29  |  1.23    Ca    8.6      10 Mar 2024 03:47  Phos  3.0     03-10  Mg     1.90     03-10                         RADIOLOGY & ADDITIONAL TESTS         I personally reviewed: [  ]EKG   [  ]CXR    [  ] CT      A/P:         Discussed with :     Antonio consultants' Notes   Time spent :

## 2024-03-10 NOTE — DIETITIAN INITIAL EVALUATION ADULT - ORAL INTAKE PTA/DIET HISTORY
Patient endorses no known food allergies or intolerance.  Patient has not been following consistent carb diet PTA.  Writer explained diet order and fluid restriction order due to current medical condition ADHF.  Patient reports good appetite in house, requests two bowls of oatmeal.

## 2024-03-10 NOTE — DIETITIAN INITIAL EVALUATION ADULT - ADD RECOMMEND
no 1. Please encourage dietary compliance  2. Nursing to document PO in RN flow sheets and monitor weekly weights.   3. Continue to monitor skin integrity, bowel regimen, and nutrition pertinent labs.

## 2024-03-10 NOTE — PROGRESS NOTE ADULT - SUBJECTIVE AND OBJECTIVE BOX
CARDIOLOGY FOLLOW UP - Dr. Zavala  Date of Service: 3/10/2024  CC: no new complaints    Review of Systems:  Constitutional: No fever, weight loss, or fatigue  Respiratory: No cough, wheezing, or hemoptysis, no shortness of breath  Cardiovascular: No chest pain, palpitations, passing out, dizziness, or leg swelling  Gastrointestinal: No abd or epigastric pain. No nausea, vomiting, or hematemesis; no diarrhea or consiptaiton, no melena or hematochezia  Vascular: No edema     TELEMETRY:    PHYSICAL EXAM:  T(C): 36.4 (03-10-24 @ 05:20), Max: 36.7 (03-09-24 @ 17:33)  HR: 98 (03-10-24 @ 05:20) (77 - 98)  BP: 137/98 (03-10-24 @ 05:20) (134/89 - 147/93)  RR: 16 (03-10-24 @ 05:20) (16 - 16)  SpO2: 100% (03-10-24 @ 05:20) (100% - 100%)  Wt(kg): --  I&O's Summary    09 Mar 2024 06:01  -  10 Mar 2024 07:00  --------------------------------------------------------  IN: 1330 mL / OUT: 4690 mL / NET: -3360 mL        Appearance: Normal	  Cardiovascular: Normal S1 S2,RRR, No JVD, No murmurs  Respiratory: Lungs clear to auscultation	  Gastrointestinal:  Soft, Non-tender, + BS	  Extremities: Normal range of motion, No clubbing, cyanosis or edema  Vascular: Peripheral pulses palpable 2+ bilaterally       Home Medications:  METFORMIN HCL 1,000 MG TABLET: TAKE 1 TABLET BY MOUTH TWICE A DAY WITH MEALS (02 Mar 2024 20:59)  METOPROLOL SUCC ER 50 MG TAB: TAKE 1 TABLET BY MOUTH EVERY DAY (02 Mar 2024 20:59)        MEDICATIONS  (STANDING):  buMETAnide Infusion 0.5 mG/Hr (2.5 mL/Hr) IV Continuous <Continuous>  dextrose 5%. 1000 milliLiter(s) (100 mL/Hr) IV Continuous <Continuous>  dextrose 5%. 1000 milliLiter(s) (50 mL/Hr) IV Continuous <Continuous>  dextrose 50% Injectable 25 Gram(s) IV Push once  dextrose 50% Injectable 12.5 Gram(s) IV Push once  dextrose 50% Injectable 25 Gram(s) IV Push once  glucagon  Injectable 1 milliGRAM(s) IntraMuscular once  hydrALAZINE 25 milliGRAM(s) Oral three times a day  influenza   Vaccine 0.5 milliLiter(s) IntraMuscular once  insulin lispro (ADMELOG) corrective regimen sliding scale   SubCutaneous three times a day before meals  insulin lispro (ADMELOG) corrective regimen sliding scale   SubCutaneous at bedtime  losartan 25 milliGRAM(s) Oral daily  metoprolol succinate  milliGRAM(s) Oral daily        EKG:  RADIOLOGY:  DIAGNOSTIC TESTING:  [ ] Echocardiogram:  [ ] Catherterization:  [ ] Stress Test:  OTHER:     LABS:	 	                          12.6   4.81  )-----------( 261      ( 10 Mar 2024 03:47 )             40.5     03-10    142  |  101  |  29<H>  ----------------------------<  147<H>  3.9   |  29  |  1.23    Ca    8.6      10 Mar 2024 03:47  Phos  3.0     03-10  Mg     1.90     03-10            CARDIAC MARKERS:

## 2024-03-10 NOTE — DIETITIAN INITIAL EVALUATION ADULT - PERTINENT MEDS FT
MEDICATIONS  (STANDING):  buMETAnide Infusion 0.5 mG/Hr (2.5 mL/Hr) IV Continuous <Continuous>  dextrose 5%. 1000 milliLiter(s) (100 mL/Hr) IV Continuous <Continuous>  dextrose 5%. 1000 milliLiter(s) (50 mL/Hr) IV Continuous <Continuous>  dextrose 50% Injectable 25 Gram(s) IV Push once  dextrose 50% Injectable 12.5 Gram(s) IV Push once  dextrose 50% Injectable 25 Gram(s) IV Push once  glucagon  Injectable 1 milliGRAM(s) IntraMuscular once  hydrALAZINE 25 milliGRAM(s) Oral three times a day  influenza   Vaccine 0.5 milliLiter(s) IntraMuscular once  insulin lispro (ADMELOG) corrective regimen sliding scale   SubCutaneous three times a day before meals  insulin lispro (ADMELOG) corrective regimen sliding scale   SubCutaneous at bedtime  losartan 25 milliGRAM(s) Oral daily    MEDICATIONS  (PRN):  acetaminophen     Tablet .. 650 milliGRAM(s) Oral every 6 hours PRN Temp greater or equal to 38C (100.4F), Mild Pain (1 - 3)  aluminum hydroxide/magnesium hydroxide/simethicone Suspension 30 milliLiter(s) Oral every 4 hours PRN Dyspepsia  dextrose Oral Gel 15 Gram(s) Oral once PRN Blood Glucose LESS THAN 70 milliGRAM(s)/deciliter  melatonin 3 milliGRAM(s) Oral at bedtime PRN Insomnia  ondansetron Injectable 4 milliGRAM(s) IV Push every 8 hours PRN Nausea and/or Vomiting

## 2024-03-10 NOTE — PROGRESS NOTE ADULT - ASSESSMENT
ECHO 3/4/23: EF 33% mild MR, Severe global left ventricular systolic dysfunction  cardiac cath from 3/6/23  Coronaries with minor luminal irregularities. Elevated LVEDP.     A/P  63 yr old male with a pmh of HTN, HF rEF ( EF33%), T2DM on metformin who presents with ROSS and bilateral lower extremity edema with his legs feeling heavy.     #Acute on chronic HFrEF  -cont bumex gtt at 0.5mg/hr  -Not compliant with home meds (lasix 40mg daily, metoprolol XL 50mg daily, hydralazine 25mg TID)  -repeat echo this admission with ef 27%   Left ventricular systolic function is severely decreased  -Prior cardiac cath from 3/6/23  Coronaries with minor luminal irregularities. Elevated LVEDP.   -strict I/O, monitor daily weights, fluid restriction   -still w some NSVT   -increase toprol  to 300mg PO daily  -eps eval for NSVT, possible  ICD/CRTD for cmp, HF- per EP In light of his noncompliance with optimal medical therapy, he is not a candidate for ICD   -will reassess in 3 months   -[employer] insurance does not cover entresto -- plan to receive last dose tonight-- start cozaar tomorrow AM    -suppl K     #syncope, loc  -depressed ed, ivcd, nsvt on tele, up to 31 beats  -eps eval noted   -ct head unremarkable     #Hypertensive urgency.   -bp improved   -continue hydralazine, BB, entresto, lasix      #T2DM (type 2 diabetes mellitus).   -med f/u     #HLD  -cont statin        dvt ppx    35 minutes spent on total encounter; more than 50% of the visit was spent counseling and/or coordinating care by the attending physician.

## 2024-03-10 NOTE — DIETITIAN INITIAL EVALUATION ADULT - REASON FOR ADMISSION
Heart failure.  Per 3/10/24 internal medicine chart review, Patient is a 63 yr old male with a pmh of HTN, HF rEF ( EF33%), T2DM on metformin who presents with ROSS and bilateral lower extremity edema with his legs feeling heavy.

## 2024-03-10 NOTE — DIETITIAN INITIAL EVALUATION ADULT - PROBLEM SELECTOR PLAN 1
New  ROSS + bilateral pitting edema, Congestion on CXR, proBNP [id number]  - TTE 3/2023: EF 33%  - EKG ordered  - Not compliant  with home regimen of lasix 40mg daily, metoprolol XL 50mg daily, hydralazine 25mg TID (Does not appear to be on GDMT)  * echo ordered  * s/p lasix 40mg IV x1- will order another dose tonight, from tomorrow lasix 40mg IV daily   * strict I/O, monitor daily weights, fluid restriction     * holding aspirin 81mg daily pending CT head as pt reported 2 episodes where he hit his head and had LOC  * Cardiology to see pt tomorrow as admitted under Dr. Zavala

## 2024-03-10 NOTE — DIETITIAN INITIAL EVALUATION ADULT - OTHER INFO
Patient is A&O x3 at baseline.  Patient is tolerating a diet order of CSTCHOSN, DASH/TLC, and 1000ml fluid restriction.  Pt reports adequate PO intake with good appetite.  Denies difficulty chewing or swallowing.  No active GI distress such as nausea, vomit, diarrhea, constipation; on ondansetron PRN.  Pt reported last BM 3/10/24.  Skin noted 2+ generalized edema and 3 + bilateral legs, no pressure injury per RN flowsheets.    .4kg (3-2), height 175.3cm, BMI 36.6-obesity.  Patient is on daily weights, noted a planned diuresis in house with weight trending down from 112.4kg to 106.7kg per RN flowsheet.  Patient has an unplanned 15.4 kg/33.8 lbs/ 15.8% weight gain in 1 year 2/2 edema and fluid retention s/p not taking meds per [facility] weight history @ 97kg (3-6-23).  Labs 3/10 reviewed with elevated BUN 29.  Continue on CSTCHOSN diet, POCT  (3/7-3/10), on insulin regimen with fair-good glycemic control A1c@ 7.1(3-3-24).  Patient has little interest for DM education when RD offered.  Fluid restriction handout left at bedside.  RD to remain available.

## 2024-03-10 NOTE — DIETITIAN INITIAL EVALUATION ADULT - NS FNS DIET ORDER
Diet, Regular:   Consistent Carbohydrate {Evening Snack} (CSTCHOSN)  DASH/TLC {Sodium & Cholesterol Restricted} (DASH)  1000mL Fluid Restriction ([id number]) (03-02-24 @ 19:12)

## 2024-03-11 LAB
ANION GAP SERPL CALC-SCNC: 14 MMOL/L — SIGNIFICANT CHANGE UP (ref 7–14)
BASOPHILS # BLD AUTO: 0.01 K/UL — SIGNIFICANT CHANGE UP (ref 0–0.2)
BASOPHILS NFR BLD AUTO: 0.2 % — SIGNIFICANT CHANGE UP (ref 0–2)
BUN SERPL-MCNC: 31 MG/DL — HIGH (ref 7–23)
CALCIUM SERPL-MCNC: 8.7 MG/DL — SIGNIFICANT CHANGE UP (ref 8.4–10.5)
CHLORIDE SERPL-SCNC: 98 MMOL/L — SIGNIFICANT CHANGE UP (ref 98–107)
CO2 SERPL-SCNC: 30 MMOL/L — SIGNIFICANT CHANGE UP (ref 22–31)
CREAT SERPL-MCNC: 1.17 MG/DL — SIGNIFICANT CHANGE UP (ref 0.5–1.3)
EGFR: 70 ML/MIN/1.73M2 — SIGNIFICANT CHANGE UP
EOSINOPHIL # BLD AUTO: 0.07 K/UL — SIGNIFICANT CHANGE UP (ref 0–0.5)
EOSINOPHIL NFR BLD AUTO: 1.4 % — SIGNIFICANT CHANGE UP (ref 0–6)
GLUCOSE BLDC GLUCOMTR-MCNC: 112 MG/DL — HIGH (ref 70–99)
GLUCOSE BLDC GLUCOMTR-MCNC: 138 MG/DL — HIGH (ref 70–99)
GLUCOSE BLDC GLUCOMTR-MCNC: 143 MG/DL — HIGH (ref 70–99)
GLUCOSE BLDC GLUCOMTR-MCNC: 159 MG/DL — HIGH (ref 70–99)
GLUCOSE SERPL-MCNC: 126 MG/DL — HIGH (ref 70–99)
HCT VFR BLD CALC: 37.8 % — LOW (ref 39–50)
HGB BLD-MCNC: 12.1 G/DL — LOW (ref 13–17)
IANC: 2.67 K/UL — SIGNIFICANT CHANGE UP (ref 1.8–7.4)
IMM GRANULOCYTES NFR BLD AUTO: 0.2 % — SIGNIFICANT CHANGE UP (ref 0–0.9)
LYMPHOCYTES # BLD AUTO: 1.34 K/UL — SIGNIFICANT CHANGE UP (ref 1–3.3)
LYMPHOCYTES # BLD AUTO: 27.1 % — SIGNIFICANT CHANGE UP (ref 13–44)
MAGNESIUM SERPL-MCNC: 1.8 MG/DL — SIGNIFICANT CHANGE UP (ref 1.6–2.6)
MCHC RBC-ENTMCNC: 29.4 PG — SIGNIFICANT CHANGE UP (ref 27–34)
MCHC RBC-ENTMCNC: 32 GM/DL — SIGNIFICANT CHANGE UP (ref 32–36)
MCV RBC AUTO: 92 FL — SIGNIFICANT CHANGE UP (ref 80–100)
MONOCYTES # BLD AUTO: 0.85 K/UL — SIGNIFICANT CHANGE UP (ref 0–0.9)
MONOCYTES NFR BLD AUTO: 17.2 % — HIGH (ref 2–14)
NEUTROPHILS # BLD AUTO: 2.67 K/UL — SIGNIFICANT CHANGE UP (ref 1.8–7.4)
NEUTROPHILS NFR BLD AUTO: 53.9 % — SIGNIFICANT CHANGE UP (ref 43–77)
NRBC # BLD: 0 /100 WBCS — SIGNIFICANT CHANGE UP (ref 0–0)
NRBC # FLD: 0 K/UL — SIGNIFICANT CHANGE UP (ref 0–0)
PHOSPHATE SERPL-MCNC: 3 MG/DL — SIGNIFICANT CHANGE UP (ref 2.5–4.5)
PLATELET # BLD AUTO: 252 K/UL — SIGNIFICANT CHANGE UP (ref 150–400)
POTASSIUM SERPL-MCNC: 3.5 MMOL/L — SIGNIFICANT CHANGE UP (ref 3.5–5.3)
POTASSIUM SERPL-SCNC: 3.5 MMOL/L — SIGNIFICANT CHANGE UP (ref 3.5–5.3)
RBC # BLD: 4.11 M/UL — LOW (ref 4.2–5.8)
RBC # FLD: 15.6 % — HIGH (ref 10.3–14.5)
SODIUM SERPL-SCNC: 142 MMOL/L — SIGNIFICANT CHANGE UP (ref 135–145)
WBC # BLD: 4.95 K/UL — SIGNIFICANT CHANGE UP (ref 3.8–10.5)
WBC # FLD AUTO: 4.95 K/UL — SIGNIFICANT CHANGE UP (ref 3.8–10.5)

## 2024-03-11 RX ADMIN — Medication 25 MILLIGRAM(S): at 12:30

## 2024-03-11 RX ADMIN — Medication 1: at 17:33

## 2024-03-11 RX ADMIN — Medication 300 MILLIGRAM(S): at 05:17

## 2024-03-11 RX ADMIN — Medication 25 MILLIGRAM(S): at 22:01

## 2024-03-11 RX ADMIN — LOSARTAN POTASSIUM 25 MILLIGRAM(S): 100 TABLET, FILM COATED ORAL at 05:17

## 2024-03-11 RX ADMIN — Medication 25 MILLIGRAM(S): at 05:17

## 2024-03-11 NOTE — PROGRESS NOTE ADULT - ASSESSMENT
63 yr old male with a pmh of HTN, HF rEF ( EF33%), T2DM on metformin who presents with ROSS and bilateral lower extremity edema with his legs feeling heavy.     #Acute on chronic systolic heart Failure : due to non complaince with meds : d/w pt and niece need to adhere with meds and need for AICD after re-evaluation of compliance   cont diuresis   on bumext GTT   -started on entresto : not covered by insurance , will be switched to cozaar from am   c/w Toprol  mg daily   Cardio following   EF <35%        #syncope  #NSVT on tele   -EP cardio consulted : apprecaite input : In light of his noncompliance with optimal medical therapy, he is not a candidate for ICD for primary prevention at this time despite telemetry evidence of NSVT and LVEF 33%. . Our recommendation would be to continue aggressive diuresis while continuing GDMT for a duration of 3 months then reevaluate his LVEF. His ectopy burden will likely decrease when patient no longer in decompensated heart failure.   Keep K+ > 4.0.   K+ 3.4 today.  Please replete.  on Toprol  mg daily      tele monitoring    #Hypertensive   -BP improved   c/w present meds     #DM-2   a1c 7.1  c/w Insulin / FSBS    #HLD  -cont statin    dispo: plan for starting on lantus 8 units sq q HS , c/w FSBS with RICss   insulin teaching

## 2024-03-11 NOTE — PROGRESS NOTE ADULT - SUBJECTIVE AND OBJECTIVE BOX
Events: Patient seen and examined. Denies CP, SOB, dizziness, LH, near syncope or sycope.   No acute events overnight. Patient found resting in bed.   Telemetry : - SR 80s   O:  T(C): 36.6 (24 @ 05:10), Max: 36.8 (03-10-24 @ 20:07)  HR: 89 (24 @ 05:10) (86 - 90)  BP: 139/95 (24 @ 05:10) (129/93 - 139/95)  RR: 17 (24 @ 05:10) (17 - 17)  SpO2: 100% (24 @ 05:10) (97% - 100%)        Daily Weight in k.8 (11 Mar 2024 05:10)  Gen: NAD  HEENT: EOMI  CV: RRR, normal S1 + S2, no m/r/g  Lungs: CTAB  Abd: soft, non-tender  Ext: 2++ BL- LE    Labs:                        12.1   4.95  )-----------( 252      ( 11 Mar 2024 05:40 )             37.8         142  |  98  |  31<H>  ----------------------------<  126<H>  3.5   |  30  |  1.17    Ca    8.7      11 Mar 2024 05:40  Phos  3.0       Mg     1.80          DIagnostics : tte< from: TTE Limited W or WO Ultrasound Enhancing Agent (24 @ 12:18) >     CONCLUSIONS:      1. Left ventricular cavity is mildly dilated. Left ventricular systolic function is severely decreased with an ejection fraction of 27 % by Suero's method of disks.   2. Enlarged right ventricular cavity size and reduced systolic function. Tricuspid annular plane systolic excursion (TAPSE) is 0.6 cm (normal >=1.7 cm).   3. The left atrium is moderately dilated.   4. Structurally normal mitral valve with normal leaflet excursion.   5. Symmetric mitral valve leaflet tethering.   6. Mild mitral regurgitation.   7. Estimated pulmonary artery systolic pressure is 47 mmHg.   8. The inferior venacava is dilated (dilated >2.1cm) with abnormal inspiratory collapse (abnormal <50%) consistent with elevated right atrial pressure (~15, range 10-20mmHg).   9. Bilateral pleural effusion noted.    < end of copied text >  ------------------------------------------  CATH 3/2023  Diagnostic Conclusions:   Coronaries with minor luminal irregularities. Elevated LVEDP.     Recommendations:   Optimize medical therapy for nonischemic cardiomyopathy.     Diagnostic Findings:     Coronary Angiography   The coronary circulation is right dominant. Cardiac catheterization  was performed electively.    LM   Leftmain artery: Normal.      LAD   Left anterior descending artery: Angiography shows minor  irregularities.    CX   Circumflex: Angiography shows minor irregularities.      RCA   Right coronary artery: Angiography shows minor irregularities.      Left Heart Cath   Left ventricular function was not assessed.              MEDICATIONS  (STANDING):  buMETAnide Infusion 0.5 mG/Hr (2.5 mL/Hr) IV Continuous <Continuous>  dextrose 5%. 1000 milliLiter(s) (100 mL/Hr) IV Continuous <Continuous>  dextrose 5%. 1000 milliLiter(s) (50 mL/Hr) IV Continuous <Continuous>  dextrose 50% Injectable 25 Gram(s) IV Push once  dextrose 50% Injectable 25 Gram(s) IV Push once  dextrose 50% Injectable 12.5 Gram(s) IV Push once  glucagon  Injectable 1 milliGRAM(s) IntraMuscular once  hydrALAZINE 25 milliGRAM(s) Oral three times a day  influenza   Vaccine 0.5 milliLiter(s) IntraMuscular once  insulin lispro (ADMELOG) corrective regimen sliding scale   SubCutaneous at bedtime  insulin lispro (ADMELOG) corrective regimen sliding scale   SubCutaneous three times a day before meals  losartan 25 milliGRAM(s) Oral daily  metolazone 2.5 milliGRAM(s) Oral once  metoprolol succinate  milliGRAM(s) Oral daily

## 2024-03-11 NOTE — PROGRESS NOTE ADULT - SUBJECTIVE AND OBJECTIVE BOX
CARDIOLOGY FOLLOW UP - Dr. Zavala  DATE OF SERVICE: 3/11/24    CC  No CV complaints     REVIEW OF SYSTEMS:  CONSTITUTIONAL: No fever, weight loss, or fatigue  RESPIRATORY: No cough, wheezing, chills or hemoptysis; No Shortness of Breath  CARDIOVASCULAR: No chest pain, palpitations, passing out, dizziness, or leg swelling  GASTROINTESTINAL: No abdominal or epigastric pain. No nausea, vomiting, or hematemesis; No diarrhea or constipation. No melena or hematochezia.  VASCULAR: No edema     PHYSICAL EXAM:  T(C): 36.6 (03-11-24 @ 05:10), Max: 36.8 (03-10-24 @ 20:07)  HR: 89 (03-11-24 @ 05:10) (86 - 90)  BP: 139/95 (03-11-24 @ 05:10) (126/82 - 139/95)  RR: 17 (03-11-24 @ 05:10) (17 - 17)  SpO2: 100% (03-11-24 @ 05:10) (97% - 100%)  Wt(kg): --  I&O's Summary    10 Mar 2024 07:01  -  11 Mar 2024 07:00  --------------------------------------------------------  IN: 1260 mL / OUT: 3175 mL / NET: -1915 mL        Appearance: Normal	  Cardiovascular: Normal S1 S2,RRR, No JVD, No murmurs  Respiratory: Lungs clear to auscultation b/l   Gastrointestinal:  Distended but soft, Non-tender, + BS	  Extremities: Normal range of motion, No clubbing, cyanosis. +b/l le edema       Home Medications:  METFORMIN HCL 1,000 MG TABLET: TAKE 1 TABLET BY MOUTH TWICE A DAY WITH MEALS (02 Mar 2024 20:59)  METOPROLOL SUCC ER 50 MG TAB: TAKE 1 TABLET BY MOUTH EVERY DAY (02 Mar 2024 20:59)      MEDICATIONS  (STANDING):  buMETAnide Infusion 0.5 mG/Hr (2.5 mL/Hr) IV Continuous <Continuous>  dextrose 5%. 1000 milliLiter(s) (100 mL/Hr) IV Continuous <Continuous>  dextrose 5%. 1000 milliLiter(s) (50 mL/Hr) IV Continuous <Continuous>  dextrose 50% Injectable 25 Gram(s) IV Push once  dextrose 50% Injectable 25 Gram(s) IV Push once  dextrose 50% Injectable 12.5 Gram(s) IV Push once  glucagon  Injectable 1 milliGRAM(s) IntraMuscular once  hydrALAZINE 25 milliGRAM(s) Oral three times a day  influenza   Vaccine 0.5 milliLiter(s) IntraMuscular once  insulin lispro (ADMELOG) corrective regimen sliding scale   SubCutaneous at bedtime  insulin lispro (ADMELOG) corrective regimen sliding scale   SubCutaneous three times a day before meals  losartan 25 milliGRAM(s) Oral daily  metoprolol succinate  milliGRAM(s) Oral daily      TELEMETRY: SR 80-90s, 10 beats vtach 	    ECG:  	  RADIOLOGY:   DIAGNOSTIC TESTING:  [ ] Echocardiogram:  [ ]  Catheterization:  [ ] Stress Test:    OTHER: 	    LABS:	 	                            12.1   4.95  )-----------( 252      ( 11 Mar 2024 05:40 )             37.8     03-11    142  |  98  |  31<H>  ----------------------------<  126<H>  3.5   |  30  |  1.17    Ca    8.7      11 Mar 2024 05:40  Phos  3.0     03-11  Mg     1.80     03-11

## 2024-03-11 NOTE — PROGRESS NOTE ADULT - ASSESSMENT
ECHO 3/4/23: EF 33% mild MR, Severe global left ventricular systolic dysfunction  cardiac cath from 3/6/23  Coronaries with minor luminal irregularities. Elevated LVEDP.      A/P  63 yr old male with a pmh of HTN, HF rEF ( EF33%), T2DM on metformin who presents with ROSS and bilateral lower extremity edema with his legs feeling heavy.     #Acute on chronic HFrEF  -cont bumex gtt at 0.5mg/hr  -Add 2.5mg metolazone  -Not compliant with home meds (lasix 40mg daily, metoprolol XL 50mg daily, hydralazine 25mg TID)  -repeat echo this admission with ef 27%   Left ventricular systolic function is severely decreased  -Prior cardiac cath from 3/6/23  Coronaries with minor luminal irregularities. Elevated LVEDP.   -strict I/O, monitor daily weights, fluid restriction   -Continue toprol 300mg PO daily  -eps eval for NSVT, possible  ICD/CRTD for cmp, HF- per EP In light of his noncompliance with optimal medical therapy, he is not a candidate for ICD   -will reassess in 3 months   -[employer] insurance does not cover entresto -- plan to receive last dose tonight-- start cozaar tomorrow AM    -Continue to replete electrolytes prn    #syncope, loc  -depressed ed, ivcd, nsvt on tele, up to 31 beats  -eps eval noted   -ct head unremarkable     #Hypertensive urgency.   -bp improved   -continue hydralazine, BB, entresto, lasix      #T2DM (type 2 diabetes mellitus).   -med f/u     #HLD  -cont statin        dvt ppx         ECHO 3/4/23: EF 33% mild MR, Severe global left ventricular systolic dysfunction  cardiac cath from 3/6/23  Coronaries with minor luminal irregularities. Elevated LVEDP.      A/P  63 yr old male with a pmh of HTN, HF rEF ( EF33%), T2DM on metformin who presents with ROSS and bilateral lower extremity edema with his legs feeling heavy.     #Acute on chronic HFrEF  -cont bumex gtt at 0.5mg/hr  -Add 2.5mg metolazone daily  -Not compliant with home meds (lasix 40mg daily, metoprolol XL 50mg daily, hydralazine 25mg TID)  -repeat echo this admission with ef 27%   Left ventricular systolic function is severely decreased  -Prior cardiac cath from 3/6/23  Coronaries with minor luminal irregularities. Elevated LVEDP.   -strict I/O, monitor daily weights, fluid restriction   -Continue toprol 300mg PO daily  -eps eval for NSVT, possible  ICD/CRTD for cmp, HF- per EP In light of his noncompliance with optimal medical therapy, he is not a candidate for ICD   -will reassess in 3 months   -[employer] insurance does not cover entresto -- plan to receive last dose tonight-- start cozaar tomorrow AM    -Continue to replete electrolytes prn    #syncope, loc  -depressed ed, ivcd, nsvt on tele, up to 31 beats  -eps eval noted   -ct head unremarkable     #Hypertensive urgency.   -bp improved   -continue hydralazine, BB, entresto, lasix      #T2DM (type 2 diabetes mellitus).   -med f/u     #HLD  -cont statin        dvt ppx

## 2024-03-11 NOTE — PROGRESS NOTE ADULT - SUBJECTIVE AND OBJECTIVE BOX
Patient is a 63y old  Male who presents with a chief complaint of Acute decompensated heart failure (11 Mar 2024 09:00)      INTERVAL HPI/OVERNIGHT EVENTS: seen and examined , breathing better     T(C): 36.5 (03-11-24 @ 20:02), Max: 36.6 (03-11-24 @ 05:10)  HR: 73 (03-11-24 @ 20:02) (73 - 89)  BP: 124/81 (03-11-24 @ 20:02) (124/81 - 139/95)  RR: 18 (03-11-24 @ 20:02) (17 - 18)  SpO2: 98% (03-11-24 @ 20:02) (98% - 100%)  Wt(kg): --  I&O's Summary    10 Mar 2024 07:01  -  11 Mar 2024 07:00  --------------------------------------------------------  IN: 1260 mL / OUT: 3175 mL / NET: -1915 mL    11 Mar 2024 07:01  -  11 Mar 2024 23:36  --------------------------------------------------------  IN: 0 mL / OUT: 2925 mL / NET: -2925 mL        PAST MEDICAL & SURGICAL HISTORY:  HTN (hypertension)      HLD (hyperlipidemia)      DM (diabetes mellitus)      HFrEF (heart failure with reduced ejection fraction)      No significant past surgical history          SOCIAL HISTORY  Alcohol:  Tobacco:  Illicit substance use:    FAMILY HISTORY:    REVIEW OF SYSTEMS:  CONSTITUTIONAL: No fever, weight loss, or fatigue  EYES: No eye pain, visual disturbances, or discharge  ENMT:  No difficulty hearing, tinnitus, vertigo; No sinus or throat pain  NECK: No pain or stiffness  RESPIRATORY: No cough, wheezing, chills or hemoptysis; No shortness of breath  CARDIOVASCULAR: No chest pain, palpitations, dizziness, or leg swelling  GASTROINTESTINAL: No abdominal or epigastric pain. No nausea, vomiting, or hematemesis; No diarrhea or constipation. No melena or hematochezia.  GENITOURINARY: No dysuria, frequency, hematuria, or incontinence  NEUROLOGICAL: No headaches, memory loss, loss of strength, numbness, or tremors  SKIN: No itching, burning, rashes, or lesions   LYMPH NODES: No enlarged glands  ENDOCRINE: No heat or cold intolerance; No hair loss  MUSCULOSKELETAL: No joint pain or swelling; No muscle, back, or extremity pain  PSYCHIATRIC: No depression, anxiety, mood swings, or difficulty sleeping  HEME/LYMPH: No easy bruising, or bleeding gums  ALLERY AND IMMUNOLOGIC: No hives or eczema    RADIOLOGY & ADDITIONAL TESTS:    Imaging Personally Reviewed:  [ ] YES  [ ] NO    Consultant(s) Notes Reviewed:  [ ] YES  [ ] NO    PHYSICAL EXAM:  GENERAL: NAD, well-groomed, well-developed  HEAD:  Atraumatic, Normocephalic  EYES: EOMI, PERRLA, conjunctiva and sclera clear  ENMT: No tonsillar erythema, exudates, or enlargement; Moist mucous membranes, Good dentition, No lesions  NECK: Supple, No JVD, Normal thyroid  NERVOUS SYSTEM:  Alert & Oriented X3, Good concentration; Motor Strength 5/5 B/L upper and lower extremities; DTRs 2+ intact and symmetric  CHEST/LUNG: Clear to percussion bilaterally; No rales, rhonchi, wheezing, or rubs  HEART: Regular rate and rhythm; No murmurs, rubs, or gallops  ABDOMEN: Soft, Nontender, Nondistended; Bowel sounds present  EXTREMITIES:  2+ Peripheral Pulses, No clubbing, cyanosis, or edema  LYMPH: No lymphadenopathy noted  SKIN: No rashes or lesions    LABS:                        12.1   4.95  )-----------( 252      ( 11 Mar 2024 05:40 )             37.8     03-11    142  |  98  |  31<H>  ----------------------------<  126<H>  3.5   |  30  |  1.17    Ca    8.7      11 Mar 2024 05:40  Phos  3.0     03-11  Mg     1.80     03-11        Urinalysis Basic - ( 11 Mar 2024 05:40 )    Color: x / Appearance: x / SG: x / pH: x  Gluc: 126 mg/dL / Ketone: x  / Bili: x / Urobili: x   Blood: x / Protein: x / Nitrite: x   Leuk Esterase: x / RBC: x / WBC x   Sq Epi: x / Non Sq Epi: x / Bacteria: x      CAPILLARY BLOOD GLUCOSE      POCT Blood Glucose.: 143 mg/dL (11 Mar 2024 22:56)  POCT Blood Glucose.: 159 mg/dL (11 Mar 2024 17:28)  POCT Blood Glucose.: 138 mg/dL (11 Mar 2024 12:24)  POCT Blood Glucose.: 112 mg/dL (11 Mar 2024 08:40)        Urinalysis Basic - ( 11 Mar 2024 05:40 )    Color: x / Appearance: x / SG: x / pH: x  Gluc: 126 mg/dL / Ketone: x  / Bili: x / Urobili: x   Blood: x / Protein: x / Nitrite: x   Leuk Esterase: x / RBC: x / WBC x   Sq Epi: x / Non Sq Epi: x / Bacteria: x        MEDICATIONS  (STANDING):  buMETAnide Infusion 0.5 mG/Hr (2.5 mL/Hr) IV Continuous <Continuous>  dextrose 5%. 1000 milliLiter(s) (100 mL/Hr) IV Continuous <Continuous>  dextrose 5%. 1000 milliLiter(s) (50 mL/Hr) IV Continuous <Continuous>  dextrose 50% Injectable 25 Gram(s) IV Push once  dextrose 50% Injectable 12.5 Gram(s) IV Push once  dextrose 50% Injectable 25 Gram(s) IV Push once  glucagon  Injectable 1 milliGRAM(s) IntraMuscular once  hydrALAZINE 25 milliGRAM(s) Oral three times a day  influenza   Vaccine 0.5 milliLiter(s) IntraMuscular once  insulin lispro (ADMELOG) corrective regimen sliding scale   SubCutaneous at bedtime  insulin lispro (ADMELOG) corrective regimen sliding scale   SubCutaneous three times a day before meals  losartan 25 milliGRAM(s) Oral daily  metoprolol succinate  milliGRAM(s) Oral daily    MEDICATIONS  (PRN):  acetaminophen     Tablet .. 650 milliGRAM(s) Oral every 6 hours PRN Temp greater or equal to 38C (100.4F), Mild Pain (1 - 3)  aluminum hydroxide/magnesium hydroxide/simethicone Suspension 30 milliLiter(s) Oral every 4 hours PRN Dyspepsia  dextrose Oral Gel 15 Gram(s) Oral once PRN Blood Glucose LESS THAN 70 milliGRAM(s)/deciliter  melatonin 3 milliGRAM(s) Oral at bedtime PRN Insomnia  ondansetron Injectable 4 milliGRAM(s) IV Push every 8 hours PRN Nausea and/or Vomiting      Care Discussed with Consultants/Other Providers [ ] YES  [ ] NO

## 2024-03-11 NOTE — PROGRESS NOTE ADULT - ASSESSMENT
Assessment and Plan:   · 	  In summary, Mr. Duran is a 64 yo man with hx HTN, NICM (EF 27%), T2DM on metformin who presented with acute on chronic ADHF. worsening since Thanksgiving 2023, and Pt endorses 2 falls due to lower extremity weakness but denies syncope. Per records patient noncompliant, hospitalization for HF in 2023, NSVT ( telemetry : longest run 39 beats). Patient on aggressively diureses. EP consulted for ICD therapy.   TTE: LVEF 27% RV dysfunction   LHC: coronaries with minor luminal irregularities  EKG from 3/2023 without RBBB/LBBB; normal QRS duration       Recommendations:  -Continue telemetry   -maintain lytes K>4, Mag>2  -continue metoprolol succinate 200mg daily   -Continue aggressive diuresis per HF and recommend GDMT and will consider ICD therapy in 3 months if LVEF < 35%.        Thank you,   KELVIN Mccullough

## 2024-03-12 LAB
ANION GAP SERPL CALC-SCNC: 12 MMOL/L — SIGNIFICANT CHANGE UP (ref 7–14)
BASOPHILS # BLD AUTO: 0.02 K/UL — SIGNIFICANT CHANGE UP (ref 0–0.2)
BASOPHILS NFR BLD AUTO: 0.4 % — SIGNIFICANT CHANGE UP (ref 0–2)
BUN SERPL-MCNC: 33 MG/DL — HIGH (ref 7–23)
CALCIUM SERPL-MCNC: 9.1 MG/DL — SIGNIFICANT CHANGE UP (ref 8.4–10.5)
CHLORIDE SERPL-SCNC: 93 MMOL/L — LOW (ref 98–107)
CO2 SERPL-SCNC: 36 MMOL/L — HIGH (ref 22–31)
CREAT SERPL-MCNC: 1.26 MG/DL — SIGNIFICANT CHANGE UP (ref 0.5–1.3)
EGFR: 64 ML/MIN/1.73M2 — SIGNIFICANT CHANGE UP
EOSINOPHIL # BLD AUTO: 0.13 K/UL — SIGNIFICANT CHANGE UP (ref 0–0.5)
EOSINOPHIL NFR BLD AUTO: 2.9 % — SIGNIFICANT CHANGE UP (ref 0–6)
GLUCOSE BLDC GLUCOMTR-MCNC: 105 MG/DL — HIGH (ref 70–99)
GLUCOSE BLDC GLUCOMTR-MCNC: 142 MG/DL — HIGH (ref 70–99)
GLUCOSE BLDC GLUCOMTR-MCNC: 151 MG/DL — HIGH (ref 70–99)
GLUCOSE BLDC GLUCOMTR-MCNC: 160 MG/DL — HIGH (ref 70–99)
GLUCOSE SERPL-MCNC: 122 MG/DL — HIGH (ref 70–99)
HCT VFR BLD CALC: 37.2 % — LOW (ref 39–50)
HGB BLD-MCNC: 12 G/DL — LOW (ref 13–17)
IANC: 2.1 K/UL — SIGNIFICANT CHANGE UP (ref 1.8–7.4)
IMM GRANULOCYTES NFR BLD AUTO: 0 % — SIGNIFICANT CHANGE UP (ref 0–0.9)
LYMPHOCYTES # BLD AUTO: 1.57 K/UL — SIGNIFICANT CHANGE UP (ref 1–3.3)
LYMPHOCYTES # BLD AUTO: 34.6 % — SIGNIFICANT CHANGE UP (ref 13–44)
MAGNESIUM SERPL-MCNC: 1.8 MG/DL — SIGNIFICANT CHANGE UP (ref 1.6–2.6)
MCHC RBC-ENTMCNC: 28.9 PG — SIGNIFICANT CHANGE UP (ref 27–34)
MCHC RBC-ENTMCNC: 32.3 GM/DL — SIGNIFICANT CHANGE UP (ref 32–36)
MCV RBC AUTO: 89.6 FL — SIGNIFICANT CHANGE UP (ref 80–100)
MONOCYTES # BLD AUTO: 0.72 K/UL — SIGNIFICANT CHANGE UP (ref 0–0.9)
MONOCYTES NFR BLD AUTO: 15.9 % — HIGH (ref 2–14)
NEUTROPHILS # BLD AUTO: 2.1 K/UL — SIGNIFICANT CHANGE UP (ref 1.8–7.4)
NEUTROPHILS NFR BLD AUTO: 46.2 % — SIGNIFICANT CHANGE UP (ref 43–77)
NRBC # BLD: 0 /100 WBCS — SIGNIFICANT CHANGE UP (ref 0–0)
NRBC # FLD: 0 K/UL — SIGNIFICANT CHANGE UP (ref 0–0)
PHOSPHATE SERPL-MCNC: 3.4 MG/DL — SIGNIFICANT CHANGE UP (ref 2.5–4.5)
PLATELET # BLD AUTO: 239 K/UL — SIGNIFICANT CHANGE UP (ref 150–400)
POTASSIUM SERPL-MCNC: 3 MMOL/L — LOW (ref 3.5–5.3)
POTASSIUM SERPL-SCNC: 3 MMOL/L — LOW (ref 3.5–5.3)
RBC # BLD: 4.15 M/UL — LOW (ref 4.2–5.8)
RBC # FLD: 15.3 % — HIGH (ref 10.3–14.5)
SODIUM SERPL-SCNC: 141 MMOL/L — SIGNIFICANT CHANGE UP (ref 135–145)
WBC # BLD: 4.54 K/UL — SIGNIFICANT CHANGE UP (ref 3.8–10.5)
WBC # FLD AUTO: 4.54 K/UL — SIGNIFICANT CHANGE UP (ref 3.8–10.5)

## 2024-03-12 PROCEDURE — 99233 SBSQ HOSP IP/OBS HIGH 50: CPT

## 2024-03-12 RX ORDER — POTASSIUM CHLORIDE 20 MEQ
40 PACKET (EA) ORAL EVERY 4 HOURS
Refills: 0 | Status: COMPLETED | OUTPATIENT
Start: 2024-03-12 | End: 2024-03-12

## 2024-03-12 RX ORDER — BUMETANIDE 0.25 MG/ML
2 INJECTION INTRAMUSCULAR; INTRAVENOUS
Refills: 0 | Status: COMPLETED | OUTPATIENT
Start: 2024-03-12 | End: 2024-03-13

## 2024-03-12 RX ADMIN — Medication 1: at 17:58

## 2024-03-12 RX ADMIN — BUMETANIDE 2 MILLIGRAM(S): 0.25 INJECTION INTRAMUSCULAR; INTRAVENOUS at 17:56

## 2024-03-12 RX ADMIN — Medication 300 MILLIGRAM(S): at 05:18

## 2024-03-12 RX ADMIN — Medication 25 MILLIGRAM(S): at 13:01

## 2024-03-12 RX ADMIN — Medication 25 MILLIGRAM(S): at 05:19

## 2024-03-12 RX ADMIN — Medication 40 MILLIEQUIVALENT(S): at 13:00

## 2024-03-12 RX ADMIN — Medication 40 MILLIEQUIVALENT(S): at 17:58

## 2024-03-12 RX ADMIN — LOSARTAN POTASSIUM 25 MILLIGRAM(S): 100 TABLET, FILM COATED ORAL at 05:19

## 2024-03-12 RX ADMIN — Medication 40 MILLIEQUIVALENT(S): at 21:20

## 2024-03-12 RX ADMIN — Medication 25 MILLIGRAM(S): at 21:21

## 2024-03-12 NOTE — PROGRESS NOTE ADULT - SUBJECTIVE AND OBJECTIVE BOX
Patient is a 63y old  Male who presents with a chief complaint of Acute decompensated heart failure (12 Mar 2024 11:03)      INTERVAL HPI/OVERNIGHT EVENTS:  T(C): 36.9 (03-12-24 @ 20:04), Max: 36.9 (03-12-24 @ 20:04)  HR: 85 (03-12-24 @ 21:20) (78 - 87)  BP: 120/80 (03-12-24 @ 21:20) (109/73 - 143/88)  RR: 17 (03-12-24 @ 20:04) (17 - 18)  SpO2: 100% (03-12-24 @ 20:04) (98% - 100%)  Wt(kg): --  I&O's Summary    11 Mar 2024 07:01  -  12 Mar 2024 07:00  --------------------------------------------------------  IN: 0 mL / OUT: 7625 mL / NET: -7625 mL    12 Mar 2024 07:01  -  12 Mar 2024 23:29  --------------------------------------------------------  IN: 0 mL / OUT: 1075 mL / NET: -1075 mL        PAST MEDICAL & SURGICAL HISTORY:  HTN (hypertension)      HLD (hyperlipidemia)      DM (diabetes mellitus)      HFrEF (heart failure with reduced ejection fraction)      No significant past surgical history          SOCIAL HISTORY  Alcohol:  Tobacco:  Illicit substance use:    FAMILY HISTORY:    REVIEW OF SYSTEMS:  CONSTITUTIONAL: No fever, weight loss, or fatigue  EYES: No eye pain, visual disturbances, or discharge  ENMT:  No difficulty hearing, tinnitus, vertigo; No sinus or throat pain  NECK: No pain or stiffness  RESPIRATORY: No cough, wheezing, chills or hemoptysis; No shortness of breath  CARDIOVASCULAR: No chest pain, palpitations, dizziness, or leg swelling  GASTROINTESTINAL: No abdominal or epigastric pain. No nausea, vomiting, or hematemesis; No diarrhea or constipation. No melena or hematochezia.  GENITOURINARY: No dysuria, frequency, hematuria, or incontinence  NEUROLOGICAL: No headaches, memory loss, loss of strength, numbness, or tremors  SKIN: No itching, burning, rashes, or lesions   LYMPH NODES: No enlarged glands  ENDOCRINE: No heat or cold intolerance; No hair loss  MUSCULOSKELETAL: No joint pain or swelling; No muscle, back, or extremity pain  PSYCHIATRIC: No depression, anxiety, mood swings, or difficulty sleeping  HEME/LYMPH: No easy bruising, or bleeding gums  ALLERY AND IMMUNOLOGIC: No hives or eczema    RADIOLOGY & ADDITIONAL TESTS:    Imaging Personally Reviewed:  [ ] YES  [ ] NO    Consultant(s) Notes Reviewed:  [ ] YES  [ ] NO    PHYSICAL EXAM:  GENERAL: NAD, well-groomed, well-developed  HEAD:  Atraumatic, Normocephalic  EYES: EOMI, PERRLA, conjunctiva and sclera clear  ENMT: No tonsillar erythema, exudates, or enlargement; Moist mucous membranes, Good dentition, No lesions  NECK: Supple, No JVD, Normal thyroid  NERVOUS SYSTEM:  Alert & Oriented X3, Good concentration; Motor Strength 5/5 B/L upper and lower extremities; DTRs 2+ intact and symmetric  CHEST/LUNG: Clear to percussion bilaterally; No rales, rhonchi, wheezing, or rubs  HEART: Regular rate and rhythm; No murmurs, rubs, or gallops  ABDOMEN: Soft, Nontender, Nondistended; Bowel sounds present  EXTREMITIES:  2+ Peripheral Pulses, No clubbing, cyanosis, or edema  LYMPH: No lymphadenopathy noted  SKIN: No rashes or lesions    LABS:                        12.0   4.54  )-----------( 239      ( 12 Mar 2024 07:57 )             37.2     03-12    141  |  93<L>  |  33<H>  ----------------------------<  122<H>  3.0<L>   |  36<H>  |  1.26    Ca    9.1      12 Mar 2024 07:57  Phos  3.4     03-12  Mg     1.80     03-12        Urinalysis Basic - ( 12 Mar 2024 07:57 )    Color: x / Appearance: x / SG: x / pH: x  Gluc: 122 mg/dL / Ketone: x  / Bili: x / Urobili: x   Blood: x / Protein: x / Nitrite: x   Leuk Esterase: x / RBC: x / WBC x   Sq Epi: x / Non Sq Epi: x / Bacteria: x      CAPILLARY BLOOD GLUCOSE      POCT Blood Glucose.: 160 mg/dL (12 Mar 2024 22:57)  POCT Blood Glucose.: 151 mg/dL (12 Mar 2024 17:12)  POCT Blood Glucose.: 142 mg/dL (12 Mar 2024 12:59)  POCT Blood Glucose.: 105 mg/dL (12 Mar 2024 08:53)        Urinalysis Basic - ( 12 Mar 2024 07:57 )    Color: x / Appearance: x / SG: x / pH: x  Gluc: 122 mg/dL / Ketone: x  / Bili: x / Urobili: x   Blood: x / Protein: x / Nitrite: x   Leuk Esterase: x / RBC: x / WBC x   Sq Epi: x / Non Sq Epi: x / Bacteria: x        MEDICATIONS  (STANDING):  buMETAnide Injectable 2 milliGRAM(s) IV Push two times a day  dextrose 5%. 1000 milliLiter(s) (100 mL/Hr) IV Continuous <Continuous>  dextrose 5%. 1000 milliLiter(s) (50 mL/Hr) IV Continuous <Continuous>  dextrose 50% Injectable 25 Gram(s) IV Push once  dextrose 50% Injectable 12.5 Gram(s) IV Push once  dextrose 50% Injectable 25 Gram(s) IV Push once  glucagon  Injectable 1 milliGRAM(s) IntraMuscular once  hydrALAZINE 25 milliGRAM(s) Oral three times a day  influenza   Vaccine 0.5 milliLiter(s) IntraMuscular once  insulin lispro (ADMELOG) corrective regimen sliding scale   SubCutaneous three times a day before meals  insulin lispro (ADMELOG) corrective regimen sliding scale   SubCutaneous at bedtime  losartan 25 milliGRAM(s) Oral daily  metolazone 2.5 milliGRAM(s) Oral daily  metoprolol succinate  milliGRAM(s) Oral daily    MEDICATIONS  (PRN):  acetaminophen     Tablet .. 650 milliGRAM(s) Oral every 6 hours PRN Temp greater or equal to 38C (100.4F), Mild Pain (1 - 3)  aluminum hydroxide/magnesium hydroxide/simethicone Suspension 30 milliLiter(s) Oral every 4 hours PRN Dyspepsia  dextrose Oral Gel 15 Gram(s) Oral once PRN Blood Glucose LESS THAN 70 milliGRAM(s)/deciliter  melatonin 3 milliGRAM(s) Oral at bedtime PRN Insomnia  ondansetron Injectable 4 milliGRAM(s) IV Push every 8 hours PRN Nausea and/or Vomiting      Care Discussed with Consultants/Other Providers [ ] YES  [ ] NO

## 2024-03-12 NOTE — PROGRESS NOTE ADULT - ASSESSMENT
ECHO 3/4/23: EF 33% mild MR, Severe global left ventricular systolic dysfunction  cardiac cath from 3/6/23  Coronaries with minor luminal irregularities. Elevated LVEDP.      A/P  63 yr old male with a pmh of HTN, HF rEF ( EF33%), T2DM on metformin who presents with ROSS and bilateral lower extremity edema with his legs feeling heavy.     #Acute on chronic HFrEF  -can change bumex to 2 mg IVP BID   -c/w 2.5mg metolazone daily  -Not compliant with home meds (lasix 40mg daily, metoprolol XL 50mg daily, hydralazine 25mg TID)  -repeat echo this admission with ef 27%   Left ventricular systolic function is severely decreased  -Prior cardiac cath from 3/6/23  Coronaries with minor luminal irregularities. Elevated LVEDP.   -strict I/O, monitor daily weights, fluid restriction   -Continue toprol 300mg PO daily-- events on tele noted   -eps eval for NSVT, possible  ICD/CRTD for cmp, HF- per EP In light of his noncompliance with optimal medical therapy, he is not a candidate for ICD   -will reassess in 3 months   -[employer] insurance does not cover entresto   -c/w losartan     #syncope, loc  -depressed ed, ivcd, nsvt on tele, up to 31 beats  -eps eval noted   -ct head unremarkable     #Hypertensive urgency.   -bp improved   -continue meds     #T2DM (type 2 diabetes mellitus).   -med f/u     #HLD  -cont statin        dvt ppx         ECHO 3/4/23: EF 33% mild MR, Severe global left ventricular systolic dysfunction  cardiac cath from 3/6/23  Coronaries with minor luminal irregularities. Elevated LVEDP.      A/P  63 yr old male with a pmh of HTN, HF rEF ( EF33%), T2DM on metformin who presents with ROSS and bilateral lower extremity edema with his legs feeling heavy.     #Acute on chronic HFrEF  -can change bumex to 2 mg IVP BID   -c/w 2.5mg metolazone daily  -SUPPLEMENT K   -Not compliant with home meds (lasix 40mg daily, metoprolol XL 50mg daily, hydralazine 25mg TID)  -repeat echo this admission with ef 27%   Left ventricular systolic function is severely decreased  -Prior cardiac cath from 3/6/23  Coronaries with minor luminal irregularities. Elevated LVEDP.   -strict I/O, monitor daily weights, fluid restriction   -Continue toprol 300mg PO daily-- events on tele noted   -eps eval for NSVT, possible  ICD/CRTD for cmp, HF- per EP In light of his noncompliance with optimal medical therapy, he is not a candidate for ICD   -will reassess in 3 months   -[employer] insurance does not cover entresto   -c/w losartan     #syncope, loc  -depressed ed, ivcd, nsvt on tele, up to 31 beats  -eps eval noted   -ct head unremarkable     #Hypertensive urgency.   -bp improved   -continue meds     #T2DM (type 2 diabetes mellitus).   -med f/u     #HLD  -cont statin        dvt ppx

## 2024-03-12 NOTE — PROGRESS NOTE ADULT - SUBJECTIVE AND OBJECTIVE BOX
24H hour events: Pt seen and examined at bedside, no acute complaints. Denies NVD, SOB, ROSS, CP, palpitations, dizziness, HA  Overnight tele: SR 70-90s, brief episodes of NSVT, 3-20 beat duration, asymptomatic    MEDICATIONS:  buMETAnide Injectable 2 milliGRAM(s) IV Push two times a day  hydrALAZINE 25 milliGRAM(s) Oral three times a day  losartan 25 milliGRAM(s) Oral daily  metolazone 2.5 milliGRAM(s) Oral daily  metoprolol succinate  milliGRAM(s) Oral daily  acetaminophen     Tablet .. 650 milliGRAM(s) Oral every 6 hours PRN  melatonin 3 milliGRAM(s) Oral at bedtime PRN  ondansetron Injectable 4 milliGRAM(s) IV Push every 8 hours PRN  aluminum hydroxide/magnesium hydroxide/simethicone Suspension 30 milliLiter(s) Oral every 4 hours PRN  dextrose 50% Injectable 25 Gram(s) IV Push once  dextrose 50% Injectable 25 Gram(s) IV Push once  dextrose 50% Injectable 12.5 Gram(s) IV Push once  dextrose Oral Gel 15 Gram(s) Oral once PRN  glucagon  Injectable 1 milliGRAM(s) IntraMuscular once  insulin lispro (ADMELOG) corrective regimen sliding scale   SubCutaneous at bedtime  insulin lispro (ADMELOG) corrective regimen sliding scale   SubCutaneous three times a day before meals  dextrose 5%. 1000 milliLiter(s) IV Continuous <Continuous>  dextrose 5%. 1000 milliLiter(s) IV Continuous <Continuous>  influenza   Vaccine 0.5 milliLiter(s) IntraMuscular once  potassium chloride    Tablet ER 40 milliEquivalent(s) Oral every 4 hours    REVIEW OF SYSTEMS:  See HPI, otherwise ROS negative.    PHYSICAL EXAM:  T(C): 36.7 (03-12-24 @ 05:15), Max: 36.7 (03-12-24 @ 05:15)  HR: 78 (03-12-24 @ 05:15) (73 - 80)  BP: 143/88 (03-12-24 @ 05:15) (121/86 - 143/88)  RR: 18 (03-12-24 @ 05:15) (18 - 18)  SpO2: 98% (03-12-24 @ 05:15) (98% - 98%)  Wt(kg): --  I&O's Summary    11 Mar 2024 07:01  -  12 Mar 2024 07:00  --------------------------------------------------------  IN: 0 mL / OUT: 7625 mL / NET: -7625 mL    12 Mar 2024 07:01  -  12 Mar 2024 12:39  --------------------------------------------------------  IN: 0 mL / OUT: 1075 mL / NET: -1075 mL    Appearance: Alert. NAD	  Cardiovascular: +S1S2 RRR  Respiratory: CTA B/L	  Psychiatry: A & O x 3, Mood & affect appropriate  Gastrointestinal:  Soft, NT. ND. +BS	  Skin: No rashes	  Neurologic: Non-focal  Extremities: +2 edema BLE  Vascular: Peripheral pulses palpable 2+ bilaterally    LABS:	 	    CBC Full  -  ( 12 Mar 2024 07:57 )  WBC Count : 4.54 K/uL  Hemoglobin : 12.0 g/dL  Hematocrit : 37.2 %  Platelet Count - Automated : 239 K/uL  Mean Cell Volume : 89.6 fL  Mean Cell Hemoglobin : 28.9 pg  Mean Cell Hemoglobin Concentration : 32.3 gm/dL  Auto Neutrophil # : 2.10 K/uL  Auto Lymphocyte # : 1.57 K/uL  Auto Monocyte # : 0.72 K/uL  Auto Eosinophil # : 0.13 K/uL  Auto Basophil # : 0.02 K/uL  Auto Neutrophil % : 46.2 %  Auto Lymphocyte % : 34.6 %  Auto Monocyte % : 15.9 %  Auto Eosinophil % : 2.9 %  Auto Basophil % : 0.4 %    03-12    141  |  93<L>  |  33<H>  ----------------------------<  122<H>  3.0<L>   |  36<H>  |  1.26  03-11    142  |  98  |  31<H>  ----------------------------<  126<H>  3.5   |  30  |  1.17    Ca    9.1      12 Mar 2024 07:57  Ca    8.7      11 Mar 2024 05:40  Phos  3.4     03-12  Phos  3.0     03-11  Mg     1.80     03-12  Mg     1.80     03-11    TELEMETRY: SR 70-90s, brief runs NSVT

## 2024-03-12 NOTE — PROGRESS NOTE ADULT - ASSESSMENT
63 yr old male with a pmh of HTN, HF rEF ( EF33%), T2DM on metformin who presents with ROSS and bilateral lower extremity edema with his legs feeling heavy.     #Acute on chronic systolic heart Failure : due to non complaince with meds : d/w pt and niece need to adhere with meds and need for AICD after re-evaluation of compliance   cont diuresis   on bumext GTT   -started on entresto : not covered by insurance , will be switched to cozaar from am   c/w Toprol  mg daily   Cardio following   EF <35%        #syncope  #NSVT on tele   -EP cardio consulted : apprecaite input : In light of his noncompliance with optimal medical therapy, he is not a candidate for ICD for primary prevention at this time despite telemetry evidence of NSVT and LVEF 33%. . Our recommendation would be to continue aggressive diuresis while continuing GDMT for a duration of 3 months then reevaluate his LVEF. His ectopy burden will likely decrease when patient no longer in decompensated heart failure.   Keep K+ > 4.0.   K+ 3.4 today.  Please replete.  on Toprol  mg daily      tele monitoring    #Hypertensive   -BP improved   c/w present meds     #DM-2   a1c 7.1  c/w Insulin / FSBS    #HLD  -cont statin

## 2024-03-12 NOTE — PROGRESS NOTE ADULT - SUBJECTIVE AND OBJECTIVE BOX
CARDIOLOGY FOLLOW UP - Dr. Zavala  DATE OF SERVICE: 3/12/24    CC no cp or sob  events on tele noted       REVIEW OF SYSTEMS:  CONSTITUTIONAL: No fever, weight loss, or fatigue  RESPIRATORY: No cough, wheezing, chills or hemoptysis; No Shortness of Breath  CARDIOVASCULAR: No chest pain, palpitations, passing out, dizziness, or leg swelling  GASTROINTESTINAL: No abdominal or epigastric pain. No nausea, vomiting, or hematemesis; No diarrhea or constipation. No melena or hematochezia.  VASCULAR: No edema     PHYSICAL EXAM:  T(C): 36.7 (03-12-24 @ 05:15), Max: 36.7 (03-12-24 @ 05:15)  HR: 78 (03-12-24 @ 05:15) (73 - 82)  BP: 143/88 (03-12-24 @ 05:15) (121/86 - 143/88)  RR: 18 (03-12-24 @ 05:15) (18 - 18)  SpO2: 98% (03-12-24 @ 05:15) (98% - 100%)  Wt(kg): --  I&O's Summary    11 Mar 2024 07:01  -  12 Mar 2024 07:00  --------------------------------------------------------  IN: 0 mL / OUT: 7625 mL / NET: -7625 mL    12 Mar 2024 07:01  -  12 Mar 2024 11:03  --------------------------------------------------------  IN: 0 mL / OUT: 1075 mL / NET: -1075 mL        Appearance: Normal	  Cardiovascular: Normal S1 S2,RRR, No JVD, No murmurs  Respiratory: diminished   Gastrointestinal:  Soft, Non-tender, + BS	  Extremities: edema       Home Medications:  METFORMIN HCL 1,000 MG TABLET: TAKE 1 TABLET BY MOUTH TWICE A DAY WITH MEALS (02 Mar 2024 20:59)  METOPROLOL SUCC ER 50 MG TAB: TAKE 1 TABLET BY MOUTH EVERY DAY (02 Mar 2024 20:59)      MEDICATIONS  (STANDING):  buMETAnide Infusion 0.5 mG/Hr (2.5 mL/Hr) IV Continuous <Continuous>  dextrose 5%. 1000 milliLiter(s) (100 mL/Hr) IV Continuous <Continuous>  dextrose 5%. 1000 milliLiter(s) (50 mL/Hr) IV Continuous <Continuous>  dextrose 50% Injectable 25 Gram(s) IV Push once  dextrose 50% Injectable 12.5 Gram(s) IV Push once  dextrose 50% Injectable 25 Gram(s) IV Push once  glucagon  Injectable 1 milliGRAM(s) IntraMuscular once  hydrALAZINE 25 milliGRAM(s) Oral three times a day  influenza   Vaccine 0.5 milliLiter(s) IntraMuscular once  insulin lispro (ADMELOG) corrective regimen sliding scale   SubCutaneous at bedtime  insulin lispro (ADMELOG) corrective regimen sliding scale   SubCutaneous three times a day before meals  losartan 25 milliGRAM(s) Oral daily  metolazone 2.5 milliGRAM(s) Oral daily  metoprolol succinate  milliGRAM(s) Oral daily      TELEMETRY: nsr   20 beats wct last night 	    ECG:  	  RADIOLOGY:   DIAGNOSTIC TESTING:  [ ] Echocardiogram:  [ ]  Catheterization:  [ ] Stress Test:    OTHER: 	    LABS:	 	                            12.0   4.54  )-----------( 239      ( 12 Mar 2024 07:57 )             37.2     03-12    141  |  93<L>  |  33<H>  ----------------------------<  122<H>  3.0<L>   |  36<H>  |  1.26    Ca    9.1      12 Mar 2024 07:57  Phos  3.4     03-12  Mg     1.80     03-12

## 2024-03-12 NOTE — PROGRESS NOTE ADULT - ASSESSMENT
62 y/o male with PMHx of HTN, NICM (EF 27%), T2DM on metformin who presented with acute on chronic ADHF. worsening since Thanksgiving 2023, and Pt endorses 2 falls due to lower extremity weakness but denies syncope. Per records patient noncompliant, hospitalization for HF in 2023, NSVT ( telemetry : longest run 39 beats). Of note, TTE: LVEF 27% RV dysfunction, LHC: coronaries with minor luminal irregularities, EKG from 3/2023 without RBBB/LBBB; normal QRS duration. Patient on aggressive diureses. EP consulted for ICD therapy.    1. HFrEF  2. NSVT    - Continue to monitor on tele while inpatient  - Continue Toprol 300mg daily for NSVT suppression  - Keep K>4.0 and Mg>2.0, replete as needed  - Continue diuresis and GDMT per HF. Pt reports previous non-compliance with HF medications  - Would consider ICD implant if EF<35% after 3 months of continuous GDMT.    - Discussed with EP attending. No acute EP intervention at this time. EP following.       ELODIA Prieto PA-C  [id number]   64 y/o male with PMHx of HTN, NICM (EF 27%), T2DM on metformin who presented with acute on chronic ADHF. worsening since Thanksgiving 2023, and Pt endorses 2 falls due to lower extremity weakness but denies syncope. Per records patient noncompliant, hospitalization for HF in 2023, NSVT ( telemetry : longest run 39 beats). Of note, TTE: LVEF 27% RV dysfunction, LHC: coronaries with minor luminal irregularities, EKG from 3/2023 without RBBB/LBBB; normal QRS duration. Patient on aggressive diureses. EP consulted for ICD therapy.    1. HFrEF  2. NSVT    - Continue to monitor on tele while inpatient  - Continue Toprol 300mg daily for NSVT suppression  - Keep K>4.0 and Mg>2.0, replete as needed  - Continue diuresis and GDMT per HF. Pt reports previous non-compliance with HF medications  - Would consider ICD implant if EF<35% after 3 months of continuous GDMT.    - Discussed with EP attending. No acute EP intervention at this time. EP to sign off, reconsult PRN.       ELODIA Prieto PA-C  [id number]

## 2024-03-13 LAB
ANION GAP SERPL CALC-SCNC: 11 MMOL/L — SIGNIFICANT CHANGE UP (ref 7–14)
BASOPHILS # BLD AUTO: 0.02 K/UL — SIGNIFICANT CHANGE UP (ref 0–0.2)
BASOPHILS NFR BLD AUTO: 0.4 % — SIGNIFICANT CHANGE UP (ref 0–2)
BUN SERPL-MCNC: 39 MG/DL — HIGH (ref 7–23)
CALCIUM SERPL-MCNC: 9.1 MG/DL — SIGNIFICANT CHANGE UP (ref 8.4–10.5)
CHLORIDE SERPL-SCNC: 91 MMOL/L — LOW (ref 98–107)
CO2 SERPL-SCNC: 37 MMOL/L — HIGH (ref 22–31)
CREAT SERPL-MCNC: 1.46 MG/DL — HIGH (ref 0.5–1.3)
EGFR: 53 ML/MIN/1.73M2 — LOW
EOSINOPHIL # BLD AUTO: 0.13 K/UL — SIGNIFICANT CHANGE UP (ref 0–0.5)
EOSINOPHIL NFR BLD AUTO: 2.9 % — SIGNIFICANT CHANGE UP (ref 0–6)
GLUCOSE BLDC GLUCOMTR-MCNC: 115 MG/DL — HIGH (ref 70–99)
GLUCOSE BLDC GLUCOMTR-MCNC: 136 MG/DL — HIGH (ref 70–99)
GLUCOSE BLDC GLUCOMTR-MCNC: 152 MG/DL — HIGH (ref 70–99)
GLUCOSE BLDC GLUCOMTR-MCNC: 155 MG/DL — HIGH (ref 70–99)
GLUCOSE SERPL-MCNC: 128 MG/DL — HIGH (ref 70–99)
HCT VFR BLD CALC: 40.5 % — SIGNIFICANT CHANGE UP (ref 39–50)
HGB BLD-MCNC: 12.8 G/DL — LOW (ref 13–17)
IANC: 2.32 K/UL — SIGNIFICANT CHANGE UP (ref 1.8–7.4)
IMM GRANULOCYTES NFR BLD AUTO: 0.4 % — SIGNIFICANT CHANGE UP (ref 0–0.9)
LYMPHOCYTES # BLD AUTO: 1.36 K/UL — SIGNIFICANT CHANGE UP (ref 1–3.3)
LYMPHOCYTES # BLD AUTO: 30 % — SIGNIFICANT CHANGE UP (ref 13–44)
MAGNESIUM SERPL-MCNC: 1.9 MG/DL — SIGNIFICANT CHANGE UP (ref 1.6–2.6)
MCHC RBC-ENTMCNC: 29.2 PG — SIGNIFICANT CHANGE UP (ref 27–34)
MCHC RBC-ENTMCNC: 31.6 GM/DL — LOW (ref 32–36)
MCV RBC AUTO: 92.3 FL — SIGNIFICANT CHANGE UP (ref 80–100)
MONOCYTES # BLD AUTO: 0.68 K/UL — SIGNIFICANT CHANGE UP (ref 0–0.9)
MONOCYTES NFR BLD AUTO: 15 % — HIGH (ref 2–14)
NEUTROPHILS # BLD AUTO: 2.32 K/UL — SIGNIFICANT CHANGE UP (ref 1.8–7.4)
NEUTROPHILS NFR BLD AUTO: 51.3 % — SIGNIFICANT CHANGE UP (ref 43–77)
NRBC # BLD: 0 /100 WBCS — SIGNIFICANT CHANGE UP (ref 0–0)
NRBC # FLD: 0 K/UL — SIGNIFICANT CHANGE UP (ref 0–0)
PHOSPHATE SERPL-MCNC: 3.7 MG/DL — SIGNIFICANT CHANGE UP (ref 2.5–4.5)
PLATELET # BLD AUTO: 261 K/UL — SIGNIFICANT CHANGE UP (ref 150–400)
POTASSIUM SERPL-MCNC: 3.6 MMOL/L — SIGNIFICANT CHANGE UP (ref 3.5–5.3)
POTASSIUM SERPL-SCNC: 3.6 MMOL/L — SIGNIFICANT CHANGE UP (ref 3.5–5.3)
RBC # BLD: 4.39 M/UL — SIGNIFICANT CHANGE UP (ref 4.2–5.8)
RBC # FLD: 15 % — HIGH (ref 10.3–14.5)
SODIUM SERPL-SCNC: 139 MMOL/L — SIGNIFICANT CHANGE UP (ref 135–145)
WBC # BLD: 4.53 K/UL — SIGNIFICANT CHANGE UP (ref 3.8–10.5)
WBC # FLD AUTO: 4.53 K/UL — SIGNIFICANT CHANGE UP (ref 3.8–10.5)

## 2024-03-13 RX ORDER — BUMETANIDE 0.25 MG/ML
2 INJECTION INTRAMUSCULAR; INTRAVENOUS
Refills: 0 | Status: DISCONTINUED | OUTPATIENT
Start: 2024-03-14 | End: 2024-03-19

## 2024-03-13 RX ADMIN — Medication 1: at 12:50

## 2024-03-13 RX ADMIN — BUMETANIDE 2 MILLIGRAM(S): 0.25 INJECTION INTRAMUSCULAR; INTRAVENOUS at 04:55

## 2024-03-13 RX ADMIN — Medication 25 MILLIGRAM(S): at 21:18

## 2024-03-13 RX ADMIN — LOSARTAN POTASSIUM 25 MILLIGRAM(S): 100 TABLET, FILM COATED ORAL at 04:56

## 2024-03-13 RX ADMIN — Medication 300 MILLIGRAM(S): at 04:55

## 2024-03-13 RX ADMIN — BUMETANIDE 2 MILLIGRAM(S): 0.25 INJECTION INTRAMUSCULAR; INTRAVENOUS at 16:14

## 2024-03-13 RX ADMIN — Medication 25 MILLIGRAM(S): at 04:55

## 2024-03-13 NOTE — PROGRESS NOTE ADULT - SUBJECTIVE AND OBJECTIVE BOX
CARDIOLOGY FOLLOW UP - Dr. Zavala  Date of Service: 3/13/2024  CC: no events    Review of Systems:  Constitutional: No fever, weight loss, or fatigue  Respiratory: No cough, wheezing, or hemoptysis, no shortness of breath  Cardiovascular: No chest pain, palpitations, passing out, dizziness, or leg swelling  Gastrointestinal: No abd or epigastric pain. No nausea, vomiting, or hematemesis; no diarrhea or consiptaiton, no melena or hematochezia  Vascular: No edema     TELEMETRY:    PHYSICAL EXAM:  T(C): 36.6 (03-13-24 @ 09:55), Max: 37 (03-13-24 @ 04:50)  HR: 91 (03-13-24 @ 09:55) (81 - 91)  BP: 101/56 (03-13-24 @ 09:55) (101/56 - 125/85)  RR: 17 (03-13-24 @ 09:55) (17 - 18)  SpO2: 99% (03-13-24 @ 09:55) (99% - 100%)  Wt(kg): --  I&O's Summary    12 Mar 2024 07:01  -  13 Mar 2024 07:00  --------------------------------------------------------  IN: 0 mL / OUT: 1675 mL / NET: -1675 mL    13 Mar 2024 07:01  -  13 Mar 2024 11:15  --------------------------------------------------------  IN: 0 mL / OUT: 1800 mL / NET: -1800 mL        Appearance: Normal	  Cardiovascular: Normal S1 S2,RRR, No JVD, No murmurs  Respiratory: Lungs clear to auscultation	  Gastrointestinal:  Soft, Non-tender, + BS	  Extremities: Normal range of motion, No clubbing, cyanosis or edema  Vascular: Peripheral pulses palpable 2+ bilaterally       Home Medications:  METFORMIN HCL 1,000 MG TABLET: TAKE 1 TABLET BY MOUTH TWICE A DAY WITH MEALS (02 Mar 2024 20:59)  METOPROLOL SUCC ER 50 MG TAB: TAKE 1 TABLET BY MOUTH EVERY DAY (02 Mar 2024 20:59)        MEDICATIONS  (STANDING):  buMETAnide Injectable 2 milliGRAM(s) IV Push two times a day  dextrose 5%. 1000 milliLiter(s) (100 mL/Hr) IV Continuous <Continuous>  dextrose 5%. 1000 milliLiter(s) (50 mL/Hr) IV Continuous <Continuous>  dextrose 50% Injectable 25 Gram(s) IV Push once  dextrose 50% Injectable 12.5 Gram(s) IV Push once  dextrose 50% Injectable 25 Gram(s) IV Push once  glucagon  Injectable 1 milliGRAM(s) IntraMuscular once  hydrALAZINE 25 milliGRAM(s) Oral three times a day  influenza   Vaccine 0.5 milliLiter(s) IntraMuscular once  insulin lispro (ADMELOG) corrective regimen sliding scale   SubCutaneous three times a day before meals  insulin lispro (ADMELOG) corrective regimen sliding scale   SubCutaneous at bedtime  losartan 25 milliGRAM(s) Oral daily  metolazone 2.5 milliGRAM(s) Oral daily  metoprolol succinate  milliGRAM(s) Oral daily        EKG:  RADIOLOGY:  DIAGNOSTIC TESTING:  [ ] Echocardiogram:  [ ] Catherterization:  [ ] Stress Test:  OTHER:     LABS:	 	                          12.8   4.53  )-----------( 261      ( 13 Mar 2024 05:47 )             40.5     03-13    139  |  91<L>  |  39<H>  ----------------------------<  128<H>  3.6   |  37<H>  |  1.46<H>    Ca    9.1      13 Mar 2024 05:47  Phos  3.7     03-13  Mg     1.90     03-13            CARDIAC MARKERS:

## 2024-03-13 NOTE — PROGRESS NOTE ADULT - ASSESSMENT
ECHO 3/4/23: EF 33% mild MR, Severe global left ventricular systolic dysfunction  cardiac cath from 3/6/23  Coronaries with minor luminal irregularities. Elevated LVEDP.      A/P  63 yr old male with a pmh of HTN, HF rEF ( EF33%), T2DM on metformin who presents with ROSS and bilateral lower extremity edema with his legs feeling heavy.     #Acute on chronic HFrEF  -cane bumex to 2 mg PO BID   -c/w 2.5mg metolazone daily  -SUPPLEMENT K   -Not compliant with home meds (lasix 40mg daily, metoprolol XL 50mg daily, hydralazine 25mg TID)  -repeat echo this admission with ef 27%   Left ventricular systolic function is severely decreased  -Prior cardiac cath from 3/6/23  Coronaries with minor luminal irregularities. Elevated LVEDP.   -strict I/O, monitor daily weights, fluid restriction   -Continue toprol 300mg PO daily-- events on tele noted   -eps eval for NSVT, possible  ICD/CRTD for cmp, HF- per EP In light of his noncompliance with optimal medical therapy, he is not a candidate for ICD   -will reassess in 3 months   -PE [employer] insurance does not cover entresto   -c/w losartan     #syncope, loc  -depressed ed, ivcd, nsvt on tele, up to 31 beats  -eps eval noted   -ct head unremarkable     #Hypertensive urgency.   -bp improved   -continue meds     #T2DM (type 2 diabetes mellitus).   -med f/u     #HLD  -cont statin        dvt ppx      35 minutes spent on total encounter; more than 50% of the visit was spent counseling and/or coordinating care by the attending physician.

## 2024-03-13 NOTE — PROGRESS NOTE ADULT - SUBJECTIVE AND OBJECTIVE BOX
Patient is a 63y old  Male who presents with a chief complaint of Acute decompensated heart failure (13 Mar 2024 11:15)      INTERVAL HPI/OVERNIGHT EVENTS: seen and examined   T(C): 36.7 (03-13-24 @ 19:49), Max: 37 (03-13-24 @ 04:50)  HR: 89 (03-13-24 @ 19:49) (86 - 99)  BP: 115/76 (03-13-24 @ 19:49) (101/56 - 125/85)  RR: 18 (03-13-24 @ 19:49) (17 - 18)  SpO2: 98% (03-13-24 @ 19:49) (97% - 100%)  Wt(kg): --  I&O's Summary    12 Mar 2024 07:01  -  13 Mar 2024 07:00  --------------------------------------------------------  IN: 0 mL / OUT: 1675 mL / NET: -1675 mL    13 Mar 2024 07:01  -  13 Mar 2024 23:32  --------------------------------------------------------  IN: 0 mL / OUT: 2700 mL / NET: -2700 mL        PAST MEDICAL & SURGICAL HISTORY:  HTN (hypertension)      HLD (hyperlipidemia)      DM (diabetes mellitus)      HFrEF (heart failure with reduced ejection fraction)      No significant past surgical history          SOCIAL HISTORY  Alcohol:  Tobacco:  Illicit substance use:    FAMILY HISTORY:    REVIEW OF SYSTEMS:  CONSTITUTIONAL: No fever, weight loss, or fatigue  EYES: No eye pain, visual disturbances, or discharge  ENMT:  No difficulty hearing, tinnitus, vertigo; No sinus or throat pain  NECK: No pain or stiffness  RESPIRATORY: No cough, wheezing, chills or hemoptysis; No shortness of breath  CARDIOVASCULAR: No chest pain, palpitations, dizziness, or leg swelling  GASTROINTESTINAL: No abdominal or epigastric pain. No nausea, vomiting, or hematemesis; No diarrhea or constipation. No melena or hematochezia.  GENITOURINARY: No dysuria, frequency, hematuria, or incontinence  NEUROLOGICAL: No headaches, memory loss, loss of strength, numbness, or tremors  SKIN: No itching, burning, rashes, or lesions   LYMPH NODES: No enlarged glands  ENDOCRINE: No heat or cold intolerance; No hair loss  MUSCULOSKELETAL: No joint pain or swelling; No muscle, back, or extremity pain  PSYCHIATRIC: No depression, anxiety, mood swings, or difficulty sleeping  HEME/LYMPH: No easy bruising, or bleeding gums  ALLERY AND IMMUNOLOGIC: No hives or eczema    RADIOLOGY & ADDITIONAL TESTS:    Imaging Personally Reviewed:  [ ] YES  [ ] NO    Consultant(s) Notes Reviewed:  [ ] YES  [ ] NO    PHYSICAL EXAM:  GENERAL: NAD, well-groomed, well-developed  HEAD:  Atraumatic, Normocephalic  EYES: EOMI, PERRLA, conjunctiva and sclera clear  ENMT: No tonsillar erythema, exudates, or enlargement; Moist mucous membranes, Good dentition, No lesions  NECK: Supple, No JVD, Normal thyroid  NERVOUS SYSTEM:  Alert & Oriented X3, Good concentration; Motor Strength 5/5 B/L upper and lower extremities; DTRs 2+ intact and symmetric  CHEST/LUNG: Clear to percussion bilaterally; No rales, rhonchi, wheezing, or rubs  HEART: Regular rate and rhythm; No murmurs, rubs, or gallops  ABDOMEN: Soft, Nontender, Nondistended; Bowel sounds present  EXTREMITIES:  2+ Peripheral Pulses, No clubbing, cyanosis, or edema  LYMPH: No lymphadenopathy noted  SKIN: No rashes or lesions    LABS:                        12.8   4.53  )-----------( 261      ( 13 Mar 2024 05:47 )             40.5     03-13    139  |  91<L>  |  39<H>  ----------------------------<  128<H>  3.6   |  37<H>  |  1.46<H>    Ca    9.1      13 Mar 2024 05:47  Phos  3.7     03-13  Mg     1.90     03-13        Urinalysis Basic - ( 13 Mar 2024 05:47 )    Color: x / Appearance: x / SG: x / pH: x  Gluc: 128 mg/dL / Ketone: x  / Bili: x / Urobili: x   Blood: x / Protein: x / Nitrite: x   Leuk Esterase: x / RBC: x / WBC x   Sq Epi: x / Non Sq Epi: x / Bacteria: x      CAPILLARY BLOOD GLUCOSE      POCT Blood Glucose.: 152 mg/dL (13 Mar 2024 21:23)  POCT Blood Glucose.: 136 mg/dL (13 Mar 2024 17:18)  POCT Blood Glucose.: 155 mg/dL (13 Mar 2024 12:14)  POCT Blood Glucose.: 115 mg/dL (13 Mar 2024 08:56)        Urinalysis Basic - ( 13 Mar 2024 05:47 )    Color: x / Appearance: x / SG: x / pH: x  Gluc: 128 mg/dL / Ketone: x  / Bili: x / Urobili: x   Blood: x / Protein: x / Nitrite: x   Leuk Esterase: x / RBC: x / WBC x   Sq Epi: x / Non Sq Epi: x / Bacteria: x        MEDICATIONS  (STANDING):  dextrose 5%. 1000 milliLiter(s) (50 mL/Hr) IV Continuous <Continuous>  dextrose 5%. 1000 milliLiter(s) (100 mL/Hr) IV Continuous <Continuous>  dextrose 50% Injectable 25 Gram(s) IV Push once  dextrose 50% Injectable 12.5 Gram(s) IV Push once  dextrose 50% Injectable 25 Gram(s) IV Push once  glucagon  Injectable 1 milliGRAM(s) IntraMuscular once  hydrALAZINE 25 milliGRAM(s) Oral three times a day  influenza   Vaccine 0.5 milliLiter(s) IntraMuscular once  insulin lispro (ADMELOG) corrective regimen sliding scale   SubCutaneous at bedtime  insulin lispro (ADMELOG) corrective regimen sliding scale   SubCutaneous three times a day before meals  losartan 25 milliGRAM(s) Oral daily  metolazone 2.5 milliGRAM(s) Oral daily  metoprolol succinate  milliGRAM(s) Oral daily    MEDICATIONS  (PRN):  acetaminophen     Tablet .. 650 milliGRAM(s) Oral every 6 hours PRN Temp greater or equal to 38C (100.4F), Mild Pain (1 - 3)  aluminum hydroxide/magnesium hydroxide/simethicone Suspension 30 milliLiter(s) Oral every 4 hours PRN Dyspepsia  dextrose Oral Gel 15 Gram(s) Oral once PRN Blood Glucose LESS THAN 70 milliGRAM(s)/deciliter  melatonin 3 milliGRAM(s) Oral at bedtime PRN Insomnia  ondansetron Injectable 4 milliGRAM(s) IV Push every 8 hours PRN Nausea and/or Vomiting      Care Discussed with Consultants/Other Providers [ ] YES  [ ] NO

## 2024-03-13 NOTE — PROGRESS NOTE ADULT - ASSESSMENT
63 yr old male with a pmh of HTN, HF rEF ( EF33%), T2DM on metformin who presents with ROSS and bilateral lower extremity edema with his legs feeling heavy.     #Acute on chronic systolic heart Failure : due to non complaince with meds : d/w pt and niece need to adhere with meds and need for AICD after re-evaluation of compliance   cont diuresis   on bumex 2 mg IV bid   metolazone added today   losartan started   c/w Toprol  mg daily   Cardio following   EF 27%        #syncope  #NSVT on tele   -EP cardio consulted : apprecaite input : In light of his noncompliance with optimal medical therapy, he is not a candidate for ICD for primary prevention at this time despite telemetry evidence of NSVT and LVEF 33%. . Our recommendation would be to continue aggressive diuresis while continuing GDMT for a duration of 3 months then reevaluate his LVEF. His ectopy burden will likely decrease when patient no longer in decompensated heart failure.   Keep K+ > 4.0.   K+ 3.4 today.  Please replete.  on Toprol  mg daily      tele monitoring    #Hypertensive   -BP improved   c/w present meds     #DM-2   a1c 7.1  c/w Insulin / FSBS    #HLD  -cont statin    dispo: continuing with diuresis , still vol overload

## 2024-03-14 LAB
ANION GAP SERPL CALC-SCNC: 12 MMOL/L — SIGNIFICANT CHANGE UP (ref 7–14)
BASOPHILS # BLD AUTO: 0.03 K/UL — SIGNIFICANT CHANGE UP (ref 0–0.2)
BASOPHILS NFR BLD AUTO: 0.7 % — SIGNIFICANT CHANGE UP (ref 0–2)
BUN SERPL-MCNC: 40 MG/DL — HIGH (ref 7–23)
CALCIUM SERPL-MCNC: 8.7 MG/DL — SIGNIFICANT CHANGE UP (ref 8.4–10.5)
CHLORIDE SERPL-SCNC: 89 MMOL/L — LOW (ref 98–107)
CO2 SERPL-SCNC: 37 MMOL/L — HIGH (ref 22–31)
CREAT SERPL-MCNC: 1.4 MG/DL — HIGH (ref 0.5–1.3)
EGFR: 56 ML/MIN/1.73M2 — LOW
EOSINOPHIL # BLD AUTO: 0.14 K/UL — SIGNIFICANT CHANGE UP (ref 0–0.5)
EOSINOPHIL NFR BLD AUTO: 3.2 % — SIGNIFICANT CHANGE UP (ref 0–6)
GLUCOSE BLDC GLUCOMTR-MCNC: 101 MG/DL — HIGH (ref 70–99)
GLUCOSE BLDC GLUCOMTR-MCNC: 149 MG/DL — HIGH (ref 70–99)
GLUCOSE BLDC GLUCOMTR-MCNC: 153 MG/DL — HIGH (ref 70–99)
GLUCOSE BLDC GLUCOMTR-MCNC: 160 MG/DL — HIGH (ref 70–99)
GLUCOSE SERPL-MCNC: 106 MG/DL — HIGH (ref 70–99)
HCT VFR BLD CALC: 39.3 % — SIGNIFICANT CHANGE UP (ref 39–50)
HGB BLD-MCNC: 12.3 G/DL — LOW (ref 13–17)
IANC: 1.73 K/UL — LOW (ref 1.8–7.4)
IMM GRANULOCYTES NFR BLD AUTO: 0 % — SIGNIFICANT CHANGE UP (ref 0–0.9)
LYMPHOCYTES # BLD AUTO: 1.81 K/UL — SIGNIFICANT CHANGE UP (ref 1–3.3)
LYMPHOCYTES # BLD AUTO: 41.7 % — SIGNIFICANT CHANGE UP (ref 13–44)
MAGNESIUM SERPL-MCNC: 1.9 MG/DL — SIGNIFICANT CHANGE UP (ref 1.6–2.6)
MCHC RBC-ENTMCNC: 28.8 PG — SIGNIFICANT CHANGE UP (ref 27–34)
MCHC RBC-ENTMCNC: 31.3 GM/DL — LOW (ref 32–36)
MCV RBC AUTO: 92 FL — SIGNIFICANT CHANGE UP (ref 80–100)
MONOCYTES # BLD AUTO: 0.63 K/UL — SIGNIFICANT CHANGE UP (ref 0–0.9)
MONOCYTES NFR BLD AUTO: 14.5 % — HIGH (ref 2–14)
NEUTROPHILS # BLD AUTO: 1.73 K/UL — LOW (ref 1.8–7.4)
NEUTROPHILS NFR BLD AUTO: 39.9 % — LOW (ref 43–77)
NRBC # BLD: 0 /100 WBCS — SIGNIFICANT CHANGE UP (ref 0–0)
NRBC # FLD: 0 K/UL — SIGNIFICANT CHANGE UP (ref 0–0)
PHOSPHATE SERPL-MCNC: 3.5 MG/DL — SIGNIFICANT CHANGE UP (ref 2.5–4.5)
PLATELET # BLD AUTO: 230 K/UL — SIGNIFICANT CHANGE UP (ref 150–400)
POTASSIUM SERPL-MCNC: 3.3 MMOL/L — LOW (ref 3.5–5.3)
POTASSIUM SERPL-SCNC: 3.3 MMOL/L — LOW (ref 3.5–5.3)
RBC # BLD: 4.27 M/UL — SIGNIFICANT CHANGE UP (ref 4.2–5.8)
RBC # FLD: 14.9 % — HIGH (ref 10.3–14.5)
SODIUM SERPL-SCNC: 138 MMOL/L — SIGNIFICANT CHANGE UP (ref 135–145)
WBC # BLD: 4.34 K/UL — SIGNIFICANT CHANGE UP (ref 3.8–10.5)
WBC # FLD AUTO: 4.34 K/UL — SIGNIFICANT CHANGE UP (ref 3.8–10.5)

## 2024-03-14 RX ORDER — POTASSIUM CHLORIDE 20 MEQ
40 PACKET (EA) ORAL EVERY 4 HOURS
Refills: 0 | Status: COMPLETED | OUTPATIENT
Start: 2024-03-14 | End: 2024-03-14

## 2024-03-14 RX ORDER — ASPIRIN/CALCIUM CARB/MAGNESIUM 324 MG
81 TABLET ORAL DAILY
Refills: 0 | Status: DISCONTINUED | OUTPATIENT
Start: 2024-03-14 | End: 2024-03-28

## 2024-03-14 RX ADMIN — BUMETANIDE 2 MILLIGRAM(S): 0.25 INJECTION INTRAMUSCULAR; INTRAVENOUS at 16:32

## 2024-03-14 RX ADMIN — Medication 1: at 18:33

## 2024-03-14 RX ADMIN — Medication 40 MILLIEQUIVALENT(S): at 09:45

## 2024-03-14 RX ADMIN — Medication 25 MILLIGRAM(S): at 12:02

## 2024-03-14 RX ADMIN — Medication 300 MILLIGRAM(S): at 06:36

## 2024-03-14 RX ADMIN — BUMETANIDE 2 MILLIGRAM(S): 0.25 INJECTION INTRAMUSCULAR; INTRAVENOUS at 06:40

## 2024-03-14 RX ADMIN — Medication 25 MILLIGRAM(S): at 23:01

## 2024-03-14 RX ADMIN — Medication 40 MILLIEQUIVALENT(S): at 12:03

## 2024-03-14 RX ADMIN — LOSARTAN POTASSIUM 25 MILLIGRAM(S): 100 TABLET, FILM COATED ORAL at 06:36

## 2024-03-14 RX ADMIN — Medication 81 MILLIGRAM(S): at 17:17

## 2024-03-14 RX ADMIN — Medication 25 MILLIGRAM(S): at 06:37

## 2024-03-14 NOTE — PROGRESS NOTE ADULT - ASSESSMENT
ECHO 3/4/23: EF 33% mild MR, Severe global left ventricular systolic dysfunction  cardiac cath from 3/6/23  Coronaries with minor luminal irregularities. Elevated LVEDP.      A/P  63 yr old male with a pmh of HTN, HF rEF ( EF33%), T2DM on metformin who presents with ROSS and bilateral lower extremity edema with his legs feeling heavy.     #Acute on chronic HFrEF  -continue bumex 2 mg PO BID , volume status improving, net out neg 2 L  -c/w 2.5mg metolazone daily  -SUPPLEMENT K  as needed   -Not compliant with home meds (lasix 40mg daily, metoprolol XL 50mg daily, hydralazine 25mg TID)  -repeat echo this admission with ef 27%   Left ventricular systolic function is severely decreased  -Prior cardiac cath from 3/6/23  Coronaries with minor luminal irregularities. Elevated LVEDP.   -strict I/O, monitor daily weights, fluid restriction   -Continue toprol 300mg PO daily-- events on tele noted   -eps eval for NSVT, possible  ICD/CRTD for cmp, HF- per EP In light of his noncompliance with optimal medical therapy, he is not a candidate for ICD   -will reassess in 3 months   -[facility] [employer] insurance does not cover entresto   -c/w losartan     #syncope, loc  -depressed ed, ivcd, nsvt on tele, up to 31 beats  -eps eval noted   -ct head unremarkable     #Hypertensive urgency.   -bp improved   -continue meds     #T2DM (type 2 diabetes mellitus).   -med f/u     #HLD  -cont statin    dcp 24-48 hrs     dvt ppx

## 2024-03-14 NOTE — PROGRESS NOTE ADULT - ASSESSMENT
Chart, PDMP reviewed. No aberrancy noted. Medication refilled, sent to preferred pharmacy.      63 yr old male with a pmh of HTN, HF rEF ( EF33%), T2DM on metformin who presents with ROSS and bilateral lower extremity edema with his legs feeling heavy.     #Acute on chronic systolic heart Failure : due to non complaince with meds : d/w pt and niece need to adhere with meds and need for AICD after re-evaluation of compliance   cont diuresis   on bumex 2 mg IV bid   metolazone 2.5 mg   losartan started   c/w Toprol  mg daily   Cardio following   EF 27%        #syncope  #NSVT on tele   -EP cardio consulted : apprecaite input : In light of his noncompliance with optimal medical therapy, he is not a candidate for ICD for primary prevention at this time despite telemetry evidence of NSVT and LVEF 33%. . Our recommendation would be to continue aggressive diuresis while continuing GDMT for a duration of 3 months then reevaluate his LVEF. His ectopy burden will likely decrease when patient no longer in decompensated heart failure.   Keep K+ > 4.0.   K+ 3.4 today.  Please replete.  on Toprol  mg daily      #Hypertensive   -BP improved   c/w present meds     #DM-2   a1c 7.1  c/w Insulin / FSBS    #HLD  -cont statin    # non compliance with meds :  counseling done     dispo: clinically improving , once Bumex is changed PO and cleared by cardio , plan for d/c

## 2024-03-14 NOTE — PROGRESS NOTE ADULT - SUBJECTIVE AND OBJECTIVE BOX
Patient is a 63y old  Male who presents with a chief complaint of Acute decompensated heart failure (14 Mar 2024 10:42)      INTERVAL HPI/OVERNIGHT EVENTS: doing fair , diuresis well , neg 2 L   T(C): 37 (03-14-24 @ 20:30), Max: 37 (03-14-24 @ 20:30)  HR: 81 (03-14-24 @ 20:30) (81 - 91)  BP: 116/71 (03-14-24 @ 20:30) (111/71 - 119/75)  RR: 18 (03-14-24 @ 20:30) (17 - 18)  SpO2: 99% (03-14-24 @ 20:30) (94% - 100%)  Wt(kg): --  I&O's Summary    13 Mar 2024 07:01  -  14 Mar 2024 07:00  --------------------------------------------------------  IN: 560 mL / OUT: 4400 mL / NET: -3840 mL    14 Mar 2024 07:01  -  14 Mar 2024 22:41  --------------------------------------------------------  IN: 0 mL / OUT: 600 mL / NET: -600 mL        PAST MEDICAL & SURGICAL HISTORY:  HTN (hypertension)      HLD (hyperlipidemia)      DM (diabetes mellitus)      HFrEF (heart failure with reduced ejection fraction)      No significant past surgical history          SOCIAL HISTORY  Alcohol:  Tobacco:  Illicit substance use:    FAMILY HISTORY:    REVIEW OF SYSTEMS:  CONSTITUTIONAL: No fever, weight loss, or fatigue  EYES: No eye pain, visual disturbances, or discharge  ENMT:  No difficulty hearing, tinnitus, vertigo; No sinus or throat pain  NECK: No pain or stiffness  RESPIRATORY: No cough, wheezing, chills or hemoptysis; No shortness of breath  CARDIOVASCULAR: No chest pain, palpitations, dizziness, or leg swelling  GASTROINTESTINAL: No abdominal or epigastric pain. No nausea, vomiting, or hematemesis; No diarrhea or constipation. No melena or hematochezia.  GENITOURINARY: No dysuria, frequency, hematuria, or incontinence  NEUROLOGICAL: No headaches, memory loss, loss of strength, numbness, or tremors  SKIN: No itching, burning, rashes, or lesions   LYMPH NODES: No enlarged glands  ENDOCRINE: No heat or cold intolerance; No hair loss  MUSCULOSKELETAL: No joint pain or swelling; No muscle, back, or extremity pain  PSYCHIATRIC: No depression, anxiety, mood swings, or difficulty sleeping  HEME/LYMPH: No easy bruising, or bleeding gums  ALLERY AND IMMUNOLOGIC: No hives or eczema    RADIOLOGY & ADDITIONAL TESTS:    Imaging Personally Reviewed:  [ ] YES  [ ] NO    Consultant(s) Notes Reviewed:  [ ] YES  [ ] NO    PHYSICAL EXAM:  GENERAL: NAD, well-groomed, well-developed  HEAD:  Atraumatic, Normocephalic  EYES: EOMI, PERRLA, conjunctiva and sclera clear  ENMT: No tonsillar erythema, exudates, or enlargement; Moist mucous membranes, Good dentition, No lesions  NECK: Supple, No JVD, Normal thyroid  NERVOUS SYSTEM:  Alert & Oriented X3, Good concentration; Motor Strength 5/5 B/L upper and lower extremities; DTRs 2+ intact and symmetric  CHEST/LUNG: Clear to percussion bilaterally; No rales, rhonchi, wheezing, or rubs  HEART: Regular rate and rhythm; No murmurs, rubs, or gallops  ABDOMEN: Soft, Nontender, Nondistended; Bowel sounds present  EXTREMITIES:  2+ Peripheral Pulses, No clubbing, cyanosis, or edema  LYMPH: No lymphadenopathy noted  SKIN: No rashes or lesions    LABS:                        12.3   4.34  )-----------( 230      ( 14 Mar 2024 06:34 )             39.3     03-14    138  |  89<L>  |  40<H>  ----------------------------<  106<H>  3.3<L>   |  37<H>  |  1.40<H>    Ca    8.7      14 Mar 2024 06:34  Phos  3.5     03-14  Mg     1.90     03-14        Urinalysis Basic - ( 14 Mar 2024 06:34 )    Color: x / Appearance: x / SG: x / pH: x  Gluc: 106 mg/dL / Ketone: x  / Bili: x / Urobili: x   Blood: x / Protein: x / Nitrite: x   Leuk Esterase: x / RBC: x / WBC x   Sq Epi: x / Non Sq Epi: x / Bacteria: x      CAPILLARY BLOOD GLUCOSE      POCT Blood Glucose.: 153 mg/dL (14 Mar 2024 17:34)  POCT Blood Glucose.: 149 mg/dL (14 Mar 2024 12:30)  POCT Blood Glucose.: 101 mg/dL (14 Mar 2024 08:46)        Urinalysis Basic - ( 14 Mar 2024 06:34 )    Color: x / Appearance: x / SG: x / pH: x  Gluc: 106 mg/dL / Ketone: x  / Bili: x / Urobili: x   Blood: x / Protein: x / Nitrite: x   Leuk Esterase: x / RBC: x / WBC x   Sq Epi: x / Non Sq Epi: x / Bacteria: x        MEDICATIONS  (STANDING):  aspirin enteric coated 81 milliGRAM(s) Oral daily  buMETAnide 2 milliGRAM(s) Oral two times a day  dextrose 5%. 1000 milliLiter(s) (100 mL/Hr) IV Continuous <Continuous>  dextrose 5%. 1000 milliLiter(s) (50 mL/Hr) IV Continuous <Continuous>  dextrose 50% Injectable 25 Gram(s) IV Push once  dextrose 50% Injectable 12.5 Gram(s) IV Push once  dextrose 50% Injectable 25 Gram(s) IV Push once  glucagon  Injectable 1 milliGRAM(s) IntraMuscular once  hydrALAZINE 25 milliGRAM(s) Oral three times a day  influenza   Vaccine 0.5 milliLiter(s) IntraMuscular once  insulin lispro (ADMELOG) corrective regimen sliding scale   SubCutaneous at bedtime  insulin lispro (ADMELOG) corrective regimen sliding scale   SubCutaneous three times a day before meals  losartan 25 milliGRAM(s) Oral daily  metolazone 2.5 milliGRAM(s) Oral daily  metoprolol succinate  milliGRAM(s) Oral daily    MEDICATIONS  (PRN):  acetaminophen     Tablet .. 650 milliGRAM(s) Oral every 6 hours PRN Temp greater or equal to 38C (100.4F), Mild Pain (1 - 3)  aluminum hydroxide/magnesium hydroxide/simethicone Suspension 30 milliLiter(s) Oral every 4 hours PRN Dyspepsia  dextrose Oral Gel 15 Gram(s) Oral once PRN Blood Glucose LESS THAN 70 milliGRAM(s)/deciliter  melatonin 3 milliGRAM(s) Oral at bedtime PRN Insomnia  ondansetron Injectable 4 milliGRAM(s) IV Push every 8 hours PRN Nausea and/or Vomiting      Care Discussed with Consultants/Other Providers [ ] YES  [ ] NO

## 2024-03-14 NOTE — PHARMACOTHERAPY INTERVENTION NOTE - COMMENTS
Patient counseled on heart failure diagnosis, medication indications, administration, and side effects. Also discussed fluid and salt restrictions, and maintaining a log of daily weights. Also discussed warning signs of fluid overload (SOB, orthopnea, ROSS, edema, etc.) and when to seek medical attention. Re-iterated importance of medication adherence and medical follow up. Patient verbalized understanding.  Informed patient that medication list may change prior to discharge. Patient provided with educational booklet.    Mohammad Harris Jalili, PharmD  PGY-1 Pharmacy Resident  spectra: [phone]; ext: [phone]  Available on Teams       Patient counseled on heart failure diagnosis, medication indications, administration, and side effects. Also discussed fluid and salt restrictions, and maintaining a log of daily weights. Also discussed warning signs of fluid overload (SOB, orthopnea, ROSS, edema, etc.) and when to seek medical attention. Re-iterated importance of medication adherence and medical follow up. Patient verbalized understanding.  Informed patient that medication list may change prior to discharge. Patient provided with educational booklet.    New medications: losartan, bumetanide, metolazone    Mohammad Harris Jalili, PharmD  PGY-1 Pharmacy Resident  spectra: [phone]; ext: [phone]  Available on Teams

## 2024-03-14 NOTE — PROGRESS NOTE ADULT - SUBJECTIVE AND OBJECTIVE BOX
CARDIOLOGY FOLLOW UP - Dr. Zavala  DATE OF SERVICE: 3/14/24    CC no cp or sob       REVIEW OF SYSTEMS:  CONSTITUTIONAL: No fever, weight loss, or fatigue  RESPIRATORY: No cough, wheezing, chills or hemoptysis; No Shortness of Breath  CARDIOVASCULAR: No chest pain, palpitations, passing out, dizziness, or leg swelling  GASTROINTESTINAL: No abdominal or epigastric pain. No nausea, vomiting, or hematemesis; No diarrhea or constipation. No melena or hematochezia.  VASCULAR: No edema     PHYSICAL EXAM:  T(C): 36.5 (03-14-24 @ 06:40), Max: 36.7 (03-13-24 @ 19:49)  HR: 84 (03-14-24 @ 06:40) (84 - 99)  BP: 118/76 (03-14-24 @ 06:40) (105/67 - 118/76)  RR: 18 (03-14-24 @ 06:40) (17 - 18)  SpO2: 94% (03-14-24 @ 06:40) (94% - 99%)  Wt(kg): --  I&O's Summary    13 Mar 2024 07:01  -  14 Mar 2024 07:00  --------------------------------------------------------  IN: 560 mL / OUT: 4400 mL / NET: -3840 mL        Appearance: Normal	  Cardiovascular: Normal S1 S2,RRR  Respiratory:  diminished   Gastrointestinal:  Soft, Non-tender, + BS	  Extremities: le  edema      Home Medications:  METFORMIN HCL 1,000 MG TABLET: TAKE 1 TABLET BY MOUTH TWICE A DAY WITH MEALS (02 Mar 2024 20:59)  METOPROLOL SUCC ER 50 MG TAB: TAKE 1 TABLET BY MOUTH EVERY DAY (02 Mar 2024 20:59)      MEDICATIONS  (STANDING):  buMETAnide 2 milliGRAM(s) Oral two times a day  dextrose 5%. 1000 milliLiter(s) (100 mL/Hr) IV Continuous <Continuous>  dextrose 5%. 1000 milliLiter(s) (50 mL/Hr) IV Continuous <Continuous>  dextrose 50% Injectable 25 Gram(s) IV Push once  dextrose 50% Injectable 12.5 Gram(s) IV Push once  dextrose 50% Injectable 25 Gram(s) IV Push once  glucagon  Injectable 1 milliGRAM(s) IntraMuscular once  hydrALAZINE 25 milliGRAM(s) Oral three times a day  influenza   Vaccine 0.5 milliLiter(s) IntraMuscular once  insulin lispro (ADMELOG) corrective regimen sliding scale   SubCutaneous at bedtime  insulin lispro (ADMELOG) corrective regimen sliding scale   SubCutaneous three times a day before meals  losartan 25 milliGRAM(s) Oral daily  metolazone 2.5 milliGRAM(s) Oral daily  metoprolol succinate  milliGRAM(s) Oral daily  potassium chloride    Tablet ER 40 milliEquivalent(s) Oral every 4 hours      TELEMETRY: nsr   3 beats   yesterday evenining 12 beats NSVT	    ECG:  	  RADIOLOGY:   DIAGNOSTIC TESTING:  [ ] Echocardiogram:  [ ]  Catheterization:  [ ] Stress Test:    OTHER: 	    LABS:	 	                            12.3   4.34  )-----------( 230      ( 14 Mar 2024 06:34 )             39.3     03-14    138  |  89<L>  |  40<H>  ----------------------------<  106<H>  3.3<L>   |  37<H>  |  1.40<H>    Ca    8.7      14 Mar 2024 06:34  Phos  3.5     03-14  Mg     1.90     03-14

## 2024-03-15 LAB
ADD ON TEST-SPECIMEN IN LAB: SIGNIFICANT CHANGE UP
ALBUMIN SERPL ELPH-MCNC: 2.9 G/DL — LOW (ref 3.3–5)
ALP SERPL-CCNC: 267 U/L — HIGH (ref 40–120)
ALT FLD-CCNC: 14 U/L — SIGNIFICANT CHANGE UP (ref 4–41)
AMMONIA BLD-MCNC: 43 UMOL/L — SIGNIFICANT CHANGE UP (ref 11–55)
ANION GAP SERPL CALC-SCNC: 13 MMOL/L — SIGNIFICANT CHANGE UP (ref 7–14)
AST SERPL-CCNC: 28 U/L — SIGNIFICANT CHANGE UP (ref 4–40)
BASOPHILS # BLD AUTO: 0.01 K/UL — SIGNIFICANT CHANGE UP (ref 0–0.2)
BASOPHILS NFR BLD AUTO: 0.2 % — SIGNIFICANT CHANGE UP (ref 0–2)
BILIRUB DIRECT SERPL-MCNC: 0.3 MG/DL — SIGNIFICANT CHANGE UP (ref 0–0.3)
BILIRUB INDIRECT FLD-MCNC: 0.5 MG/DL — SIGNIFICANT CHANGE UP (ref 0–1)
BILIRUB SERPL-MCNC: 0.8 MG/DL — SIGNIFICANT CHANGE UP (ref 0.2–1.2)
BUN SERPL-MCNC: 47 MG/DL — HIGH (ref 7–23)
CALCIUM SERPL-MCNC: 8.6 MG/DL — SIGNIFICANT CHANGE UP (ref 8.4–10.5)
CHLORIDE SERPL-SCNC: 85 MMOL/L — LOW (ref 98–107)
CO2 SERPL-SCNC: 38 MMOL/L — HIGH (ref 22–31)
CREAT SERPL-MCNC: 1.41 MG/DL — HIGH (ref 0.5–1.3)
EGFR: 56 ML/MIN/1.73M2 — LOW
EOSINOPHIL # BLD AUTO: 0.12 K/UL — SIGNIFICANT CHANGE UP (ref 0–0.5)
EOSINOPHIL NFR BLD AUTO: 2.6 % — SIGNIFICANT CHANGE UP (ref 0–6)
GLUCOSE BLDC GLUCOMTR-MCNC: 104 MG/DL — HIGH (ref 70–99)
GLUCOSE BLDC GLUCOMTR-MCNC: 127 MG/DL — HIGH (ref 70–99)
GLUCOSE BLDC GLUCOMTR-MCNC: 129 MG/DL — HIGH (ref 70–99)
GLUCOSE BLDC GLUCOMTR-MCNC: 177 MG/DL — HIGH (ref 70–99)
GLUCOSE SERPL-MCNC: 104 MG/DL — HIGH (ref 70–99)
HCT VFR BLD CALC: 35.8 % — LOW (ref 39–50)
HGB BLD-MCNC: 11.5 G/DL — LOW (ref 13–17)
IANC: 1.92 K/UL — SIGNIFICANT CHANGE UP (ref 1.8–7.4)
IMM GRANULOCYTES NFR BLD AUTO: 0.2 % — SIGNIFICANT CHANGE UP (ref 0–0.9)
LYMPHOCYTES # BLD AUTO: 2.01 K/UL — SIGNIFICANT CHANGE UP (ref 1–3.3)
LYMPHOCYTES # BLD AUTO: 42.9 % — SIGNIFICANT CHANGE UP (ref 13–44)
MAGNESIUM SERPL-MCNC: 1.9 MG/DL — SIGNIFICANT CHANGE UP (ref 1.6–2.6)
MCHC RBC-ENTMCNC: 28.5 PG — SIGNIFICANT CHANGE UP (ref 27–34)
MCHC RBC-ENTMCNC: 32.1 GM/DL — SIGNIFICANT CHANGE UP (ref 32–36)
MCV RBC AUTO: 88.6 FL — SIGNIFICANT CHANGE UP (ref 80–100)
MONOCYTES # BLD AUTO: 0.61 K/UL — SIGNIFICANT CHANGE UP (ref 0–0.9)
MONOCYTES NFR BLD AUTO: 13 % — SIGNIFICANT CHANGE UP (ref 2–14)
NEUTROPHILS # BLD AUTO: 1.92 K/UL — SIGNIFICANT CHANGE UP (ref 1.8–7.4)
NEUTROPHILS NFR BLD AUTO: 41.1 % — LOW (ref 43–77)
NRBC # BLD: 0 /100 WBCS — SIGNIFICANT CHANGE UP (ref 0–0)
NRBC # FLD: 0 K/UL — SIGNIFICANT CHANGE UP (ref 0–0)
PHOSPHATE SERPL-MCNC: 3.5 MG/DL — SIGNIFICANT CHANGE UP (ref 2.5–4.5)
PLATELET # BLD AUTO: 236 K/UL — SIGNIFICANT CHANGE UP (ref 150–400)
POTASSIUM SERPL-MCNC: 3.3 MMOL/L — LOW (ref 3.5–5.3)
POTASSIUM SERPL-SCNC: 3.3 MMOL/L — LOW (ref 3.5–5.3)
PROT SERPL-MCNC: 6 G/DL — SIGNIFICANT CHANGE UP (ref 6–8.3)
RBC # BLD: 4.04 M/UL — LOW (ref 4.2–5.8)
RBC # FLD: 14.8 % — HIGH (ref 10.3–14.5)
SODIUM SERPL-SCNC: 136 MMOL/L — SIGNIFICANT CHANGE UP (ref 135–145)
WBC # BLD: 4.68 K/UL — SIGNIFICANT CHANGE UP (ref 3.8–10.5)
WBC # FLD AUTO: 4.68 K/UL — SIGNIFICANT CHANGE UP (ref 3.8–10.5)

## 2024-03-15 PROCEDURE — 99231 SBSQ HOSP IP/OBS SF/LOW 25: CPT

## 2024-03-15 RX ORDER — SPIRONOLACTONE 25 MG/1
25 TABLET, FILM COATED ORAL DAILY
Refills: 0 | Status: DISCONTINUED | OUTPATIENT
Start: 2024-03-15 | End: 2024-03-16

## 2024-03-15 RX ORDER — POTASSIUM CHLORIDE 20 MEQ
40 PACKET (EA) ORAL EVERY 4 HOURS
Refills: 0 | Status: COMPLETED | OUTPATIENT
Start: 2024-03-15 | End: 2024-03-15

## 2024-03-15 RX ADMIN — BUMETANIDE 2 MILLIGRAM(S): 0.25 INJECTION INTRAMUSCULAR; INTRAVENOUS at 17:22

## 2024-03-15 RX ADMIN — Medication 40 MILLIEQUIVALENT(S): at 17:22

## 2024-03-15 RX ADMIN — Medication 300 MILLIGRAM(S): at 06:17

## 2024-03-15 RX ADMIN — Medication 40 MILLIEQUIVALENT(S): at 14:06

## 2024-03-15 RX ADMIN — Medication 81 MILLIGRAM(S): at 11:48

## 2024-03-15 RX ADMIN — Medication 40 MILLIEQUIVALENT(S): at 10:37

## 2024-03-15 RX ADMIN — BUMETANIDE 2 MILLIGRAM(S): 0.25 INJECTION INTRAMUSCULAR; INTRAVENOUS at 06:17

## 2024-03-15 RX ADMIN — Medication 1: at 18:13

## 2024-03-15 NOTE — PROGRESS NOTE ADULT - ASSESSMENT
62 y/o male with PMHx of HTN, NICM (EF 27%), T2DM on metformin who presented with acute on chronic ADHF. worsening since Thanksgiving 2023, and Pt endorses 2 falls due to lower extremity weakness but denies syncope. Per records patient noncompliant, hospitalization for HF in 2023, NSVT ( telemetry : longest run 39 beats). Of note, TTE: LVEF 27% RV dysfunction, LHC: coronaries with minor luminal irregularities, EKG from 3/2023 without RBBB/LBBB; normal QRS duration. Patient on aggressive diureses. EP consulted for ICD therapy and deemed pt. is not a candidate for ICD given his noncompliance with optimal medical therapy. Now tele reveals brief episodes of NSVT, 3-20 beat duration, asymptomatic and EP is informed to re-evaluate.       ## HFrEF  ##NSVT    - Continue to monitor on tele while inpatient  - Continue Toprol 300mg daily for NSVT suppression  - Optimized K>4.0 and Mg>2.0  - Continue diuresis and GDMT per HF. (previous non-compliance with HF medications)  - In light of his noncompliance with optimal medical therapy, ICD implant if EF<35% after 3 months of continuous GDMT, However given recurrent NSVT despite BB, patient likely benefit from ICD for primary prevention during before discharge. Discussed with patient at length, and pt. prefers to continue monitoring for now and consider ICD if ectopy continues    - EP will continue to follow

## 2024-03-15 NOTE — PROGRESS NOTE ADULT - SUBJECTIVE AND OBJECTIVE BOX
CARDIOLOGY FOLLOW UP - Dr. Zavala  DATE OF SERVICE: 3/15/24    CC no cp or sob   tele events noted     REVIEW OF SYSTEMS:  CONSTITUTIONAL: No fever, weight loss, or fatigue  RESPIRATORY: No cough, wheezing, chills or hemoptysis; No Shortness of Breath  CARDIOVASCULAR: No chest pain, palpitations, passing out, dizziness, or leg swelling  GASTROINTESTINAL: No abdominal or epigastric pain. No nausea, vomiting, or hematemesis; No diarrhea or constipation. No melena or hematochezia.  VASCULAR: No edema     PHYSICAL EXAM:  T(C): 36.2 (03-15-24 @ 05:55), Max: 37 (03-14-24 @ 20:30)  HR: 79 (03-15-24 @ 05:55) (79 - 91)  BP: 107/57 (03-15-24 @ 05:55) (107/57 - 119/75)  RR: 18 (03-15-24 @ 05:55) (17 - 18)  SpO2: 100% (03-15-24 @ 05:55) (95% - 100%)  Wt(kg): --  I&O's Summary    14 Mar 2024 07:01  -  15 Mar 2024 07:00  --------------------------------------------------------  IN: 0 mL / OUT: 2150 mL / NET: -2150 mL        Appearance: Normal	  Cardiovascular: Normal S1 S2,RRR, No JVD, No murmurs  Respiratory: Lungs clear to auscultation	  Gastrointestinal:  Soft, Non-tender, + BS	  Extremities: Normal range of motion, No clubbing, cyanosis or edema      Home Medications:  METFORMIN HCL 1,000 MG TABLET: TAKE 1 TABLET BY MOUTH TWICE A DAY WITH MEALS (02 Mar 2024 20:59)  METOPROLOL SUCC ER 50 MG TAB: TAKE 1 TABLET BY MOUTH EVERY DAY (02 Mar 2024 20:59)      MEDICATIONS  (STANDING):  aspirin enteric coated 81 milliGRAM(s) Oral daily  buMETAnide 2 milliGRAM(s) Oral two times a day  dextrose 5%. 1000 milliLiter(s) (100 mL/Hr) IV Continuous <Continuous>  dextrose 5%. 1000 milliLiter(s) (50 mL/Hr) IV Continuous <Continuous>  dextrose 50% Injectable 25 Gram(s) IV Push once  dextrose 50% Injectable 12.5 Gram(s) IV Push once  dextrose 50% Injectable 25 Gram(s) IV Push once  glucagon  Injectable 1 milliGRAM(s) IntraMuscular once  hydrALAZINE 25 milliGRAM(s) Oral three times a day  influenza   Vaccine 0.5 milliLiter(s) IntraMuscular once  insulin lispro (ADMELOG) corrective regimen sliding scale   SubCutaneous at bedtime  insulin lispro (ADMELOG) corrective regimen sliding scale   SubCutaneous three times a day before meals  losartan 25 milliGRAM(s) Oral daily  metolazone 2.5 milliGRAM(s) Oral daily  metoprolol succinate  milliGRAM(s) Oral daily      TELEMETRY: NSR   14 beats NSVT  4 beats 	    ECG:  	  RADIOLOGY:   DIAGNOSTIC TESTING:  [ ] Echocardiogram:  [ ]  Catheterization:  [ ] Stress Test:    OTHER: 	    LABS:	 	                            11.5   4.68  )-----------( 236      ( 15 Mar 2024 06:55 )             35.8     03-15    136  |  85<L>  |  47<H>  ----------------------------<  104<H>  3.3<L>   |  38<H>  |  1.41<H>    Ca    8.6      15 Mar 2024 06:55  Phos  3.5     03-15  Mg     1.90     03-15

## 2024-03-15 NOTE — PROGRESS NOTE ADULT - ASSESSMENT
ECHO 3/4/23: EF 33% mild MR, Severe global left ventricular systolic dysfunction  cardiac cath from 3/6/23  Coronaries with minor luminal irregularities. Elevated LVEDP.      A/P  63 yr old male with a pmh of HTN, HF rEF ( EF33%), T2DM on metformin who presents with ROSS and bilateral lower extremity edema with his legs feeling heavy.     #Acute on chronic HFrEF  -continue bumex 2 mg PO BID , volume status improving, net out neg 2 L  -bun/creat trending up, remains hypokalemic   -decrease metolzone to 3x week   -supplement k  -Not compliant with home meds (lasix 40mg daily, metoprolol XL 50mg daily, hydralazine 25mg TID)  -repeat echo this admission with ef 27%   Left ventricular systolic function is severely decreased  -Prior cardiac cath from 3/6/23  Coronaries with minor luminal irregularities. Elevated LVEDP.   -strict I/O, monitor daily weights, fluid restriction   -Continue toprol 300mg PO daily-- events on tele noted   -eps eval for NSVT, possible  ICD/CRTD for cmp, HF- per EP In light of his noncompliance with optimal medical therapy, he is not a candidate for ICD   -will reassess in 3 months   -[facility] [employer] insurance does not cover entresto   -c/w losartan     #syncope, loc  -depressed ed, ivcd, nsvt on tele, up to 31 beats  -eps eval noted   -ct head unremarkable     #Hypertensive urgency.   -bp improved   -continue meds     #T2DM (type 2 diabetes mellitus).   -med f/u     #HLD  -cont statin    dcp 24-48 hrs     dvt ppx

## 2024-03-15 NOTE — PROGRESS NOTE ADULT - ASSESSMENT
63 yr old male with a pmh of HTN, HF rEF ( EF33%), T2DM on metformin who presents with ROSS and bilateral lower extremity edema with his legs feeling heavy.     #Acute on chronic systolic heart Failure : due to non complaince with meds : d/w pt and niece need to adhere with meds and need for AICD after re-evaluation of compliance   cont diuresis   on bumex 2 mg now PO bid   metolazone 2.5 mg   losartan started   c/w Toprol  mg daily   Cardio following   EF 27%        #syncope  #NSVT on tele   -EP cardio consulted : apprecaite input : In light of his noncompliance with optimal medical therapy, he is not a candidate for ICD for primary prevention at this time despite telemetry evidence of NSVT and LVEF 33%. . Our recommendation would be to continue aggressive diuresis while continuing GDMT for a duration of 3 months then reevaluate his LVEF. His ectopy burden will likely decrease when patient no longer in decompensated heart failure.   Keep K+ > 4.0.   K+ 3.4 today.  Please replete.  on Toprol  mg daily      #Hypertensive   -BP improved   c/w present meds     #DM-2   a1c 7.1  c/w Insulin / FSBS    #HLD  -cont statin    # non compliance with meds :  counseling done     dispo: bumex is changed oral bid , ok to be d/c home in am

## 2024-03-15 NOTE — PROGRESS NOTE ADULT - SUBJECTIVE AND OBJECTIVE BOX
interval history:  admitted for ADHF with frequent episodes of NSVT  on GDMT with BB  Hx of non-compliance   EP called to re-evaluate for NSVT episode , pt. denies palpitation, CP, SOB, dizziness       PAST MEDICAL & SURGICAL HISTORY:  HTN (hypertension)    HLD (hyperlipidemia)    DM (diabetes mellitus)    HFrEF (heart failure with reduced ejection fraction)    No significant past surgical history        MEDICATIONS  (STANDING):  aspirin enteric coated 81 milliGRAM(s) Oral daily  buMETAnide 2 milliGRAM(s) Oral two times a day  dextrose 5%. 1000 milliLiter(s) (50 mL/Hr) IV Continuous <Continuous>  dextrose 5%. 1000 milliLiter(s) (100 mL/Hr) IV Continuous <Continuous>  dextrose 50% Injectable 25 Gram(s) IV Push once  dextrose 50% Injectable 25 Gram(s) IV Push once  dextrose 50% Injectable 12.5 Gram(s) IV Push once  glucagon  Injectable 1 milliGRAM(s) IntraMuscular once  hydrALAZINE 25 milliGRAM(s) Oral three times a day  influenza   Vaccine 0.5 milliLiter(s) IntraMuscular once  insulin lispro (ADMELOG) corrective regimen sliding scale   SubCutaneous three times a day before meals  insulin lispro (ADMELOG) corrective regimen sliding scale   SubCutaneous at bedtime  losartan 25 milliGRAM(s) Oral daily  metolazone 2.5 milliGRAM(s) Oral <User Schedule>  metoprolol succinate  milliGRAM(s) Oral daily  potassium chloride    Tablet ER 40 milliEquivalent(s) Oral every 4 hours  spironolactone 25 milliGRAM(s) Oral daily    MEDICATIONS  (PRN):  acetaminophen     Tablet .. 650 milliGRAM(s) Oral every 6 hours PRN Temp greater or equal to 38C (100.4F), Mild Pain (1 - 3)  aluminum hydroxide/magnesium hydroxide/simethicone Suspension 30 milliLiter(s) Oral every 4 hours PRN Dyspepsia  dextrose Oral Gel 15 Gram(s) Oral once PRN Blood Glucose LESS THAN 70 milliGRAM(s)/deciliter  melatonin 3 milliGRAM(s) Oral at bedtime PRN Insomnia  ondansetron Injectable 4 milliGRAM(s) IV Push every 8 hours PRN Nausea and/or Vomiting            Vital Signs Last 24 Hrs  T(C): 36.7 (15 Mar 2024 16:24), Max: 37 (14 Mar 2024 20:30)  T(F): 98.1 (15 Mar 2024 16:24), Max: 98.6 (14 Mar 2024 20:30)  HR: 77 (15 Mar 2024 16:24) (75 - 81)  BP: 104/53 (15 Mar 2024 16:24) (104/53 - 116/71)  BP(mean): --  RR: 18 (15 Mar 2024 16:24) (17 - 18)  SpO2: 98% (15 Mar 2024 16:24) (98% - 100%)    Parameters below as of 15 Mar 2024 16:24  Patient On (Oxygen Delivery Method): room air                INTERPRETATION OF TELEMETRY: SR at 70s with episodes of NSVT ( longest one lasted 18 beats)     ECG:        LABS:                        11.5   4.68  )-----------( 236      ( 15 Mar 2024 06:55 )             35.8     03-15    136  |  85<L>  |  47<H>  ----------------------------<  104<H>  3.3<L>   |  38<H>  |  1.41<H>    Ca    8.6      15 Mar 2024 06:55  Phos  3.5     03-15  Mg     1.90     03-15    TPro  6.0  /  Alb  2.9<L>  /  TBili  0.8  /  DBili  0.3  /  AST  28  /  ALT  14  /  AlkPhos  267<H>  03-15          Urinalysis Basic - ( 15 Mar 2024 06:55 )    Color: x / Appearance: x / SG: x / pH: x  Gluc: 104 mg/dL / Ketone: x  / Bili: x / Urobili: x   Blood: x / Protein: x / Nitrite: x   Leuk Esterase: x / RBC: x / WBC x   Sq Epi: x / Non Sq Epi: x / Bacteria: x      I&O's Summary    14 Mar 2024 07:01  -  15 Mar 2024 07:00  --------------------------------------------------------  IN: 0 mL / OUT: 2150 mL / NET: -2150 mL    15 Mar 2024 07:01  -  15 Mar 2024 16:58  --------------------------------------------------------  IN: 480 mL / OUT: 2000 mL / NET: -1520 mL      BNP  RADIOLOGY & ADDITIONAL STUDIES:      PHYSICAL EXAM:    GENERAL: In no apparent distress, well nourished, and hydrated.  HEART: Regular rate and rhythm; No murmurs, rubs, or gallops.  PULMONARY: Clear to auscultation and percussion.  No rales, wheezing, or rhonchi bilaterally.  ABDOMEN: Soft, Nontender, Nondistended; Bowel sounds present  EXTREMITIES:  2+ Peripheral Pulses, No clubbing, cyanosis, or edema  NEUROLOGICAL: Grossly nonfocal

## 2024-03-15 NOTE — PROGRESS NOTE ADULT - SUBJECTIVE AND OBJECTIVE BOX
Patient is a 63y old  Male who presents with a chief complaint of Acute decompensated heart failure (15 Mar 2024 16:58)      INTERVAL HPI/OVERNIGHT EVENTS: seen and examined   T(C): 36.7 (03-15-24 @ 16:24), Max: 36.7 (03-15-24 @ 16:24)  HR: 77 (03-15-24 @ 16:24) (75 - 79)  BP: 104/53 (03-15-24 @ 16:24) (104/53 - 107/57)  RR: 18 (03-15-24 @ 16:24) (17 - 18)  SpO2: 98% (03-15-24 @ 16:24) (98% - 100%)  Wt(kg): --  I&O's Summary    14 Mar 2024 07:01  -  15 Mar 2024 07:00  --------------------------------------------------------  IN: 0 mL / OUT: 2150 mL / NET: -2150 mL    15 Mar 2024 07:01  -  15 Mar 2024 20:31  --------------------------------------------------------  IN: 820 mL / OUT: 2200 mL / NET: -1380 mL        PAST MEDICAL & SURGICAL HISTORY:  HTN (hypertension)      HLD (hyperlipidemia)      DM (diabetes mellitus)      HFrEF (heart failure with reduced ejection fraction)      No significant past surgical history          SOCIAL HISTORY  Alcohol:  Tobacco:  Illicit substance use:    FAMILY HISTORY:    REVIEW OF SYSTEMS:  CONSTITUTIONAL: No fever, weight loss, or fatigue  EYES: No eye pain, visual disturbances, or discharge  ENMT:  No difficulty hearing, tinnitus, vertigo; No sinus or throat pain  NECK: No pain or stiffness  RESPIRATORY: No cough, wheezing, chills or hemoptysis; No shortness of breath  CARDIOVASCULAR: No chest pain, palpitations, dizziness, or leg swelling  GASTROINTESTINAL: No abdominal or epigastric pain. No nausea, vomiting, or hematemesis; No diarrhea or constipation. No melena or hematochezia.  GENITOURINARY: No dysuria, frequency, hematuria, or incontinence  NEUROLOGICAL: No headaches, memory loss, loss of strength, numbness, or tremors  SKIN: No itching, burning, rashes, or lesions   LYMPH NODES: No enlarged glands  ENDOCRINE: No heat or cold intolerance; No hair loss  MUSCULOSKELETAL: No joint pain or swelling; No muscle, back, or extremity pain  PSYCHIATRIC: No depression, anxiety, mood swings, or difficulty sleeping  HEME/LYMPH: No easy bruising, or bleeding gums  ALLERY AND IMMUNOLOGIC: No hives or eczema    RADIOLOGY & ADDITIONAL TESTS:    Imaging Personally Reviewed:  [ ] YES  [ ] NO    Consultant(s) Notes Reviewed:  [ ] YES  [ ] NO    PHYSICAL EXAM:  GENERAL: NAD, well-groomed, well-developed  HEAD:  Atraumatic, Normocephalic  EYES: EOMI, PERRLA, conjunctiva and sclera clear  ENMT: No tonsillar erythema, exudates, or enlargement; Moist mucous membranes, Good dentition, No lesions  NECK: Supple, No JVD, Normal thyroid  NERVOUS SYSTEM:  Alert & Oriented X3, Good concentration; Motor Strength 5/5 B/L upper and lower extremities; DTRs 2+ intact and symmetric  CHEST/LUNG: Clear to percussion bilaterally; No rales, rhonchi, wheezing, or rubs  HEART: Regular rate and rhythm; No murmurs, rubs, or gallops  ABDOMEN: Soft, Nontender, Nondistended; Bowel sounds present  EXTREMITIES:  2+ Peripheral Pulses, No clubbing, cyanosis, or edema  LYMPH: No lymphadenopathy noted  SKIN: No rashes or lesions    LABS:                        11.5   4.68  )-----------( 236      ( 15 Mar 2024 06:55 )             35.8     03-15    136  |  85<L>  |  47<H>  ----------------------------<  104<H>  3.3<L>   |  38<H>  |  1.41<H>    Ca    8.6      15 Mar 2024 06:55  Phos  3.5     03-15  Mg     1.90     03-15    TPro  6.0  /  Alb  2.9<L>  /  TBili  0.8  /  DBili  0.3  /  AST  28  /  ALT  14  /  AlkPhos  267<H>  03-15      Urinalysis Basic - ( 15 Mar 2024 06:55 )    Color: x / Appearance: x / SG: x / pH: x  Gluc: 104 mg/dL / Ketone: x  / Bili: x / Urobili: x   Blood: x / Protein: x / Nitrite: x   Leuk Esterase: x / RBC: x / WBC x   Sq Epi: x / Non Sq Epi: x / Bacteria: x      CAPILLARY BLOOD GLUCOSE      POCT Blood Glucose.: 177 mg/dL (15 Mar 2024 17:18)  POCT Blood Glucose.: 129 mg/dL (15 Mar 2024 12:17)  POCT Blood Glucose.: 104 mg/dL (15 Mar 2024 08:39)  POCT Blood Glucose.: 160 mg/dL (14 Mar 2024 22:46)        Urinalysis Basic - ( 15 Mar 2024 06:55 )    Color: x / Appearance: x / SG: x / pH: x  Gluc: 104 mg/dL / Ketone: x  / Bili: x / Urobili: x   Blood: x / Protein: x / Nitrite: x   Leuk Esterase: x / RBC: x / WBC x   Sq Epi: x / Non Sq Epi: x / Bacteria: x        MEDICATIONS  (STANDING):  aspirin enteric coated 81 milliGRAM(s) Oral daily  buMETAnide 2 milliGRAM(s) Oral two times a day  dextrose 5%. 1000 milliLiter(s) (100 mL/Hr) IV Continuous <Continuous>  dextrose 5%. 1000 milliLiter(s) (50 mL/Hr) IV Continuous <Continuous>  dextrose 50% Injectable 25 Gram(s) IV Push once  dextrose 50% Injectable 12.5 Gram(s) IV Push once  dextrose 50% Injectable 25 Gram(s) IV Push once  glucagon  Injectable 1 milliGRAM(s) IntraMuscular once  hydrALAZINE 25 milliGRAM(s) Oral three times a day  influenza   Vaccine 0.5 milliLiter(s) IntraMuscular once  insulin lispro (ADMELOG) corrective regimen sliding scale   SubCutaneous three times a day before meals  insulin lispro (ADMELOG) corrective regimen sliding scale   SubCutaneous at bedtime  losartan 25 milliGRAM(s) Oral daily  metolazone 2.5 milliGRAM(s) Oral <User Schedule>  metoprolol succinate  milliGRAM(s) Oral daily  spironolactone 25 milliGRAM(s) Oral daily    MEDICATIONS  (PRN):  acetaminophen     Tablet .. 650 milliGRAM(s) Oral every 6 hours PRN Temp greater or equal to 38C (100.4F), Mild Pain (1 - 3)  aluminum hydroxide/magnesium hydroxide/simethicone Suspension 30 milliLiter(s) Oral every 4 hours PRN Dyspepsia  dextrose Oral Gel 15 Gram(s) Oral once PRN Blood Glucose LESS THAN 70 milliGRAM(s)/deciliter  melatonin 3 milliGRAM(s) Oral at bedtime PRN Insomnia  ondansetron Injectable 4 milliGRAM(s) IV Push every 8 hours PRN Nausea and/or Vomiting      Care Discussed with Consultants/Other Providers [ ] YES  [ ] NO

## 2024-03-16 LAB
ANION GAP SERPL CALC-SCNC: 9 MMOL/L — SIGNIFICANT CHANGE UP (ref 7–14)
BASOPHILS # BLD AUTO: 0.02 K/UL — SIGNIFICANT CHANGE UP (ref 0–0.2)
BASOPHILS NFR BLD AUTO: 0.4 % — SIGNIFICANT CHANGE UP (ref 0–2)
BUN SERPL-MCNC: 52 MG/DL — HIGH (ref 7–23)
CALCIUM SERPL-MCNC: 8.6 MG/DL — SIGNIFICANT CHANGE UP (ref 8.4–10.5)
CHLORIDE SERPL-SCNC: 89 MMOL/L — LOW (ref 98–107)
CO2 SERPL-SCNC: 40 MMOL/L — HIGH (ref 22–31)
CREAT SERPL-MCNC: 1.42 MG/DL — HIGH (ref 0.5–1.3)
EGFR: 55 ML/MIN/1.73M2 — LOW
EOSINOPHIL # BLD AUTO: 0.12 K/UL — SIGNIFICANT CHANGE UP (ref 0–0.5)
EOSINOPHIL NFR BLD AUTO: 2.6 % — SIGNIFICANT CHANGE UP (ref 0–6)
GLUCOSE BLDC GLUCOMTR-MCNC: 100 MG/DL — HIGH (ref 70–99)
GLUCOSE BLDC GLUCOMTR-MCNC: 126 MG/DL — HIGH (ref 70–99)
GLUCOSE BLDC GLUCOMTR-MCNC: 129 MG/DL — HIGH (ref 70–99)
GLUCOSE BLDC GLUCOMTR-MCNC: 142 MG/DL — HIGH (ref 70–99)
GLUCOSE BLDC GLUCOMTR-MCNC: 160 MG/DL — HIGH (ref 70–99)
GLUCOSE SERPL-MCNC: 100 MG/DL — HIGH (ref 70–99)
HCT VFR BLD CALC: 35.5 % — LOW (ref 39–50)
HGB BLD-MCNC: 11.4 G/DL — LOW (ref 13–17)
IANC: 1.86 K/UL — SIGNIFICANT CHANGE UP (ref 1.8–7.4)
IMM GRANULOCYTES NFR BLD AUTO: 0.2 % — SIGNIFICANT CHANGE UP (ref 0–0.9)
LYMPHOCYTES # BLD AUTO: 1.94 K/UL — SIGNIFICANT CHANGE UP (ref 1–3.3)
LYMPHOCYTES # BLD AUTO: 42.1 % — SIGNIFICANT CHANGE UP (ref 13–44)
MAGNESIUM SERPL-MCNC: 2.1 MG/DL — SIGNIFICANT CHANGE UP (ref 1.6–2.6)
MCHC RBC-ENTMCNC: 28.4 PG — SIGNIFICANT CHANGE UP (ref 27–34)
MCHC RBC-ENTMCNC: 32.1 GM/DL — SIGNIFICANT CHANGE UP (ref 32–36)
MCV RBC AUTO: 88.3 FL — SIGNIFICANT CHANGE UP (ref 80–100)
MONOCYTES # BLD AUTO: 0.66 K/UL — SIGNIFICANT CHANGE UP (ref 0–0.9)
MONOCYTES NFR BLD AUTO: 14.3 % — HIGH (ref 2–14)
NEUTROPHILS # BLD AUTO: 1.86 K/UL — SIGNIFICANT CHANGE UP (ref 1.8–7.4)
NEUTROPHILS NFR BLD AUTO: 40.4 % — LOW (ref 43–77)
NRBC # BLD: 0 /100 WBCS — SIGNIFICANT CHANGE UP (ref 0–0)
NRBC # FLD: 0 K/UL — SIGNIFICANT CHANGE UP (ref 0–0)
PHOSPHATE SERPL-MCNC: 3.4 MG/DL — SIGNIFICANT CHANGE UP (ref 2.5–4.5)
PLATELET # BLD AUTO: 227 K/UL — SIGNIFICANT CHANGE UP (ref 150–400)
POTASSIUM SERPL-MCNC: 3.2 MMOL/L — LOW (ref 3.5–5.3)
POTASSIUM SERPL-SCNC: 3.2 MMOL/L — LOW (ref 3.5–5.3)
RBC # BLD: 4.02 M/UL — LOW (ref 4.2–5.8)
RBC # FLD: 14.5 % — SIGNIFICANT CHANGE UP (ref 10.3–14.5)
SODIUM SERPL-SCNC: 138 MMOL/L — SIGNIFICANT CHANGE UP (ref 135–145)
WBC # BLD: 4.61 K/UL — SIGNIFICANT CHANGE UP (ref 3.8–10.5)
WBC # FLD AUTO: 4.61 K/UL — SIGNIFICANT CHANGE UP (ref 3.8–10.5)

## 2024-03-16 RX ORDER — SPIRONOLACTONE 25 MG/1
50 TABLET, FILM COATED ORAL DAILY
Refills: 0 | Status: DISCONTINUED | OUTPATIENT
Start: 2024-03-16 | End: 2024-03-28

## 2024-03-16 RX ORDER — POTASSIUM CHLORIDE 20 MEQ
40 PACKET (EA) ORAL EVERY 4 HOURS
Refills: 0 | Status: COMPLETED | OUTPATIENT
Start: 2024-03-16 | End: 2024-03-16

## 2024-03-16 RX ADMIN — BUMETANIDE 2 MILLIGRAM(S): 0.25 INJECTION INTRAMUSCULAR; INTRAVENOUS at 15:22

## 2024-03-16 RX ADMIN — Medication 40 MILLIEQUIVALENT(S): at 11:51

## 2024-03-16 RX ADMIN — Medication 81 MILLIGRAM(S): at 11:52

## 2024-03-16 RX ADMIN — Medication 1: at 13:06

## 2024-03-16 RX ADMIN — Medication 25 MILLIGRAM(S): at 06:49

## 2024-03-16 RX ADMIN — SPIRONOLACTONE 25 MILLIGRAM(S): 25 TABLET, FILM COATED ORAL at 06:49

## 2024-03-16 RX ADMIN — LOSARTAN POTASSIUM 25 MILLIGRAM(S): 100 TABLET, FILM COATED ORAL at 06:49

## 2024-03-16 RX ADMIN — BUMETANIDE 2 MILLIGRAM(S): 0.25 INJECTION INTRAMUSCULAR; INTRAVENOUS at 06:49

## 2024-03-16 RX ADMIN — Medication 40 MILLIEQUIVALENT(S): at 15:24

## 2024-03-16 RX ADMIN — Medication 300 MILLIGRAM(S): at 06:49

## 2024-03-16 NOTE — CONSULT NOTE ADULT - SUBJECTIVE AND OBJECTIVE BOX
CARDIOLOGY CONSULT NOTE - DR. REYES        Date of Service: 24 @ 13:51      HPI:  63 yr old male with a pmh of HTN, HF rEF ( EF33%), T2DM on metformin who presents with ROSS and bilateral lower extremity edema with his lefgs feeling heavy. This has been progressing since just after 2023. Pt endorses 2 falls (one in the beginning of December and the other toward the end) where he fell due to weakness and had LOC for a few seconds. He presented to and [facility] where he was discharged home (states they did not do any tests and when we tried to log in to his portal he did not know the username/password).   Pt states he is not always compliant with his medications.   Denies  headache, dizziness, chest pain, palpitations, abdominal pain, joint pain, diarrhea/constipation, urinary symptoms.   Vitals: T 97.7, , /130-> 148/106, RR 16 satting 99% RA (02 Mar 2024 20:59)    feels better  less dyspnea  no cp  NSVT on tele     PAST MEDICAL & SURGICAL HISTORY:  HTN (hypertension)      HLD (hyperlipidemia)      DM (diabetes mellitus)      HFrEF (heart failure with reduced ejection fraction)      No significant past surgical history            PREVIOUS DIAGNOSTIC TESTING:    [ ] Echocardiogram:  [ ]  Catheterization:  [ ] Stress Test:  	    MEDICATIONS:    Home Medications:  METFORMIN HCL 1,000 MG TABLET: TAKE 1 TABLET BY MOUTH TWICE A DAY WITH MEALS (02 Mar 2024 20:59)  METOPROLOL SUCC ER 50 MG TAB: TAKE 1 TABLET BY MOUTH EVERY DAY (02 Mar 2024 20:59)      MEDICATIONS  (STANDING):  dextrose 5%. 1000 milliLiter(s) (100 mL/Hr) IV Continuous <Continuous>  dextrose 5%. 1000 milliLiter(s) (50 mL/Hr) IV Continuous <Continuous>  dextrose 50% Injectable 25 Gram(s) IV Push once  dextrose 50% Injectable 25 Gram(s) IV Push once  dextrose 50% Injectable 12.5 Gram(s) IV Push once  furosemide   Injectable 40 milliGRAM(s) IV Push daily  glucagon  Injectable 1 milliGRAM(s) IntraMuscular once  hydrALAZINE 25 milliGRAM(s) Oral three times a day  influenza   Vaccine 0.5 milliLiter(s) IntraMuscular once  insulin lispro (ADMELOG) corrective regimen sliding scale   SubCutaneous at bedtime  insulin lispro (ADMELOG) corrective regimen sliding scale   SubCutaneous three times a day before meals      FAMILY HISTORY:  No pertinent family history in first degree relatives        SOCIAL HISTORY:    [ x] Non-smoker  [ ] Smoker  [ ] Alcohol    Allergies    No Known Allergies    Intolerances    	    REVIEW OF SYSTEMS:  CONSTITUTIONAL: No fever, weight loss, or fatigue  EYES: No eye pain, visual disturbances, or discharge  ENMT:  No difficulty hearing, tinnitus, vertigo; No sinus or throat pain  NECK: No pain or stiffness  RESPIRATORY: No cough, wheezing, chills or hemoptysis; + Shortness of Breath  CARDIOVASCULAR: as HPI  GASTROINTESTINAL: No abdominal or epigastric pain. No nausea, vomiting, or hematemesis; No diarrhea or constipation. No melena or hematochezia.  GENITOURINARY: No dysuria, frequency, hematuria, or incontinence  NEUROLOGICAL: No headaches, memory loss, loss of strength, numbness, or tremors  SKIN: No itching, burning, rashes, or lesions   	  [ ] All others negative	  [ ] Unable to obtain    PHYSICAL EXAM:    T(C): 36.4 (24 @ 09:18), Max: 36.5 (24 @ 21:49)  HR: 92 (24 @ 11:48) (92 - 113)  BP: 142/77 (24 @ 11:48) (135/105 - 160/127)  RR: 18 (24 @ 09:18) (16 - 20)  SpO2: 98% (24 @ 09:18) (98% - 99%)  Wt(kg): --  I&O's Summary    02 Mar 2024 07:01  -  03 Mar 2024 07:00  --------------------------------------------------------  IN: 250 mL / OUT: 1200 mL / NET: -950 mL      Daily Height in cm: 175.26 (02 Mar 2024 22:40)    Daily Weight in k.8 (03 Mar 2024 05:10)    Appearance: Normal	  Psychiatry: A & O x 3, Mood & affect appropriate  HEENT:   Normal oral mucosa, PERRL, EOMI	  Lymphatic: No lymphadenopathy  Cardiovascular: Normal S1 S2,RRR, No JVD, No murmurs  Respiratory: Lungs clear to auscultation	  Gastrointestinal:  Soft, Non-tender, + BS	  Skin: No rashes, No ecchymoses, No cyanosis	  Neurologic: Non-focal  Extremities: Normal range of motion, edema b/l  Vascular: Peripheral pulses palpable 2+ bilaterally    TELEMETRY: 	    ECG:  	sr  ivcd   RADIOLOGY:  OTHER: 	  	  LABS:	 	    CARDIAC MARKERS:        proBNP:     Lipid Profile:   HgA1c:   TSH:                           12.1   4.56  )-----------( 247      ( 03 Mar 2024 06:47 )             37.0         143  |  103  |  17  ----------------------------<  106<H>  3.4<L>   |  26  |  0.98    Ca    8.8      03 Mar 2024 06:47  Mg     1.80     -    TPro  6.7  /  Alb  3.0<L>  /  TBili  1.2  /  DBili  x   /  AST  24  /  ALT  13  /  AlkPhos  260<H>          Creatinine: 0.98 mg/dL (24 @ 06:47)  Creatinine: 1.07 mg/dL (24 @ 12:21)        ASSESSMENT/PLAN: 	              
[facility] NEPHROLOGY- CONSULTATION NOTE    Patient is a 63y Male with baseline creatinine of ~1 has been hospitalized for ~2 weeks due to decompensated HF.  Pt has been on loop diuretics the entire admission including a bumetanide gtt from 3/8-3/12.  Creatinine had been at baseline until gtt started on 3/8 and then leveled off at 1.4-1.5 when the gtt was stopped on 3/12.  Renal fxn has been stable since then.  K+ has been intermittently low.      Pt feels well (and much better than on admission) and has no complaints.    PAST MEDICAL & SURGICAL HISTORY:  HTN (hypertension)      HLD (hyperlipidemia)      DM (diabetes mellitus)      HFrEF (heart failure with reduced ejection fraction)      No significant past surgical history        No Known Allergies    Home Medications Reviewed  Hospital Medications:   MEDICATIONS  (STANDING):  aspirin enteric coated 81 milliGRAM(s) Oral daily  buMETAnide 2 milliGRAM(s) Oral two times a day  dextrose 5%. 1000 milliLiter(s) (100 mL/Hr) IV Continuous <Continuous>  dextrose 5%. 1000 milliLiter(s) (50 mL/Hr) IV Continuous <Continuous>  dextrose 50% Injectable 25 Gram(s) IV Push once  dextrose 50% Injectable 25 Gram(s) IV Push once  dextrose 50% Injectable 12.5 Gram(s) IV Push once  glucagon  Injectable 1 milliGRAM(s) IntraMuscular once  influenza   Vaccine 0.5 milliLiter(s) IntraMuscular once  insulin lispro (ADMELOG) corrective regimen sliding scale   SubCutaneous three times a day before meals  insulin lispro (ADMELOG) corrective regimen sliding scale   SubCutaneous at bedtime  losartan 25 milliGRAM(s) Oral daily  metolazone 2.5 milliGRAM(s) Oral <User Schedule>  metoprolol succinate  milliGRAM(s) Oral daily  spironolactone 50 milliGRAM(s) Oral daily    SOCIAL HISTORY:  Denies ETOh,Smoking,   FAMILY HISTORY:  No pertinent family history in first degree relatives      REVIEW OF SYSTEMS:  CONSTITUTIONAL: No weakness, fevers or chills  EYES/ENT: No visual changes;  No vertigo or throat pain   NECK: No pain or stiffness  RESPIRATORY: No cough, wheezing, hemoptysis; No shortness of breath  CARDIOVASCULAR: No chest pain or palpitations.  GASTROINTESTINAL: No abdominal or epigastric pain. No nausea, vomiting, or hematemesis; No diarrhea or constipation. No melena or hematochezia.  GENITOURINARY: No dysuria, frequency, foamy urine, urinary urgency, incontinence or hematuria  NEUROLOGICAL: No numbness or weakness  SKIN: No itching, burning, rashes, or lesions   VASCULAR: No bilateral lower extremity edema.   All other review of systems is negative unless indicated above.    VITALS:  T(F): 97.2 (03-16-24 @ 19:03), Max: 97.7 (03-16-24 @ 06:26)  HR: 77 (03-16-24 @ 19:03)  BP: 100/65 (03-16-24 @ 19:03)  RR: 18 (03-16-24 @ 19:03)  SpO2: 99% (03-16-24 @ 19:03)  Wt(kg): --    03-15 @ 07:01  -  03-16 @ 07:00  --------------------------------------------------------  IN: 820 mL / OUT: 4150 mL / NET: -3330 mL          PHYSICAL EXAM:  Constitutional: NAD  HEENT: anicteric sclera, oropharynx clear, MMM  Neck: No JVD  Respiratory: CTAB, no wheezes, rales or rhonchi  Cardiovascular: S1, S2, RRR  Gastrointestinal: BS+, soft, NT/ND  Extremities: No cyanosis or clubbing. No peripheral edema  Neurological: A/O x 3, no focal deficits  Psychiatric: Normal mood, normal affect  : No CVA tenderness. No thompson.   Skin: No rashes    LABS:  03-16    138  |  89<L>  |  52<H>  ----------------------------<  100<H>  3.2<L>   |  40<H>  |  1.42<H>    Ca    8.6      16 Mar 2024 07:30  Phos  3.4     03-16  Mg     2.10     03-16    TPro  6.0  /  Alb  2.9<L>  /  TBili  0.8  /  DBili  0.3  /  AST  28  /  ALT  14  /  AlkPhos  267<H>  03-15    Creatinine Trend: 1.42 <--, 1.41 <--, 1.40 <--, 1.46 <--, 1.26 <--, 1.17 <--, 1.23 <--                        11.4   4.61  )-----------( 227      ( 16 Mar 2024 07:30 )             35.5     Urine Studies:  Urinalysis Basic - ( 16 Mar 2024 07:30 )    Color:  / Appearance:  / SG:  / pH:   Gluc: 100 mg/dL / Ketone:   / Bili:  / Urobili:    Blood:  / Protein:  / Nitrite:    Leuk Esterase:  / RBC:  / WBC    Sq Epi:  / Non Sq Epi:  / Bacteria:         RADIOLOGY & ADDITIONAL STUDIES:  < from: Transthoracic Echocardiogram (03.04.23 @ 07:44) >    Patient name: JOHN PAUL ARREAGA  YOB: 1960   Age: 62 (M)   MR#: [id number]  Study Date: 3/4/2023  [facility]  Study quality: Technically good  Referring Physician: Juanjose Zavala MD  Blood Pressure: 167/107 mmHg  Height: 175 cm  Weight: 100 kg  BSA: 2.2 m2  ------------------------------------------------------------------------  PROCEDURE: Transthoracic echocardiogram with 2-D, M-Mode  and complete spectral and color flow Doppler.  INDICATION: Heart failure, unspecified (I50.9)  ------------------------------------------------------------------------  DIMENSIONS:  Dimensions:     Normal Values:  LA:     4.9 cm    2.0 - 4.0 cm  Ao:     3.2 cm    2.0 - 3.8 cm  SEPTUM: 1.1 cm    0.6 - 1.2 cm  PWT:    1.1 cm    0.6 - 1.1 cm  LVIDd:  6.6 cm    3.0 - 5.6 cm  LVIDs:  5.2 cm    1.8 - 4.0 cm  Derived Variables:  LVMI: 153 g/m2  RWT: 0.33  Fractional short: 21 %  Ejection Fraction (Modified Suero Rule): 33 %  ------------------------------------------------------------------------  OBSERVATIONS:  Mitral Valve: Tethered mitral valve leaflets with normal  opening. Mild mitral regurgitation.  Aortic Root: Normal aortic root.  Aortic Valve: Normal trileaflet aortic valve. Minimal  aortic regurgitation.  Left Atrium: Moderately dilated left atrium.  LA volume  index = 44 cc/m2.  Left Ventricle: Severe global left ventricular systolic  dysfunction. Normal left ventricular internal dimensions  and wall thicknesses.  Right Heart: Normal right atrium. Normal right ventricular  size and function. Normal tricuspid valve. Minimal  tricuspid regurgitation. Normal pulmonic valve.  Pericardium/PleuraNormal pericardium with no pericardial  effusion.  Hemodynamic: Estimated right ventricular systolic pressure  equals 27 mm Hg, assuming right atrial pressure equals 10  mm Hg, consistent with normal pulmonary pressures.  ------------------------------------------------------------------------  CONCLUSIONS:  1. Tethered mitral valve leaflets with normal opening.  2. Moderately dilated left atrium.  LA volume index = 44  cc/m2.  3. Severe global left ventricular systolic dysfunction.  4. Normal right ventricular size and function.  5. Normal tricuspid valve. Minimal tricuspid regurgitation.  6. Estimated pulmonary artery systolic pressure equals 27  mm Hg, assuming right atrial pressure equals 10  mm Hg,  consistent with normal pulmonary pressures.  *** No previous Echo exam.  ------------------------------------------------------------------------  Confirmed on  3/4/2023 - 12:09:10 by Shyann Gutierrez MD  ------------------------------------------------------------------------    < end of copied text >      < from: Xray Chest 2 Views PA/Lat (03.02.24 @ 12:19) >    ACC: [id number] EXAM:  XR CHEST PA LAT 2V   ORDERED BY: LUIS COCHRAN     PROCEDURE DATE:  03/02/2024          INTERPRETATION:  CLINICAL HISTORY: Shortness of Breath.    COMPARISON: Chest radiograph dated 3/3/2023.    TECHNIQUE: Frontal and lateral chest radiographs. Adequate positioning.    FINDINGS:    Support devices: None.    Cardiac/mediastinum/hilum: The cardiomediastinal silhouette is enlarged.    Lung parenchyma/Pleura: No focal consolidation, pleural effusion, or   pneumothorax. Nopulmonary vascular congestion.    Skeleton/soft tissues: No acute osseous deformity.      IMPRESSION:    Cardiomegaly.    --- End of Report ---          ARIANNA RIVERA DO; Resident Radiologist  This document has been electronically signed.  ETTA YU MD; Attending Radiologist  This document has been electronically signed. Mar  2 2024 12:33PM    < end of copied text >            
Patient is a 63y old  Male who presents with a chief complaint of Acute decompensated heart failure (04 Mar 2024 09:51)  63 yr old male with a pmh of HTN, HF rEF ( EF33%), T2DM on metformin who presented with acute on chronic ADHF with 30lb weight gain in last month.   This has been progressing since 2023. Pt endorses 2 falls due to lower extremity weakness but denies syncope.  Patient states he was admitted to [facility] year for similar symptoms. He has been noncompliant with medications for unclear reasons. ICD was never discussed. During this admission he has been in sinus rhythm with frequent episodes of NSVT @150 bpm, longest run 31 beats.  Echo LVEF 33% with global LV dysfunction. Mild MR. Patient being aggressively diuresed. Beta blockers increased for ectopy suppression.   EP consulted for possible ICD.        PAST MEDICAL & SURGICAL HISTORY:  HTN (hypertension)  HLD (hyperlipidemia  DM (diabetes mellitus)  HFrEF (heart failure with reduced ejection fraction)      No significant past surgical history      MEDICATIONS  (STANDING):  dextrose 5%. 1000 milliLiter(s) (100 mL/Hr) IV Continuous <Continuous>  dextrose 5%. 1000 milliLiter(s) (50 mL/Hr) IV Continuous <Continuous>  dextrose 50% Injectable 25 Gram(s) IV Push once  dextrose 50% Injectable 25 Gram(s) IV Push once  dextrose 50% Injectable 12.5 Gram(s) IV Push once  furosemide   Injectable 40 milliGRAM(s) IV Push two times a day  glucagon  Injectable 1 milliGRAM(s) IntraMuscular once  hydrALAZINE 25 milliGRAM(s) Oral three times a day  influenza   Vaccine 0.5 milliLiter(s) IntraMuscular once  insulin lispro (ADMELOG) corrective regimen sliding scale   SubCutaneous at bedtime  insulin lispro (ADMELOG) corrective regimen sliding scale   SubCutaneous three times a day before meals  sacubitril 24 mG/valsartan 26 mG 1 Tablet(s) Oral two times a day    MEDICATIONS  (PRN):  acetaminophen     Tablet .. 650 milliGRAM(s) Oral every 6 hours PRN Temp greater or equal to 38C (100.4F), Mild Pain (1 - 3)  aluminum hydroxide/magnesium hydroxide/simethicone Suspension 30 milliLiter(s) Oral every 4 hours PRN Dyspepsia  dextrose Oral Gel 15 Gram(s) Oral once PRN Blood Glucose LESS THAN 70 milliGRAM(s)/deciliter  melatonin 3 milliGRAM(s) Oral at bedtime PRN Insomnia  ondansetron Injectable 4 milliGRAM(s) IV Push every 8 hours PRN Nausea and/or Vomiting      FAMILY HISTORY:  No pertinent family history in first degree relatives        SOCIAL HISTORY:    CIGARETTES: no  DRUGS: No  ALCOHOL: no    REVIEW OF SYSTEMS:    CONSTITUTIONAL: No fever,  chills, shakes, or fatigue  +30lb weight gain in one month  EYES: No eye pain, visual disturbances, or discharge  ENMT:  No difficulty hearing, tinnitus, vertigo; No sinus or throat pain  NECK: No pain or stiffness  BREASTS: No pain, masses, or nipple discharge  RESPIRATORY: No wheezing, hemoptysis, + shortness of breath at rest. Unable to lay flat  CARDIOVASCULAR: No chest pain, palpitations, dizziness, syncope, paroxysmal nocturnal dyspnea,  +leg swelling  GASTROINTESTINAL: No abdominal  or epigastric pain, nausea, vomiting, hematemesis, diarrhea, constipation, melena or bright red blood.  GENITOURINARY: No dysuria, nocturia, hematuria, or urinary incontinence  NEUROLOGICAL: No headaches, memory loss, slurred speech, limb weakness, loss of strength, numbness, or tremors  SKIN: No itching, burning, rashes, or lesions   LYMPH NODES: No enlarged glands  ENDOCRINE: No heat or cold intolerance, or hair loss  MUSCULOSKELETAL: No joint pain or swelling, muscle, back, or extremity pain  PSYCHIATRIC: No depression, anxiety, or difficulty sleeping  HEME/LYMPH: No easy bruising or bleeding gums  ALLERGY AND IMMUNOLOGIC: No hives or rash.      Vital Signs Last 24 Hrs  T(C): 36.8 (04 Mar 2024 17:05), Max: 36.8 (04 Mar 2024 17:05)  T(F): 98.2 (04 Mar 2024 17:05), Max: 98.2 (04 Mar 2024 17:05)  HR: 82 (04 Mar 2024 17:05) (67 - 97)  BP: 128/85 (04 Mar 2024 17:05) (128/85 - 151/97)  BP(mean): --  RR: 18 (04 Mar 2024 17:05) (16 - 18)  SpO2: 99% (04 Mar 2024 17:05) (96% - 99%)    Parameters below as of 04 Mar 2024 17:05  Patient On (Oxygen Delivery Method): room air        PHYSICAL EXAM:    GENERAL: Patient sitting upright in bed. Dyspneic when talking  HEAD:  Atraumatic, Normocephalic  EYES: EOMI, PERRLA, conjunctiva and sclera clear  ENMT: No tonsillar erythema, exudates, or enlargement; Moist mucous membranes,   NECK: Supple and normal thyroid.  + JVD   HEART: Regular rate and rhythm; No murmurs, rubs, or gallops.  PULMONARY: decreased breath sounds throughout. + bibasilar rales  ABDOMEN: Obese Nontendr Bowel sounds present  EXTREMITIES: + lower extremity pitting edema  LYMPH: No lymphadenopathy noted  NEUROLOGICAL: Grossly nonfocal          INTERPRETATION OF TELEMETRY: NSR 90 with NSVT @150 bpm. Longest marilu 31 beats.     ECG:  Sinus rhythm 91 bpm. 1st degree AVB (ME 208ms)  QRSd 94ms. Anteroseptal infarct, ageindeterminant    LABS:                        12.2   5.19  )-----------( 236      ( 04 Mar 2024 06:12 )             38.2     03-04    141  |  103  |  22  ----------------------------<  178<H>  3.5   |  27  |  1.07    Ca    8.5      04 Mar 2024 06:12  Phos  3.0     03-04  Mg     1.90     03-04    TPro  6.3  /  Alb  2.8<L>  /  TBili  0.8  /  DBili  x   /  AST  21  /  ALT  12  /  AlkPhos  282<H>  03-04          Urinalysis Basic - ( 04 Mar 2024 06:12 )    Color: x / Appearance: x / SG: x / pH: x  Gluc: 178 mg/dL / Ketone: x  / Bili: x / Urobili: x   Blood: x / Protein: x / Nitrite: x   Leuk Esterase: x / RBC: x / WBC x   Sq Epi: x / Non Sq Epi: x / Bacteria: x      BNP: 05276    RADIOLOGY & ADDITIONAL STUDIES:  PREVIOUS DIAGNOSTIC TESTING:    < from: Xray Chest 2 Views PA/Lat (24 @ 12:19) >  ACC: [id number] EXAM:  XR CHEST PA LAT 2V   ORDERED BY: LUIS COCHRAN     PROCEDURE DATE:  2024          INTERPRETATION:  CLINICAL HISTORY: Shortness of Breath.    COMPARISON: Chest radiograph dated 3/3/2023.    TECHNIQUE: Frontal and lateral chest radiographs. Adequate positioning.    FINDINGS:    Support devices: None.    Cardiac/mediastinum/hilum: The cardiomediastinal silhouette is enlarged.    Lung parenchyma/Pleura: No focal consolidation, pleural effusion, or   pneumothorax. No pulmonary vascular congestion.    Skeleton/soft tissues: No acute osseous deformity.      IMPRESSION:    Cardiomegaly.    --- End of Report ---          ARIANNA RIVERA DO; Resident Radiologist  This document has been electronically signed.  ETTA UY MD; Attending Radiologist  This document has been electronically signed. Mar  2 2024 12:33PM    < end of copied text >    ECHO  FINDINGS:  < from: Transthoracic Echocardiogram (23 @ 07:44) >  Patient name: JOHN PAUL ARREAGA  YOB: 1960   Age: 62 (M)   MR#: [id number]  Study Date: 3/4/2023  [facility]  Study quality: Technically good  Referring Physician: Juanjose Zavala MD  Blood Pressure: 167/107 mmHg  Height: 175 cm  Weight: 100 kg  BSA: 2.2 m2  ------------------------------------------------------------------------  PROCEDURE: Transthoracic echocardiogram with 2-D, M-Mode  and complete spectral and color flow Doppler.  INDICATION: Heart failure, unspecified (I50.9)  ------------------------------------------------------------------------  DIMENSIONS:  Dimensions:     Normal Values:  LA:     4.9 cm    2.0 - 4.0 cm  Ao:     3.2 cm    2.0 - 3.8 cm  SEPTUM: 1.1 cm    0.6 - 1.2 cm  PWT:    1.1 cm    0.6 - 1.1 cm  LVIDd:  6.6 cm    3.0 - 5.6 cm  LVIDs:  5.2 cm    1.8 - 4.0 cm  Derived Variables:  LVMI: 153 g/m2  RWT: 0.33  Fractional short: 21 %  Ejection Fraction (Modified Suero Rule): 33 %  ------------------------------------------------------------------------  OBSERVATIONS:  Mitral Valve: Tethered mitral valve leaflets with normal  opening. Mild mitral regurgitation.  Aortic Root: Normal aortic root.  Aortic Valve: Normal trileaflet aortic valve. Minimal  aortic regurgitation.  Left Atrium: Moderately dilated left atrium.  LA volume  index = 44 cc/m2.  Left Ventricle: Severe global left ventricular systolic  dysfunction. Normal left ventricular internal dimensions  and wall thicknesses.  Right Heart: Normal right atrium. Normal right ventricular  size and function. Normal tricuspid valve. Minimal  tricuspid regurgitation. Normal pulmonic valve.  Pericardium/PleuraNormal pericardium with no pericardial  < from: Transthoracic Echocardiogram (23 @ 07:44) >  effusion.  Hemodynamic: Estimated right ventricular systolic pressure  equals 27 mm Hg, assuming right atrial pressure equals 10  mm Hg, consistent with normal pulmonary pressures.  ------------------------------------------------------------------------  CONCLUSIONS:  1. Tethered mitral valve leaflets with normal opening.  2. Moderately dilated left atrium.  LA volume index = 44  cc/m2.  3. Severe global left ventricular systolic dysfunction.  4. Normal right ventricular size and function.  5. Normal tricuspid valve. Minimal tricuspid regurgitation.  6. Estimated pulmonary artery systolic pressure equals 27  mm Hg, assuming right atrial pressure equals 10  mm Hg,  consistent with normal pulmonary pressures.  *** No previous Echo exam.  ------------------------------------------------------------------------  Confirmed on  3/4/2023 - 12:09:10 by Shyann Gutierrez MD  ------------------------------------------------------------------------    CATHETERIZATION  FINDINGS:  < from: Cardiac Catheterization (23 @ 13:38) >  Study Date:     2023   Name:           JOHN PAUL ARREAGA   :            1960   (62 years)   Gender:         male   MR#:            [id number]   MPI#:           [id number]   Patient Class:  Inpatient     Cath Lab Report    Diagnostic Cardiologist:       Oliver Willis MD   Fellow:                        Marizol Barroso MD   Fellow:                        Robbie Briceno Fellow   Referring Physician:           Kevin Zavala MD     Procedures Performed   Procedures:        1.    Arterial Access - Right Radial     2.    Diagnostic Coronary Angiography   3.    Left Heart Cath     Indications:                Cardiomyopathy     Diagnostic Conclusions:   Coronaries with minor luminal irregularities. Elevated LVEDP.     Recommendations:   Optimize medical therapy for nonischemic cardiomyopathy.       < end of copied text >

## 2024-03-16 NOTE — CONSULT NOTE ADULT - ASSESSMENT
A/P  63 yr old male with a pmh of HTN, HF rEF ( EF33%), T2DM on metformin who presents with ROSS and bilateral lower extremity edema with his legs feeling heavy.     #Acute on chronic HFrEF  -volume overloaded  -continue iv lasix   -Not compliant with home meds (lasix 40mg daily, metoprolol XL 50mg daily, hydralazine 25mg TID)  -f/u echo   -strict I/O, monitor daily weights, fluid restriction   -eps eval for NSVT, if EF < 35%, ICD/CRTD for cmp, HF  -cont bb, add entresto     #syncope, loc  -depressed ed, ivcd, nsvt on tele, up to 31 beats  -eps eval   -f/u ct head    #Hypertensive urgency.   -bp improved   -continue hydralazine 25mg TID, metoprolol XL 50mg daily and lasix   -add entresto     #T2DM (type 2 diabetes mellitus).   -med f/u     #HLD  -cont statin        dvt ppx    80 minutes spent on total encounter; more than 50% of the visit was spent counseling and/or coordinating care by the attending physician.    
63 year-old man likely with CKD 3a in the setting of CHF.    1. CKD 3a- it is likely that pt has CKD 3a in the setting of CHF.  Lower creatinines probably represented a state of volume-overload and pt now seems euvolemic following the bumetanide gtt and has stable renal fxn.  Would not consider this DONNA and would consider baseline creatinine to be 1.3-1.4.  Okay to continue ARB and spironolactone as well as bumetanide.    2. Hypokalemia- extended use of high dose loop diuretics likely have depleted intracellular potassium stores.  Will likely need a fairly high dose of K-supplementation to replete, but would be careful that pt does not develop hyperkalemia once replete.  Agree with increasing spironolactone.  Would hold metolazone for now though okay to give as outpatient once K+ is replete.  Continue to replete K+ on a prn basis.  Monitor K+.    3. Metabolic alkalosis in the setting of diuresis-  will give acetazolamide tomorrow if K+ has improved.    4. Acute on chronic systolic HF-  diuretics as above.  Other cardiac medications per cardiology.    [facility]  Adi Diaz M.D.  Nj Abarca D.O.  Kezia Dunn M.D.  MD Ute Humphrey, MSN, ANP-C    Telephone: [phone]  Facsimile: [phone]    [address] [place] [address], [address]  [place] [address]  
63 yr old male with a pmh of HTN, nonischemic cardiomyopathy ( EF33% this admission echo), HFrEF, T2DM on metformin who presented with acute on chronic ADHF.    This has been progressing since Thanksgiving 2023. Pt endorses 2 falls due to lower extremity weakness but denies syncope.  Patient states he was admitted to [facility] last year for similar symptoms. He has been noncompliant with medications for unclear reasons. ICD was never discussed. During this admission he has been in sinus rhythm with frequent episodes of NSVT @150 bpm, longest run 31 beats. Asymptomatic.    Echo LVEF 33% with global LV dysfunction. Mild MR. Patient being aggressively diuresed. Beta blockers increased for ectopy suppression.    Cardiac cath 3/2023: coronaries with minor luminal irregularities   EP consulted for possible ICD.   In light of his noncompliance with optimal medical therapy, he is not a candidate for ICD for primary prevention at this time despite telemetry evidence of NSVT and LVEF 33%. . Our recommendation would be to continue aggressive diuresis while continuing GDMT for a duration of 3 months then reevaluate his LVEF. His NSVT episodes will likely become less frequent with improvement of his heart failure and with increased beta blockers.    Keep K+ > 4.0 and Mg > 2.0.

## 2024-03-16 NOTE — PROGRESS NOTE ADULT - ASSESSMENT
63 yr old male with a pmh of HTN, HF rEF ( EF33%), T2DM on metformin who presents with ROSS and bilateral lower extremity edema with his legs feeling heavy.     #Acute on chronic systolic heart Failure : due to non complaince with meds : d/w pt and niece need to adhere with meds and need for AICD after re-evaluation of compliance   cont diuresis   on bumex 2 mg now PO bid   metolazone 2.5 mg x 3 times/ week   losartan started   c/w Toprol  mg daily   Cardio following   EF 27%  started on aldactone 50 mg daily         #syncope  #NSVT on tele   on BBlocker   seen by EP   still having frequent ectopy on tele monitor : recommend AICD during this admission : if patient agrees   c/w tele monitor      #Hypertensive   controlled now     #DM-2   a1c 7.1  c/w Insulin / FSBS    #HLD  -cont statin    # non compliance with meds :  counseling done     dispo: c/w tele monitor , EP is considering AICD during this admission

## 2024-03-16 NOTE — PROGRESS NOTE ADULT - ASSESSMENT
ECHO 3/4/23: EF 33% mild MR, Severe global left ventricular systolic dysfunction  cardiac cath from 3/6/23  Coronaries with minor luminal irregularities. Elevated LVEDP.      A/P  63 yr old male with a pmh of HTN, HF rEF ( EF33%), T2DM on metformin who presents with ROSS and bilateral lower extremity edema with his legs feeling heavy.     #Acute on chronic HFrEF  -continue bumex 2 mg PO BID  -volume status improving  -renal fxn stable   -cont metolazone 3x week   -supplement k  -continue aldactone, inc to 50mg qd  -Not compliant with home meds (lasix 40mg daily, metoprolol XL 50mg daily, hydralazine 25mg TID)  -repeat echo this admission with ef 27%   Left ventricular systolic function is severely decreased  -Prior cardiac cath from 3/6/23  Coronaries with minor luminal irregularities. Elevated LVEDP.   -strict I/O, monitor daily weights, fluid restriction   -Continue toprol 300mg PO daily-- events on tele noted   -eps eval noted for NSVT  -await ICD monday, patient agreeable   -per ACP insurance does not cover entresto   -c/w losartan   -dec hydral to allow bp room for aldactone    #syncope, loc  -depressed ed, ivcd, nsvt on tele, up to 31 beats  -eps eval noted   -ct head unremarkable   -await icd     #Hypertensive urgency.   -bp improved   -continue meds     #T2DM (type 2 diabetes mellitus).   -med f/u     #HLD  -cont statin    dvt ppx    45 minutes spent on total encounter; more than 50% of the visit was spent counseling and/or coordinating care by the attending physician.

## 2024-03-16 NOTE — PROGRESS NOTE ADULT - SUBJECTIVE AND OBJECTIVE BOX
CARDIOLOGY FOLLOW UP NOTE - DR. REYES    Patient Name: JOHN PAUL ARREAGA    Date of Service: 03-16-24 @ 10:08    Patient seen and examined    Subjective:    cv: denies chest pain, dyspnea, palpitations, dizziness  pulmonary: denies cough  GI: denies abdominal pain, nausea, vomiting  vascular/legs: no edema   skin: no rash  ROS: otherwise negative   overnight events:      PHYSICAL EXAM:  T(C): 36.5 (03-16-24 @ 06:26), Max: 36.8 (03-15-24 @ 22:23)  HR: 75 (03-16-24 @ 06:26) (74 - 77)  BP: 110/65 (03-16-24 @ 06:26) (96/65 - 110/65)  RR: 18 (03-16-24 @ 06:26) (17 - 18)  SpO2: 95% (03-16-24 @ 06:26) (95% - 99%)  Wt(kg): --  I&O's Summary    15 Mar 2024 07:01  -  16 Mar 2024 07:00  --------------------------------------------------------  IN: 820 mL / OUT: 4150 mL / NET: -3330 mL      Daily     Daily     Appearance: Normal	  Cardiovascular: Normal S1 S2,RRR, No JVD, No murmurs  Respiratory: Lungs clear to auscultation	  Gastrointestinal:  Soft, Non-tender, + BS	  Extremities: Normal range of motion, No clubbing, cyanosis or edema      Home Medications:  METFORMIN HCL 1,000 MG TABLET: TAKE 1 TABLET BY MOUTH TWICE A DAY WITH MEALS (02 Mar 2024 20:59)  METOPROLOL SUCC ER 50 MG TAB: TAKE 1 TABLET BY MOUTH EVERY DAY (02 Mar 2024 20:59)      MEDICATIONS  (STANDING):  aspirin enteric coated 81 milliGRAM(s) Oral daily  buMETAnide 2 milliGRAM(s) Oral two times a day  dextrose 5%. 1000 milliLiter(s) (50 mL/Hr) IV Continuous <Continuous>  dextrose 5%. 1000 milliLiter(s) (100 mL/Hr) IV Continuous <Continuous>  dextrose 50% Injectable 25 Gram(s) IV Push once  dextrose 50% Injectable 25 Gram(s) IV Push once  dextrose 50% Injectable 12.5 Gram(s) IV Push once  glucagon  Injectable 1 milliGRAM(s) IntraMuscular once  hydrALAZINE 25 milliGRAM(s) Oral three times a day  influenza   Vaccine 0.5 milliLiter(s) IntraMuscular once  insulin lispro (ADMELOG) corrective regimen sliding scale   SubCutaneous three times a day before meals  insulin lispro (ADMELOG) corrective regimen sliding scale   SubCutaneous at bedtime  losartan 25 milliGRAM(s) Oral daily  metolazone 2.5 milliGRAM(s) Oral <User Schedule>  metoprolol succinate  milliGRAM(s) Oral daily  potassium chloride    Tablet ER 40 milliEquivalent(s) Oral every 4 hours  spironolactone 25 milliGRAM(s) Oral daily      TELEMETRY: 	    ECG:  	  RADIOLOGY:   DIAGNOSTIC TESTING:  [ ] Echocardiogram:  [ ] Catheterization:  [ ] Stress Test:    OTHER: 	    LABS:	 	    CARDIAC MARKERS:                                      11.4   4.61  )-----------( 227      ( 16 Mar 2024 07:30 )             35.5     03-16    138  |  89<L>  |  52<H>  ----------------------------<  100<H>  3.2<L>   |  40<H>  |  1.42<H>    Ca    8.6      16 Mar 2024 07:30  Phos  3.4     03-16  Mg     2.10     03-16    TPro  6.0  /  Alb  2.9<L>  /  TBili  0.8  /  DBili  0.3  /  AST  28  /  ALT  14  /  AlkPhos  267<H>  03-15    proBNP:     Lipid Profile:   HgA1c:     Creatinine: 1.42 mg/dL (03-16-24 @ 07:30)  Creatinine: 1.41 mg/dL (03-15-24 @ 06:55)  Creatinine: 1.40 mg/dL (03-14-24 @ 06:34)

## 2024-03-16 NOTE — PROGRESS NOTE ADULT - SUBJECTIVE AND OBJECTIVE BOX
Patient is a 63y old  Male who presents with a chief complaint of Acute decompensated heart failure (16 Mar 2024 10:08)      INTERVAL HPI/OVERNIGHT EVENTS: vol status is improving , denies any SOB/ CP  T(C): 36.2 (03-16-24 @ 19:03), Max: 36.5 (03-16-24 @ 06:26)  HR: 77 (03-16-24 @ 19:03) (69 - 77)  BP: 100/65 (03-16-24 @ 19:03) (100/65 - 110/65)  RR: 18 (03-16-24 @ 19:03) (18 - 18)  SpO2: 99% (03-16-24 @ 19:03) (95% - 99%)  Wt(kg): --  I&O's Summary    15 Mar 2024 07:01  -  16 Mar 2024 07:00  --------------------------------------------------------  IN: 820 mL / OUT: 4150 mL / NET: -3330 mL        PAST MEDICAL & SURGICAL HISTORY:  HTN (hypertension)      HLD (hyperlipidemia)      DM (diabetes mellitus)      HFrEF (heart failure with reduced ejection fraction)      No significant past surgical history          SOCIAL HISTORY  Alcohol:  Tobacco:  Illicit substance use:    FAMILY HISTORY:    REVIEW OF SYSTEMS:  CONSTITUTIONAL: No fever, weight loss, or fatigue  EYES: No eye pain, visual disturbances, or discharge  ENMT:  No difficulty hearing, tinnitus, vertigo; No sinus or throat pain  NECK: No pain or stiffness  RESPIRATORY: No cough, wheezing, chills or hemoptysis; No shortness of breath  CARDIOVASCULAR: No chest pain, palpitations, dizziness, or leg swelling  GASTROINTESTINAL: No abdominal or epigastric pain. No nausea, vomiting, or hematemesis; No diarrhea or constipation. No melena or hematochezia.  GENITOURINARY: No dysuria, frequency, hematuria, or incontinence  NEUROLOGICAL: No headaches, memory loss, loss of strength, numbness, or tremors  SKIN: No itching, burning, rashes, or lesions   LYMPH NODES: No enlarged glands  ENDOCRINE: No heat or cold intolerance; No hair loss  MUSCULOSKELETAL: No joint pain or swelling; No muscle, back, or extremity pain  PSYCHIATRIC: No depression, anxiety, mood swings, or difficulty sleeping  HEME/LYMPH: No easy bruising, or bleeding gums  ALLERY AND IMMUNOLOGIC: No hives or eczema    RADIOLOGY & ADDITIONAL TESTS:    Imaging Personally Reviewed:  [ ] YES  [ ] NO    Consultant(s) Notes Reviewed:  [ ] YES  [ ] NO    PHYSICAL EXAM:  GENERAL: NAD, well-groomed, well-developed  HEAD:  Atraumatic, Normocephalic  EYES: EOMI, PERRLA, conjunctiva and sclera clear  ENMT: No tonsillar erythema, exudates, or enlargement; Moist mucous membranes, Good dentition, No lesions  NECK: Supple, No JVD, Normal thyroid  NERVOUS SYSTEM:  Alert & Oriented X3, Good concentration; Motor Strength 5/5 B/L upper and lower extremities; DTRs 2+ intact and symmetric  CHEST/LUNG: Clear to percussion bilaterally; No rales, rhonchi, wheezing, or rubs  HEART: Regular rate and rhythm; No murmurs, rubs, or gallops  ABDOMEN: Soft, Nontender, Nondistended; Bowel sounds present  EXTREMITIES:  2+ Peripheral Pulses, No clubbing, cyanosis, or edema  LYMPH: No lymphadenopathy noted  SKIN: No rashes or lesions    LABS:                        11.4   4.61  )-----------( 227      ( 16 Mar 2024 07:30 )             35.5     03-16    138  |  89<L>  |  52<H>  ----------------------------<  100<H>  3.2<L>   |  40<H>  |  1.42<H>    Ca    8.6      16 Mar 2024 07:30  Phos  3.4     03-16  Mg     2.10     03-16    TPro  6.0  /  Alb  2.9<L>  /  TBili  0.8  /  DBili  0.3  /  AST  28  /  ALT  14  /  AlkPhos  267<H>  03-15      Urinalysis Basic - ( 16 Mar 2024 07:30 )    Color: x / Appearance: x / SG: x / pH: x  Gluc: 100 mg/dL / Ketone: x  / Bili: x / Urobili: x   Blood: x / Protein: x / Nitrite: x   Leuk Esterase: x / RBC: x / WBC x   Sq Epi: x / Non Sq Epi: x / Bacteria: x      CAPILLARY BLOOD GLUCOSE      POCT Blood Glucose.: 129 mg/dL (16 Mar 2024 21:56)  POCT Blood Glucose.: 126 mg/dL (16 Mar 2024 21:39)  POCT Blood Glucose.: 142 mg/dL (16 Mar 2024 17:58)  POCT Blood Glucose.: 160 mg/dL (16 Mar 2024 12:17)  POCT Blood Glucose.: 100 mg/dL (16 Mar 2024 08:57)        Urinalysis Basic - ( 16 Mar 2024 07:30 )    Color: x / Appearance: x / SG: x / pH: x  Gluc: 100 mg/dL / Ketone: x  / Bili: x / Urobili: x   Blood: x / Protein: x / Nitrite: x   Leuk Esterase: x / RBC: x / WBC x   Sq Epi: x / Non Sq Epi: x / Bacteria: x        MEDICATIONS  (STANDING):  aspirin enteric coated 81 milliGRAM(s) Oral daily  buMETAnide 2 milliGRAM(s) Oral two times a day  dextrose 5%. 1000 milliLiter(s) (100 mL/Hr) IV Continuous <Continuous>  dextrose 5%. 1000 milliLiter(s) (50 mL/Hr) IV Continuous <Continuous>  dextrose 50% Injectable 25 Gram(s) IV Push once  dextrose 50% Injectable 12.5 Gram(s) IV Push once  dextrose 50% Injectable 25 Gram(s) IV Push once  glucagon  Injectable 1 milliGRAM(s) IntraMuscular once  influenza   Vaccine 0.5 milliLiter(s) IntraMuscular once  insulin lispro (ADMELOG) corrective regimen sliding scale   SubCutaneous three times a day before meals  insulin lispro (ADMELOG) corrective regimen sliding scale   SubCutaneous at bedtime  losartan 25 milliGRAM(s) Oral daily  metolazone 2.5 milliGRAM(s) Oral <User Schedule>  metoprolol succinate  milliGRAM(s) Oral daily  spironolactone 50 milliGRAM(s) Oral daily    MEDICATIONS  (PRN):  acetaminophen     Tablet .. 650 milliGRAM(s) Oral every 6 hours PRN Temp greater or equal to 38C (100.4F), Mild Pain (1 - 3)  aluminum hydroxide/magnesium hydroxide/simethicone Suspension 30 milliLiter(s) Oral every 4 hours PRN Dyspepsia  dextrose Oral Gel 15 Gram(s) Oral once PRN Blood Glucose LESS THAN 70 milliGRAM(s)/deciliter  melatonin 3 milliGRAM(s) Oral at bedtime PRN Insomnia  ondansetron Injectable 4 milliGRAM(s) IV Push every 8 hours PRN Nausea and/or Vomiting      Care Discussed with Consultants/Other Providers [ ] YES  [ ] NO

## 2024-03-17 LAB
ANION GAP SERPL CALC-SCNC: 13 MMOL/L — SIGNIFICANT CHANGE UP (ref 7–14)
APTT BLD: 32.4 SEC — SIGNIFICANT CHANGE UP (ref 24.5–35.6)
BASOPHILS # BLD AUTO: 0.03 K/UL — SIGNIFICANT CHANGE UP (ref 0–0.2)
BASOPHILS NFR BLD AUTO: 0.5 % — SIGNIFICANT CHANGE UP (ref 0–2)
BLD GP AB SCN SERPL QL: NEGATIVE — SIGNIFICANT CHANGE UP
BUN SERPL-MCNC: 54 MG/DL — HIGH (ref 7–23)
CALCIUM SERPL-MCNC: 8.9 MG/DL — SIGNIFICANT CHANGE UP (ref 8.4–10.5)
CHLORIDE SERPL-SCNC: 86 MMOL/L — LOW (ref 98–107)
CO2 SERPL-SCNC: 38 MMOL/L — HIGH (ref 22–31)
CREAT SERPL-MCNC: 1.54 MG/DL — HIGH (ref 0.5–1.3)
EGFR: 50 ML/MIN/1.73M2 — LOW
EOSINOPHIL # BLD AUTO: 0.16 K/UL — SIGNIFICANT CHANGE UP (ref 0–0.5)
EOSINOPHIL NFR BLD AUTO: 2.9 % — SIGNIFICANT CHANGE UP (ref 0–6)
GLUCOSE BLDC GLUCOMTR-MCNC: 149 MG/DL — HIGH (ref 70–99)
GLUCOSE BLDC GLUCOMTR-MCNC: 155 MG/DL — HIGH (ref 70–99)
GLUCOSE BLDC GLUCOMTR-MCNC: 169 MG/DL — HIGH (ref 70–99)
GLUCOSE BLDC GLUCOMTR-MCNC: 181 MG/DL — HIGH (ref 70–99)
GLUCOSE SERPL-MCNC: 165 MG/DL — HIGH (ref 70–99)
HCT VFR BLD CALC: 40.5 % — SIGNIFICANT CHANGE UP (ref 39–50)
HGB BLD-MCNC: 13.3 G/DL — SIGNIFICANT CHANGE UP (ref 13–17)
IANC: 2.38 K/UL — SIGNIFICANT CHANGE UP (ref 1.8–7.4)
IMM GRANULOCYTES NFR BLD AUTO: 0.2 % — SIGNIFICANT CHANGE UP (ref 0–0.9)
INR BLD: 1.13 RATIO — SIGNIFICANT CHANGE UP (ref 0.85–1.18)
LYMPHOCYTES # BLD AUTO: 2.15 K/UL — SIGNIFICANT CHANGE UP (ref 1–3.3)
LYMPHOCYTES # BLD AUTO: 39.1 % — SIGNIFICANT CHANGE UP (ref 13–44)
MAGNESIUM SERPL-MCNC: 2.2 MG/DL — SIGNIFICANT CHANGE UP (ref 1.6–2.6)
MCHC RBC-ENTMCNC: 29.2 PG — SIGNIFICANT CHANGE UP (ref 27–34)
MCHC RBC-ENTMCNC: 32.8 GM/DL — SIGNIFICANT CHANGE UP (ref 32–36)
MCV RBC AUTO: 88.8 FL — SIGNIFICANT CHANGE UP (ref 80–100)
MONOCYTES # BLD AUTO: 0.77 K/UL — SIGNIFICANT CHANGE UP (ref 0–0.9)
MONOCYTES NFR BLD AUTO: 14 % — SIGNIFICANT CHANGE UP (ref 2–14)
NEUTROPHILS # BLD AUTO: 2.38 K/UL — SIGNIFICANT CHANGE UP (ref 1.8–7.4)
NEUTROPHILS NFR BLD AUTO: 43.3 % — SIGNIFICANT CHANGE UP (ref 43–77)
NRBC # BLD: 0 /100 WBCS — SIGNIFICANT CHANGE UP (ref 0–0)
NRBC # FLD: 0 K/UL — SIGNIFICANT CHANGE UP (ref 0–0)
PHOSPHATE SERPL-MCNC: 3.4 MG/DL — SIGNIFICANT CHANGE UP (ref 2.5–4.5)
PLATELET # BLD AUTO: 234 K/UL — SIGNIFICANT CHANGE UP (ref 150–400)
POTASSIUM SERPL-MCNC: 3.4 MMOL/L — LOW (ref 3.5–5.3)
POTASSIUM SERPL-SCNC: 3.4 MMOL/L — LOW (ref 3.5–5.3)
PROTHROM AB SERPL-ACNC: 12.7 SEC — SIGNIFICANT CHANGE UP (ref 9.5–13)
RBC # BLD: 4.56 M/UL — SIGNIFICANT CHANGE UP (ref 4.2–5.8)
RBC # FLD: 14.4 % — SIGNIFICANT CHANGE UP (ref 10.3–14.5)
RH IG SCN BLD-IMP: POSITIVE — SIGNIFICANT CHANGE UP
SODIUM SERPL-SCNC: 137 MMOL/L — SIGNIFICANT CHANGE UP (ref 135–145)
WBC # BLD: 5.5 K/UL — SIGNIFICANT CHANGE UP (ref 3.8–10.5)
WBC # FLD AUTO: 5.5 K/UL — SIGNIFICANT CHANGE UP (ref 3.8–10.5)

## 2024-03-17 PROCEDURE — 99231 SBSQ HOSP IP/OBS SF/LOW 25: CPT

## 2024-03-17 RX ADMIN — Medication 1: at 09:58

## 2024-03-17 RX ADMIN — Medication 81 MILLIGRAM(S): at 12:37

## 2024-03-17 RX ADMIN — Medication 300 MILLIGRAM(S): at 05:58

## 2024-03-17 RX ADMIN — BUMETANIDE 2 MILLIGRAM(S): 0.25 INJECTION INTRAMUSCULAR; INTRAVENOUS at 05:58

## 2024-03-17 RX ADMIN — Medication 1: at 17:29

## 2024-03-17 RX ADMIN — BUMETANIDE 2 MILLIGRAM(S): 0.25 INJECTION INTRAMUSCULAR; INTRAVENOUS at 17:29

## 2024-03-17 RX ADMIN — Medication 1: at 13:00

## 2024-03-17 RX ADMIN — LOSARTAN POTASSIUM 25 MILLIGRAM(S): 100 TABLET, FILM COATED ORAL at 05:58

## 2024-03-17 RX ADMIN — SPIRONOLACTONE 50 MILLIGRAM(S): 25 TABLET, FILM COATED ORAL at 05:57

## 2024-03-17 NOTE — PROGRESS NOTE ADULT - SUBJECTIVE AND OBJECTIVE BOX
[facility] NEPHROLOGY- CONSULTATION NOTE    Patient is a 63y Male with baseline creatinine of ~1 has been hospitalized for ~2 weeks due to decompensated HF.  Pt has been on loop diuretics the entire admission including a bumetanide gtt from 3/8-3/12.  Creatinine had been at baseline until gtt started on 3/8 and then leveled off at 1.4-1.5 when the gtt was stopped on 3/12.  Renal fxn has been stable since then.  K+ has been intermittently low.      Pt feels well (and much better than on admission) and has no complaints.    REVIEW OF SYSTEMS:  CONSTITUTIONAL: No weakness, fevers or chills  RESPIRATORY: No cough, wheezing, hemoptysis; No shortness of breath  CARDIOVASCULAR: No chest pain or palpitations.  GASTROINTESTINAL: No abdominal or epigastric pain. No nausea, vomiting, or hematemesis; No diarrhea or constipation. No melena or hematochezia.  GENITOURINARY: No dysuria, frequency, foamy urine, urinary urgency, incontinence or hematuria    VASCULAR: +bilateral lower extremity edema.         VITALS:  T(F): 97.4 (03-17-24 @ 06:00), Max: 97.4 (03-17-24 @ 06:00)  HR: 68 (03-17-24 @ 06:00)  BP: 130/79 (03-17-24 @ 06:00)  RR: 16 (03-17-24 @ 06:00)  SpO2: 100% (03-17-24 @ 06:00)          PHYSICAL EXAM:  Constitutional: NAD  HEENT: anicteric sclera, oropharynx clear, MMM  Neck: No JVD  Respiratory: CTAB, no wheezes, rales or rhonchi  Cardiovascular: S1, S2, RRR  Gastrointestinal: BS+, soft, NT/ND  Extremities: No cyanosis or clubbing. + significant B LE edema, improving  Neurological: A/O x 3, no focal deficits  Psychiatric: Normal mood, normal affect  : No CVA tenderness. No thompson.           LABS:  03-17    137  |  86<L>  |  54<H>  ----------------------------<  165<H>  3.4<L>   |  38<H>  |  1.54<H>    Ca    8.9      17 Mar 2024 05:19  Phos  3.4     03-17  Mg     2.20     03-17      Creatinine Trend: 1.54 <--, 1.42 <--, 1.41 <--, 1.40 <--, 1.46 <--, 1.26 <--, 1.17 <--                        13.3   5.50  )-----------( 234      ( 17 Mar 2024 05:19 )             40.5     Urine Studies:  Urinalysis Basic - ( 17 Mar 2024 05:19 )    Color:  / Appearance:  / SG:  / pH:   Gluc: 165 mg/dL / Ketone:   / Bili:  / Urobili:    Blood:  / Protein:  / Nitrite:    Leuk Esterase:  / RBC:  / WBC    Sq Epi:  / Non Sq Epi:  / Bacteria:         03-17 @ 07:01  -  03-17 @ 17:17  --------------------------------------------------------  IN: 720 mL / OUT: 1200 mL / NET: -480 mL      RADIOLOGY & ADDITIONAL STUDIES:  < from: Transthoracic Echocardiogram (03.04.23 @ 07:44) >    Patient name: JOHN PAUL ARREAGA  YOB: 1960   Age: 62 (M)   MR#: [id number]  Study Date: 3/4/2023  [facility]  Study quality: Technically good  Referring Physician: Juanjose Zavala MD  Blood Pressure: 167/107 mmHg  Height: 175 cm  Weight: 100 kg  BSA: 2.2 m2  ------------------------------------------------------------------------  PROCEDURE: Transthoracic echocardiogram with 2-D, M-Mode  and complete spectral and color flow Doppler.  INDICATION: Heart failure, unspecified (I50.9)  ------------------------------------------------------------------------  DIMENSIONS:  Dimensions:     Normal Values:  LA:     4.9 cm    2.0 - 4.0 cm  Ao:     3.2 cm    2.0 - 3.8 cm  SEPTUM: 1.1 cm    0.6 - 1.2 cm  PWT:    1.1 cm    0.6 - 1.1 cm  LVIDd:  6.6 cm    3.0 - 5.6 cm  LVIDs:  5.2 cm    1.8 - 4.0 cm  Derived Variables:  LVMI: 153 g/m2  RWT: 0.33  Fractional short: 21 %  Ejection Fraction (Modified Suero Rule): 33 %  ------------------------------------------------------------------------  OBSERVATIONS:  Mitral Valve: Tethered mitral valve leaflets with normal  opening. Mild mitral regurgitation.  Aortic Root: Normal aortic root.  Aortic Valve: Normal trileaflet aortic valve. Minimal  aortic regurgitation.  Left Atrium: Moderately dilated left atrium.  LA volume  index = 44 cc/m2.  Left Ventricle: Severe global left ventricular systolic  dysfunction. Normal left ventricular internal dimensions  and wall thicknesses.  Right Heart: Normal right atrium. Normal right ventricular  size and function. Normal tricuspid valve. Minimal  tricuspid regurgitation. Normal pulmonic valve.  Pericardium/PleuraNormal pericardium with no pericardial  effusion.  Hemodynamic: Estimated right ventricular systolic pressure  equals 27 mm Hg, assuming right atrial pressure equals 10  mm Hg, consistent with normal pulmonary pressures.  ------------------------------------------------------------------------  CONCLUSIONS:  1. Tethered mitral valve leaflets with normal opening.  2. Moderately dilated left atrium.  LA volume index = 44  cc/m2.  3. Severe global left ventricular systolic dysfunction.  4. Normal right ventricular size and function.  5. Normal tricuspid valve. Minimal tricuspid regurgitation.  6. Estimated pulmonary artery systolic pressure equals 27  mm Hg, assuming right atrial pressure equals 10  mm Hg,  consistent with normal pulmonary pressures.  *** No previous Echo exam.  ------------------------------------------------------------------------  Confirmed on  3/4/2023 - 12:09:10 by Shyann Gutierrez MD  ------------------------------------------------------------------------    < end of copied text >      < from: Xray Chest 2 Views PA/Lat (03.02.24 @ 12:19) >    ACC: [id number] EXAM:  XR CHEST PA LAT 2V   ORDERED BY: LUIS COCHRAN     PROCEDURE DATE:  03/02/2024          INTERPRETATION:  CLINICAL HISTORY: Shortness of Breath.    COMPARISON: Chest radiograph dated 3/3/2023.    TECHNIQUE: Frontal and lateral chest radiographs. Adequate positioning.    FINDINGS:    Support devices: None.    Cardiac/mediastinum/hilum: The cardiomediastinal silhouette is enlarged.    Lung parenchyma/Pleura: No focal consolidation, pleural effusion, or   pneumothorax. Nopulmonary vascular congestion.    Skeleton/soft tissues: No acute osseous deformity.      IMPRESSION:    Cardiomegaly.    --- End of Report ---          ARIANNA RIVERA DO; Resident Radiologist  This document has been electronically signed.  ETTA YU MD; Attending Radiologist  This document has been electronically signed. Mar  2 2024 12:33PM    < end of copied text >

## 2024-03-17 NOTE — PROGRESS NOTE ADULT - SUBJECTIVE AND OBJECTIVE BOX
Cardiology Progress Note    Patient seen and examined at bedside.    Overnight Events:   NAEO  Tele: SR 60-70s, 4 beats of NSVT    Review Of Systems: No chest pain, shortness of breath, or palpitations            Current Meds:  acetaminophen     Tablet .. 650 milliGRAM(s) Oral every 6 hours PRN  aluminum hydroxide/magnesium hydroxide/simethicone Suspension 30 milliLiter(s) Oral every 4 hours PRN  aspirin enteric coated 81 milliGRAM(s) Oral daily  buMETAnide 2 milliGRAM(s) Oral two times a day  dextrose 5%. 1000 milliLiter(s) IV Continuous <Continuous>  dextrose 5%. 1000 milliLiter(s) IV Continuous <Continuous>  dextrose 50% Injectable 25 Gram(s) IV Push once  dextrose 50% Injectable 25 Gram(s) IV Push once  dextrose 50% Injectable 12.5 Gram(s) IV Push once  dextrose Oral Gel 15 Gram(s) Oral once PRN  glucagon  Injectable 1 milliGRAM(s) IntraMuscular once  influenza   Vaccine 0.5 milliLiter(s) IntraMuscular once  insulin lispro (ADMELOG) corrective regimen sliding scale   SubCutaneous at bedtime  insulin lispro (ADMELOG) corrective regimen sliding scale   SubCutaneous three times a day before meals  losartan 25 milliGRAM(s) Oral daily  melatonin 3 milliGRAM(s) Oral at bedtime PRN  metoprolol succinate  milliGRAM(s) Oral daily  ondansetron Injectable 4 milliGRAM(s) IV Push every 8 hours PRN  spironolactone 50 milliGRAM(s) Oral daily      Vitals:  T(F): 97.4 (03-17), Max: 97.4 (03-17)  HR: 68 (03-17) (68 - 77)  BP: 130/79 (03-17) (100/65 - 130/79)  RR: 16 (03-17)  SpO2: 100% (03-17)  I&O's Summary    Physical Exam:  GENERAL: In no apparent distress, well nourished, and hydrated.  HEART: Regular rate and rhythm; No murmurs, rubs, or gallops.  PULMONARY: Clear to auscultation and percussion.  No rales, wheezing, or rhonchi bilaterally.  ABDOMEN: Soft, Nontender, Nondistended; Bowel sounds present  EXTREMITIES:  2+ Peripheral Pulses, No clubbing, cyanosis, or edema  NEUROLOGICAL: Grossly nonfocal                          13.3   5.50  )-----------( 234      ( 17 Mar 2024 05:19 )             40.5     03-17    137  |  86<L>  |  54<H>  ----------------------------<  165<H>  3.4<L>   |  38<H>  |  1.54<H>    Ca    8.9      17 Mar 2024 05:19  Phos  3.4     03-17  Mg     2.20     03-17

## 2024-03-17 NOTE — PROGRESS NOTE ADULT - ASSESSMENT
63 year-old man likely with CKD 3a in the setting of CHF.    1. CKD 3a- it is likely that pt has CKD 3a in the setting of CHF.  Lower creatinines probably represented a state of volume-overload and pt now seems euvolemic following the bumetanide gtt and has stable renal fxn.  Would not consider this DONNA and would consider baseline creatinine to be 1.3-1.4.  Okay to continue ARB and spironolactone as well as bumetanide.    2. Hypokalemia- extended use of high dose loop diuretics likely have depleted intracellular potassium stores.  Will likely need a fairly high dose of K-supplementation to replete, but would be careful that pt does not develop hyperkalemia once replete.  Agree with increasing spironolactone.  Would hold metolazone for now though okay to give as outpatient once K+ is replete.  Continue to replete K+ on a prn basis.  Monitor K+.    3. Metabolic alkalosis in the setting of diuresis-  will give acetazolamide once K+ has improved.    4. Acute on chronic systolic HF-  diuretics as above.  Other cardiac medications per cardiology.    [facility] NEPHROLOGY  Adi Diaz M.D.  Nj Abarca D.O.  Kezia Dunn M.D.  MD Ute Humphrey, MSN, ANP-C    Telephone: [phone]  Facsimile: [phone]    [facility] [place] [address], [address]  [place] [address]

## 2024-03-17 NOTE — PROGRESS NOTE ADULT - ATTENDING COMMENTS
64 y/o male with PMHx of HTN, NICM (EF 27%), T2DM on metformin who presented with acute on chronic ADHF. worsening since Thanksgiving 2023, and Pt endorses 2 falls due to lower extremity weakness but denies syncope. Per records patient noncompliant, hospitalization for HF in 2023, NSVT ( telemetry : longest run 39 beats). Of note, TTE: LVEF 27% RV dysfunction, LHC: coronaries with minor luminal irregularities, EKG from 3/2023 without RBBB/LBBB; normal QRS duration. Patient on aggressive diureses. EP consulted for ICD therapy and deemed pt. is not a candidate for ICD given his noncompliance and not on GDMT x 3 months. However tele reveals patient to be having episodes of NSVT, 3-20 beat duration, asymptomatic, and EP is asked to re-evaluate for secondary prevention. Plan for ICD implant.
62 yo man with hx HTN, NICM (EF 27%), T2DM on metformin who presented with acute on chronic ADHF. Ep called for ICD evaluation as having NSVT. NSVT in the setting of significant volume overload. [place] improved with diuresis. He is not on good chronic therapy. Would hold off ICD at this time and continue with diuresis as I suspect rhythm issues will resolve with better optimization.

## 2024-03-17 NOTE — PROGRESS NOTE ADULT - SUBJECTIVE AND OBJECTIVE BOX
Patient is a 63y old  Male who presents with a chief complaint of Acute decompensated heart failure (17 Mar 2024 17:16)      INTERVAL HPI/OVERNIGHT EVENTS: seen and examined, agree for ICD implant : scheduled in am   T(C): 36.3 (03-17-24 @ 06:00), Max: 36.3 (03-17-24 @ 06:00)  HR: 68 (03-17-24 @ 06:00) (68 - 77)  BP: 130/79 (03-17-24 @ 06:00) (100/65 - 130/79)  RR: 16 (03-17-24 @ 06:00) (16 - 18)  SpO2: 100% (03-17-24 @ 06:00) (99% - 100%)  Wt(kg): --  I&O's Summary    17 Mar 2024 07:01  -  17 Mar 2024 17:44  --------------------------------------------------------  IN: 720 mL / OUT: 1200 mL / NET: -480 mL        PAST MEDICAL & SURGICAL HISTORY:  HTN (hypertension)      HLD (hyperlipidemia)      DM (diabetes mellitus)      HFrEF (heart failure with reduced ejection fraction)      No significant past surgical history          SOCIAL HISTORY  Alcohol:  Tobacco:  Illicit substance use:    FAMILY HISTORY:    REVIEW OF SYSTEMS:  CONSTITUTIONAL: No fever, weight loss, or fatigue  EYES: No eye pain, visual disturbances, or discharge  ENMT:  No difficulty hearing, tinnitus, vertigo; No sinus or throat pain  NECK: No pain or stiffness  RESPIRATORY: No cough, wheezing, chills or hemoptysis; No shortness of breath  CARDIOVASCULAR: No chest pain, palpitations, dizziness, or leg swelling  GASTROINTESTINAL: No abdominal or epigastric pain. No nausea, vomiting, or hematemesis; No diarrhea or constipation. No melena or hematochezia.  GENITOURINARY: No dysuria, frequency, hematuria, or incontinence  NEUROLOGICAL: No headaches, memory loss, loss of strength, numbness, or tremors  SKIN: No itching, burning, rashes, or lesions   LYMPH NODES: No enlarged glands  ENDOCRINE: No heat or cold intolerance; No hair loss  MUSCULOSKELETAL: No joint pain or swelling; No muscle, back, or extremity pain  PSYCHIATRIC: No depression, anxiety, mood swings, or difficulty sleeping  HEME/LYMPH: No easy bruising, or bleeding gums  ALLERY AND IMMUNOLOGIC: No hives or eczema    RADIOLOGY & ADDITIONAL TESTS:    Imaging Personally Reviewed:  [ ] YES  [ ] NO    Consultant(s) Notes Reviewed:  [ ] YES  [ ] NO    PHYSICAL EXAM:  GENERAL: NAD, well-groomed, well-developed  HEAD:  Atraumatic, Normocephalic  EYES: EOMI, PERRLA, conjunctiva and sclera clear  ENMT: No tonsillar erythema, exudates, or enlargement; Moist mucous membranes, Good dentition, No lesions  NECK: Supple, No JVD, Normal thyroid  NERVOUS SYSTEM:  Alert & Oriented X3, Good concentration; Motor Strength 5/5 B/L upper and lower extremities; DTRs 2+ intact and symmetric  CHEST/LUNG: Clear to percussion bilaterally; No rales, rhonchi, wheezing, or rubs  HEART: Regular rate and rhythm; No murmurs, rubs, or gallops  ABDOMEN: Soft, Nontender, Nondistended; Bowel sounds present  EXTREMITIES:  2+ Peripheral Pulses, No clubbing, cyanosis, or edema  LYMPH: No lymphadenopathy noted  SKIN: No rashes or lesions    LABS:                        13.3   5.50  )-----------( 234      ( 17 Mar 2024 05:19 )             40.5     03-17    137  |  86<L>  |  54<H>  ----------------------------<  165<H>  3.4<L>   |  38<H>  |  1.54<H>    Ca    8.9      17 Mar 2024 05:19  Phos  3.4     03-17  Mg     2.20     03-17      PT/INR - ( 17 Mar 2024 13:23 )   PT: 12.7 sec;   INR: 1.13 ratio         PTT - ( 17 Mar 2024 13:23 )  PTT:32.4 sec  Urinalysis Basic - ( 17 Mar 2024 05:19 )    Color: x / Appearance: x / SG: x / pH: x  Gluc: 165 mg/dL / Ketone: x  / Bili: x / Urobili: x   Blood: x / Protein: x / Nitrite: x   Leuk Esterase: x / RBC: x / WBC x   Sq Epi: x / Non Sq Epi: x / Bacteria: x      CAPILLARY BLOOD GLUCOSE      POCT Blood Glucose.: 181 mg/dL (17 Mar 2024 17:13)  POCT Blood Glucose.: 169 mg/dL (17 Mar 2024 12:51)  POCT Blood Glucose.: 155 mg/dL (17 Mar 2024 09:55)  POCT Blood Glucose.: 129 mg/dL (16 Mar 2024 21:56)  POCT Blood Glucose.: 126 mg/dL (16 Mar 2024 21:39)  POCT Blood Glucose.: 142 mg/dL (16 Mar 2024 17:58)        Urinalysis Basic - ( 17 Mar 2024 05:19 )    Color: x / Appearance: x / SG: x / pH: x  Gluc: 165 mg/dL / Ketone: x  / Bili: x / Urobili: x   Blood: x / Protein: x / Nitrite: x   Leuk Esterase: x / RBC: x / WBC x   Sq Epi: x / Non Sq Epi: x / Bacteria: x        MEDICATIONS  (STANDING):  aspirin enteric coated 81 milliGRAM(s) Oral daily  buMETAnide 2 milliGRAM(s) Oral two times a day  dextrose 5%. 1000 milliLiter(s) (50 mL/Hr) IV Continuous <Continuous>  dextrose 5%. 1000 milliLiter(s) (100 mL/Hr) IV Continuous <Continuous>  dextrose 50% Injectable 25 Gram(s) IV Push once  dextrose 50% Injectable 12.5 Gram(s) IV Push once  dextrose 50% Injectable 25 Gram(s) IV Push once  glucagon  Injectable 1 milliGRAM(s) IntraMuscular once  influenza   Vaccine 0.5 milliLiter(s) IntraMuscular once  insulin lispro (ADMELOG) corrective regimen sliding scale   SubCutaneous three times a day before meals  insulin lispro (ADMELOG) corrective regimen sliding scale   SubCutaneous at bedtime  losartan 25 milliGRAM(s) Oral daily  metoprolol succinate  milliGRAM(s) Oral daily  spironolactone 50 milliGRAM(s) Oral daily    MEDICATIONS  (PRN):  acetaminophen     Tablet .. 650 milliGRAM(s) Oral every 6 hours PRN Temp greater or equal to 38C (100.4F), Mild Pain (1 - 3)  aluminum hydroxide/magnesium hydroxide/simethicone Suspension 30 milliLiter(s) Oral every 4 hours PRN Dyspepsia  dextrose Oral Gel 15 Gram(s) Oral once PRN Blood Glucose LESS THAN 70 milliGRAM(s)/deciliter  melatonin 3 milliGRAM(s) Oral at bedtime PRN Insomnia  ondansetron Injectable 4 milliGRAM(s) IV Push every 8 hours PRN Nausea and/or Vomiting      Care Discussed with Consultants/Other Providers [ ] YES  [ ] NO

## 2024-03-17 NOTE — PROGRESS NOTE ADULT - ASSESSMENT
63 yr old male with a pmh of HTN, HF rEF ( EF33%), T2DM on metformin who presents with ROSS and bilateral lower extremity edema with his legs feeling heavy.     #Acute on chronic systolic heart Failure : due to non complaince with meds : d/w pt and niece need to adhere with meds and need for AICD after re-evaluation of compliance   cont diuresis   on bumex 2 mg now PO bid   metolazone 2.5 mg x 3 times/ week   losartan started   c/w Toprol  mg daily   Cardio following   EF 27%  started on aldactone 50 mg daily         #syncope  #NSVT on tele   on BBlocker   seen by EP   still having frequent ectopy on tele monitor : recommend AICD during this admission : if patient agrees   c/w tele monitor      #Hypertensive   controlled now     #DM-2   a1c 7.1  c/w Insulin / FSBS    #HLD  -cont statin    # non compliance with meds :  counseling done     dispo: pt. agrees , scheduled for ICD implant in am by EP cardio

## 2024-03-17 NOTE — PROGRESS NOTE ADULT - SUBJECTIVE AND OBJECTIVE BOX
CARDIOLOGY FOLLOW UP NOTE - DR. REYES    Patient Name: JOHN PAUL ARREAGA    Date of Service: 03-17-24 @ 10:59    Patient seen and examined    Subjective:    cv: denies chest pain, dyspnea, palpitations, dizziness  pulmonary: denies cough  GI: denies abdominal pain, nausea, vomiting  vascular/legs: no edema   skin: no rash  ROS: otherwise negative   overnight events:      PHYSICAL EXAM:  T(C): 36.3 (03-17-24 @ 06:00), Max: 36.3 (03-17-24 @ 06:00)  HR: 68 (03-17-24 @ 06:00) (68 - 77)  BP: 130/79 (03-17-24 @ 06:00) (100/65 - 130/79)  RR: 16 (03-17-24 @ 06:00) (16 - 18)  SpO2: 100% (03-17-24 @ 06:00) (99% - 100%)  Wt(kg): --  I&O's Summary    Daily     Daily     Appearance: Normal	  Cardiovascular: Normal S1 S2,RRR, No JVD, No murmurs  Respiratory: Lungs clear to auscultation	  Gastrointestinal:  Soft, Non-tender, + BS	  Extremities: Normal range of motion, No clubbing, cyanosis or edema      Home Medications:  METFORMIN HCL 1,000 MG TABLET: TAKE 1 TABLET BY MOUTH TWICE A DAY WITH MEALS (02 Mar 2024 20:59)  METOPROLOL SUCC ER 50 MG TAB: TAKE 1 TABLET BY MOUTH EVERY DAY (02 Mar 2024 20:59)      MEDICATIONS  (STANDING):  aspirin enteric coated 81 milliGRAM(s) Oral daily  buMETAnide 2 milliGRAM(s) Oral two times a day  dextrose 5%. 1000 milliLiter(s) (100 mL/Hr) IV Continuous <Continuous>  dextrose 5%. 1000 milliLiter(s) (50 mL/Hr) IV Continuous <Continuous>  dextrose 50% Injectable 25 Gram(s) IV Push once  dextrose 50% Injectable 12.5 Gram(s) IV Push once  dextrose 50% Injectable 25 Gram(s) IV Push once  glucagon  Injectable 1 milliGRAM(s) IntraMuscular once  influenza   Vaccine 0.5 milliLiter(s) IntraMuscular once  insulin lispro (ADMELOG) corrective regimen sliding scale   SubCutaneous at bedtime  insulin lispro (ADMELOG) corrective regimen sliding scale   SubCutaneous three times a day before meals  losartan 25 milliGRAM(s) Oral daily  metoprolol succinate  milliGRAM(s) Oral daily  spironolactone 50 milliGRAM(s) Oral daily      TELEMETRY: 	    ECG:  	  RADIOLOGY:   DIAGNOSTIC TESTING:  [ ] Echocardiogram:  [ ] Catheterization:  [ ] Stress Test:    OTHER: 	    LABS:	 	    CARDIAC MARKERS:                                      13.3   5.50  )-----------( 234      ( 17 Mar 2024 05:19 )             40.5     03-17    137  |  86<L>  |  54<H>  ----------------------------<  165<H>  3.4<L>   |  38<H>  |  1.54<H>    Ca    8.9      17 Mar 2024 05:19  Phos  3.4     03-17  Mg     2.20     03-17      proBNP:     Lipid Profile:   HgA1c:     Creatinine: 1.54 mg/dL (03-17-24 @ 05:19)  Creatinine: 1.42 mg/dL (03-16-24 @ 07:30)  Creatinine: 1.41 mg/dL (03-15-24 @ 06:55)

## 2024-03-17 NOTE — PROGRESS NOTE ADULT - ASSESSMENT
62 y/o male with PMHx of HTN, NICM (EF 27%), T2DM on metformin who presented with acute on chronic ADHF. worsening since Thanksgiving 2023, and Pt endorses 2 falls due to lower extremity weakness but denies syncope. Per records patient noncompliant, hospitalization for HF in 2023, NSVT ( telemetry : longest run 39 beats). Of note, TTE: LVEF 27% RV dysfunction, LHC: coronaries with minor luminal irregularities, EKG from 3/2023 without RBBB/LBBB; normal QRS duration. Patient on aggressive diureses. EP consulted for ICD therapy and deemed pt. is not a candidate for ICD given his noncompliance and not on GDMT x 3 months. However tele reveals patient to be having episodes of NSVT, 3-20 beat duration, asymptomatic, and EP is asked to re-evaluate for secondary prevention.     #HFrEF/NICM  #NSVT  - Continue to monitor on tele  - Replete K to 4 and Mg to 2  - Continue metoprolol succinate  mg QD  - Continue GDMT for HF per cardiology recommendations  - Plan to ICD implant tomorrow  -- Ensure NPO at midnight  -- Ensure PT/INR and PTT  -- Ensure 2 active type and screens     Julio Rabago MD  Department of Cardiology  Cardiology Fellow, PGY6

## 2024-03-18 LAB
ANION GAP SERPL CALC-SCNC: 8 MMOL/L — SIGNIFICANT CHANGE UP (ref 7–14)
BASOPHILS # BLD AUTO: 0 K/UL — SIGNIFICANT CHANGE UP (ref 0–0.2)
BASOPHILS NFR BLD AUTO: 0 % — SIGNIFICANT CHANGE UP (ref 0–2)
BUN SERPL-MCNC: 59 MG/DL — HIGH (ref 7–23)
CALCIUM SERPL-MCNC: 8.9 MG/DL — SIGNIFICANT CHANGE UP (ref 8.4–10.5)
CHLORIDE SERPL-SCNC: 87 MMOL/L — LOW (ref 98–107)
CO2 SERPL-SCNC: 40 MMOL/L — HIGH (ref 22–31)
CREAT SERPL-MCNC: 1.44 MG/DL — HIGH (ref 0.5–1.3)
EGFR: 54 ML/MIN/1.73M2 — LOW
EOSINOPHIL # BLD AUTO: 0.16 K/UL — SIGNIFICANT CHANGE UP (ref 0–0.5)
EOSINOPHIL NFR BLD AUTO: 3.3 % — SIGNIFICANT CHANGE UP (ref 0–6)
GIANT PLATELETS BLD QL SMEAR: PRESENT — SIGNIFICANT CHANGE UP
GLUCOSE BLDC GLUCOMTR-MCNC: 118 MG/DL — HIGH (ref 70–99)
GLUCOSE BLDC GLUCOMTR-MCNC: 120 MG/DL — HIGH (ref 70–99)
GLUCOSE BLDC GLUCOMTR-MCNC: 129 MG/DL — HIGH (ref 70–99)
GLUCOSE BLDC GLUCOMTR-MCNC: 171 MG/DL — HIGH (ref 70–99)
GLUCOSE SERPL-MCNC: 139 MG/DL — HIGH (ref 70–99)
HCT VFR BLD CALC: 39.1 % — SIGNIFICANT CHANGE UP (ref 39–50)
HGB BLD-MCNC: 12.4 G/DL — LOW (ref 13–17)
IANC: 1.92 K/UL — SIGNIFICANT CHANGE UP (ref 1.8–7.4)
LYMPHOCYTES # BLD AUTO: 1.4 K/UL — SIGNIFICANT CHANGE UP (ref 1–3.3)
LYMPHOCYTES # BLD AUTO: 28.9 % — SIGNIFICANT CHANGE UP (ref 13–44)
MAGNESIUM SERPL-MCNC: 2.1 MG/DL — SIGNIFICANT CHANGE UP (ref 1.6–2.6)
MCHC RBC-ENTMCNC: 28.4 PG — SIGNIFICANT CHANGE UP (ref 27–34)
MCHC RBC-ENTMCNC: 31.7 GM/DL — LOW (ref 32–36)
MCV RBC AUTO: 89.7 FL — SIGNIFICANT CHANGE UP (ref 80–100)
MONOCYTES # BLD AUTO: 0.68 K/UL — SIGNIFICANT CHANGE UP (ref 0–0.9)
MONOCYTES NFR BLD AUTO: 14 % — SIGNIFICANT CHANGE UP (ref 2–14)
NEUTROPHILS # BLD AUTO: 2.36 K/UL — SIGNIFICANT CHANGE UP (ref 1.8–7.4)
NEUTROPHILS NFR BLD AUTO: 48.8 % — SIGNIFICANT CHANGE UP (ref 43–77)
NRBC # BLD: 1 /100 WBCS — HIGH (ref 0–0)
OVALOCYTES BLD QL SMEAR: SLIGHT — SIGNIFICANT CHANGE UP
PHOSPHATE SERPL-MCNC: 3.7 MG/DL — SIGNIFICANT CHANGE UP (ref 2.5–4.5)
PLAT MORPH BLD: NORMAL — SIGNIFICANT CHANGE UP
PLATELET # BLD AUTO: 252 K/UL — SIGNIFICANT CHANGE UP (ref 150–400)
PLATELET COUNT - ESTIMATE: NORMAL — SIGNIFICANT CHANGE UP
POTASSIUM SERPL-MCNC: 3.5 MMOL/L — SIGNIFICANT CHANGE UP (ref 3.5–5.3)
POTASSIUM SERPL-SCNC: 3.5 MMOL/L — SIGNIFICANT CHANGE UP (ref 3.5–5.3)
RBC # BLD: 4.36 M/UL — SIGNIFICANT CHANGE UP (ref 4.2–5.8)
RBC # FLD: 14.1 % — SIGNIFICANT CHANGE UP (ref 10.3–14.5)
RBC BLD AUTO: NORMAL — SIGNIFICANT CHANGE UP
SMUDGE CELLS # BLD: PRESENT — SIGNIFICANT CHANGE UP
SODIUM SERPL-SCNC: 135 MMOL/L — SIGNIFICANT CHANGE UP (ref 135–145)
VARIANT LYMPHS # BLD: 5 % — SIGNIFICANT CHANGE UP (ref 0–6)
WBC # BLD: 4.84 K/UL — SIGNIFICANT CHANGE UP (ref 3.8–10.5)
WBC # FLD AUTO: 4.84 K/UL — SIGNIFICANT CHANGE UP (ref 3.8–10.5)

## 2024-03-18 PROCEDURE — 93010 ELECTROCARDIOGRAM REPORT: CPT

## 2024-03-18 PROCEDURE — 99232 SBSQ HOSP IP/OBS MODERATE 35: CPT

## 2024-03-18 RX ORDER — POTASSIUM CHLORIDE 20 MEQ
40 PACKET (EA) ORAL ONCE
Refills: 0 | Status: COMPLETED | OUTPATIENT
Start: 2024-03-18 | End: 2024-03-18

## 2024-03-18 RX ORDER — POTASSIUM CHLORIDE 20 MEQ
10 PACKET (EA) ORAL
Refills: 0 | Status: COMPLETED | OUTPATIENT
Start: 2024-03-18 | End: 2024-03-18

## 2024-03-18 RX ADMIN — BUMETANIDE 2 MILLIGRAM(S): 0.25 INJECTION INTRAMUSCULAR; INTRAVENOUS at 14:04

## 2024-03-18 RX ADMIN — LOSARTAN POTASSIUM 25 MILLIGRAM(S): 100 TABLET, FILM COATED ORAL at 05:59

## 2024-03-18 RX ADMIN — Medication 100 MILLIEQUIVALENT(S): at 10:08

## 2024-03-18 RX ADMIN — Medication 300 MILLIGRAM(S): at 06:00

## 2024-03-18 RX ADMIN — Medication 100 MILLIEQUIVALENT(S): at 09:03

## 2024-03-18 RX ADMIN — SPIRONOLACTONE 50 MILLIGRAM(S): 25 TABLET, FILM COATED ORAL at 06:00

## 2024-03-18 RX ADMIN — BUMETANIDE 2 MILLIGRAM(S): 0.25 INJECTION INTRAMUSCULAR; INTRAVENOUS at 05:59

## 2024-03-18 RX ADMIN — Medication 100 MILLIEQUIVALENT(S): at 07:06

## 2024-03-18 RX ADMIN — Medication 40 MILLIEQUIVALENT(S): at 06:16

## 2024-03-18 RX ADMIN — Medication 81 MILLIGRAM(S): at 12:57

## 2024-03-18 NOTE — PROGRESS NOTE ADULT - SUBJECTIVE AND OBJECTIVE BOX
CARDIOLOGY FOLLOW UP - Dr. Zavala  Date of Service: 3/18/2024  CC: tele events noted    Review of Systems:  Constitutional: No fever, weight loss, or fatigue  Respiratory: No cough, wheezing, or hemoptysis, no shortness of breath  Cardiovascular: No chest pain, palpitations, passing out, dizziness, or leg swelling  Gastrointestinal: No abd or epigastric pain. No nausea, vomiting, or hematemesis; no diarrhea or consiptaiton, no melena or hematochezia  Vascular: No edema     TELEMETRY:    PHYSICAL EXAM:  T(C): 36.9 (03-18-24 @ 05:55), Max: 36.9 (03-17-24 @ 20:07)  HR: 71 (03-18-24 @ 05:55) (67 - 84)  BP: 122/75 (03-18-24 @ 05:55) (95/65 - 122/75)  RR: 18 (03-18-24 @ 05:55) (16 - 18)  SpO2: 100% (03-18-24 @ 05:55) (100% - 100%)  Wt(kg): --  I&O's Summary    17 Mar 2024 07:01  -  18 Mar 2024 07:00  --------------------------------------------------------  IN: 720 mL / OUT: 2300 mL / NET: -1580 mL        Appearance: Normal	  Cardiovascular: Normal S1 S2,RRR, No JVD, No murmurs  Respiratory: Lungs clear to auscultation	  Gastrointestinal:  Soft, Non-tender, + BS	  Extremities: Normal range of motion, No clubbing, cyanosis or edema  Vascular: Peripheral pulses palpable 2+ bilaterally       Home Medications:  METFORMIN HCL 1,000 MG TABLET: TAKE 1 TABLET BY MOUTH TWICE A DAY WITH MEALS (02 Mar 2024 20:59)  METOPROLOL SUCC ER 50 MG TAB: TAKE 1 TABLET BY MOUTH EVERY DAY (02 Mar 2024 20:59)        MEDICATIONS  (STANDING):  aspirin enteric coated 81 milliGRAM(s) Oral daily  buMETAnide 2 milliGRAM(s) Oral two times a day  dextrose 5%. 1000 milliLiter(s) (100 mL/Hr) IV Continuous <Continuous>  dextrose 5%. 1000 milliLiter(s) (50 mL/Hr) IV Continuous <Continuous>  dextrose 50% Injectable 25 Gram(s) IV Push once  dextrose 50% Injectable 12.5 Gram(s) IV Push once  dextrose 50% Injectable 25 Gram(s) IV Push once  glucagon  Injectable 1 milliGRAM(s) IntraMuscular once  influenza   Vaccine 0.5 milliLiter(s) IntraMuscular once  insulin lispro (ADMELOG) corrective regimen sliding scale   SubCutaneous three times a day before meals  insulin lispro (ADMELOG) corrective regimen sliding scale   SubCutaneous at bedtime  losartan 25 milliGRAM(s) Oral daily  metoprolol succinate  milliGRAM(s) Oral daily  spironolactone 50 milliGRAM(s) Oral daily        EKG:  RADIOLOGY:  DIAGNOSTIC TESTING:  [ ] Echocardiogram:  [ ] Catherterization:  [ ] Stress Test:  OTHER:     LABS:	 	                          12.4   4.84  )-----------( 252      ( 18 Mar 2024 04:12 )             39.1     03-18    135  |  87<L>  |  59<H>  ----------------------------<  139<H>  3.5   |  40<H>  |  1.44<H>    Ca    8.9      18 Mar 2024 04:12  Phos  3.7     03-18  Mg     2.10     03-18        PT/INR - ( 17 Mar 2024 13:23 )   PT: 12.7 sec;   INR: 1.13 ratio         PTT - ( 17 Mar 2024 13:23 )  PTT:32.4 sec    CARDIAC MARKERS:

## 2024-03-18 NOTE — CHART NOTE - NSCHARTNOTEFT_GEN_A_CORE
Notified by primary RN that patient had 46  beats of NSVT, and patient is  now back to baseline rhythm. Assessed the patient at bedside. Patient was  asymptomatic, was sleeping during the time of episode. VSS . 12 lead EKG ordered. Advised to send Am Mag, Phos, and BMP stat and recommended to give am dose of lopressor early now. RN to notify with any acute changes Notified by primary RN that patient had 46  beats of NSVT, and patient is  now back to baseline rhythm.     Patient is 62 y/o male with PMHx of HTN, NICM (EF 27%), T2DM on metformin who admitted  with acute on chronic ADHF.  Per records patient noncompliant, hospitalization for HF in 2023, also noted to have NSVTs ( telemetry : longest run 39 beats so far) planning for ICD by EP today on 3/18.     Assessed the patient at bedside. Patient was  asymptomatic, denies any chest pain, SOB, palpitations and dizziness. Pt reports that he was moving in bed at the time of the episode . VSS . Reviewed telemetry which is noted with 20 seconds of runs of Vtach which is counted as 46 beats of NSVT.      T(C): 36.9 (03-17-24 @ 20:07), Max: 36.9 (03-17-24 @ 20:07)  HR: 84 (03-17-24 @ 20:07) (70 - 84)  BP: 114/63 (03-17-24 @ 20:07) (108/77 - 120/70)  RR: 17 (03-17-24 @ 20:07) (16 - 17)  SpO2: 100% (03-17-24 @ 20:07) (100% - 100%)    Physical Exam:  GENERAL: No distress, well nourished, and hydrated.  HEART: Regular rate and rhythm; No murmurs, rubs, or gallops.  PULMONARY: Clear to auscultation and percussion.  No rales, wheezing, or rhonchi bilaterally.  ABDOMEN: Soft, Nontender, Nondistended; Bowel sounds present  EXTREMITIES:  2+ Peripheral Pulses, No clubbing, cyanosis, or edema  NEUROLOGICAL: Grossly nonfocal    Assessment:  Patient is 62 y/o male with PMHx of HTN, NICM (EF 27%), T2DM on metformin who admitted  with acute on chronic ADHF, and NSVT now with NSVT up to 46 beats.   Plan  -12 Lead EKG- SR first degree @67/mt.No new ischemic changes  - BMP , Mg and phos Stat- electrolytes repletion ordered  - Administer Toprol 300 mg Am dose early   - Continue to keep NPO for ICD placement today on 3/18    Addendum:  Discussed with Dr Zavala, Agreed with the above plan

## 2024-03-18 NOTE — PROGRESS NOTE ADULT - ASSESSMENT
63 yr old male with a pmh of HTN, HF rEF ( EF33%), T2DM on metformin who presents with ROSS and bilateral lower extremity edema with his legs feeling heavy.     #Acute on chronic systolic heart Failure : due to non complaince with meds : d/w pt and niece need to adhere with meds and need for AICD after re-evaluation of compliance   cont diuresis   on bumex 2 mg now PO bid   metolazone 2.5 mg x 3 times/ week   losartan started   c/w Toprol  mg daily   Cardio following   EF 27%  started on aldactone 50 mg daily         #syncope  #NSVT on tele   on BBlocker   seen by EP   still having frequent ectopy on tele monitor : recommend AICD during this admission : if patient agrees   c/w tele monitor      #Hypertensive   controlled now     #DM-2   a1c 7.1  c/w Insulin / FSBS    #HLD  -cont statin    # non compliance with meds :  counseling done     dispo: scheduled for ICD implant today : as per EP

## 2024-03-18 NOTE — PROGRESS NOTE ADULT - ASSESSMENT
62 y/o male with PMHx of HTN, NICM (EF 27%), T2DM on metformin who presented with acute on chronic ADHF. worsening since Thanksgiving 2023, and Pt endorses 2 falls due to lower extremity weakness but denies syncope. Per records patient noncompliant, hospitalization for HF in 2023, NSVT ( telemetry : longest run 39 beats). Of note, TTE: LVEF 27% RV dysfunction, LHC: coronaries with minor luminal irregularities, EKG from 3/2023 without RBBB/LBBB; normal QRS duration. Patient on aggressive diureses. EP consulted for ICD therapy and deemed pt. is not a candidate for ICD given his noncompliance and not on GDMT x 3 months. However tele reveals patient to be having episodes of NSVT, 3-20 beat duration, asymptomatic, and EP is asked to re-evaluate for secondary prevention.     #HFrEF/NICM  #NSVT  - Continue to monitor on tele  - Replete K to 4 and Mg to 2  - Continue metoprolol succinate  mg QD  - Continue GDMT for HF per cardiology recommendations  - Plan to ICD implant  - Keep NPO

## 2024-03-18 NOTE — PROGRESS NOTE ADULT - SUBJECTIVE AND OBJECTIVE BOX
Interval History:  No acute events overnight  Telemetry: NSR with intermittent NSVT     MEDICATIONS  (STANDING):  aspirin enteric coated 81 milliGRAM(s) Oral daily  buMETAnide 2 milliGRAM(s) Oral two times a day  dextrose 5%. 1000 milliLiter(s) (50 mL/Hr) IV Continuous <Continuous>  dextrose 5%. 1000 milliLiter(s) (100 mL/Hr) IV Continuous <Continuous>  dextrose 50% Injectable 25 Gram(s) IV Push once  dextrose 50% Injectable 12.5 Gram(s) IV Push once  dextrose 50% Injectable 25 Gram(s) IV Push once  glucagon  Injectable 1 milliGRAM(s) IntraMuscular once  influenza   Vaccine 0.5 milliLiter(s) IntraMuscular once  insulin lispro (ADMELOG) corrective regimen sliding scale   SubCutaneous three times a day before meals  insulin lispro (ADMELOG) corrective regimen sliding scale   SubCutaneous at bedtime  losartan 25 milliGRAM(s) Oral daily  metoprolol succinate  milliGRAM(s) Oral daily  spironolactone 50 milliGRAM(s) Oral daily    MEDICATIONS  (PRN):  acetaminophen     Tablet .. 650 milliGRAM(s) Oral every 6 hours PRN Temp greater or equal to 38C (100.4F), Mild Pain (1 - 3)  aluminum hydroxide/magnesium hydroxide/simethicone Suspension 30 milliLiter(s) Oral every 4 hours PRN Dyspepsia  dextrose Oral Gel 15 Gram(s) Oral once PRN Blood Glucose LESS THAN 70 milliGRAM(s)/deciliter  melatonin 3 milliGRAM(s) Oral at bedtime PRN Insomnia  ondansetron Injectable 4 milliGRAM(s) IV Push every 8 hours PRN Nausea and/or Vomiting    Vital Signs Last 24 Hrs  T(C): 36.9 (03-18-24 @ 05:55), Max: 36.9 (03-17-24 @ 20:07)  T(F): 98.4 (03-18-24 @ 05:55), Max: 98.4 (03-17-24 @ 20:07)  HR: 71 (03-18-24 @ 05:55) (67 - 84)  BP: 122/75 (03-18-24 @ 05:55) (95/65 - 122/75)  BP(mean): --  RR: 18 (03-18-24 @ 05:55) (16 - 18)  SpO2: 100% (03-18-24 @ 05:55) (100% - 100%)    Appearance: Normal	  HEENT:   Normal oral mucosa, PERRL, EOMI	  Lymphatic: No lymphadenopathy  Cardiovascular: Normal S1 S2, No JVD, No murmurs, No edema  Respiratory: Lungs clear to auscultation	  Psychiatry: A & O x 3, Mood & affect appropriate  Gastrointestinal:  Soft, Non-tender, + BS	  Skin: No rashes, No ecchymoses, No cyanosis	  Neurologic: Non-focal  Extremities: Normal range of motion, No clubbing, cyanosis or edema  Vascular: Peripheral pulses palpable 2+ bilaterally    LABS:	 	    CBC Full  -  ( 18 Mar 2024 04:12 )  WBC Count : 4.84 K/uL  Hemoglobin : 12.4 g/dL  Hematocrit : 39.1 %  Platelet Count - Automated : 252 K/uL  Mean Cell Volume : 89.7 fL  Mean Cell Hemoglobin : 28.4 pg  Mean Cell Hemoglobin Concentration : 31.7 gm/dL  Auto Neutrophil # : 2.36 K/uL  Auto Lymphocyte # : 1.40 K/uL  Auto Monocyte # : 0.68 K/uL  Auto Eosinophil # : 0.16 K/uL  Auto Basophil # : 0.00 K/uL  Auto Neutrophil % : 48.8 %  Auto Lymphocyte % : 28.9 %  Auto Monocyte % : 14.0 %  Auto Eosinophil % : 3.3 %  Auto Basophil % : 0.0 %    03-18    135  |  87<L>  |  59<H>  ----------------------------<  139<H>  3.5   |  40<H>  |  1.44<H>  03-17    137  |  86<L>  |  54<H>  ----------------------------<  165<H>  3.4<L>   |  38<H>  |  1.54<H>    Ca    8.9      18 Mar 2024 04:12  Ca    8.9      17 Mar 2024 05:19  Phos  3.7     03-18  Phos  3.4     03-17  Mg     2.10     03-18  Mg     2.20     03-17      PT/INR - ( 17 Mar 2024 13:23 )   PT: 12.7 sec;   INR: 1.13 ratio         PTT - ( 17 Mar 2024 13:23 )  PTT:32.4 sec

## 2024-03-18 NOTE — PROGRESS NOTE ADULT - ASSESSMENT
63y Male with history of CHF presents with SOB. Nephrology consulted for elevated Scr.    1. CKD-3a: Giovanni Scr 1-1.2 however likely dilutional in setting of volume overload. Suspect current values represent true baseline. Check UA, urine urea and urine creatinine. Check bladder scan. Avoid nephrotoxins. Monitor electrolytes.    2. Hypokalemia: Resolved with potassium also repleted this morning. Continue with aldactone. Will increase to 50 mg twice daily if patient remains persistently hypokalemic. Monitor serum potassium.    3. Metabolic alkalosis: Check blood gas in AM. Will consider diamox pending results. Monitor pH.    4. Acute on chronic systolic HF: Continue with bumex 2 mg twice daily. TTE with severely decreased LVSF. Monitor .      [facility] NEPHROLOGY  Adi Diaz M.D.  Nj Abarca D.O.  Kezia Dunn M.D.  MD Ute Humphrey, MSN, ANP-C    Telephone: [phone]  Facsimile: [phone] [place] [address], [address]  [place] [address]   63y Male with history of CHF presents with SOB. Nephrology consulted for elevated Scr.    1. CKD-3a: Giovanni Scr 1-1.2 however likely dilutional in setting of volume overload. Suspect current values represent true baseline. Check UA, urine urea and urine creatinine. Check bladder scan. Avoid nephrotoxins. Monitor electrolytes.    2. Hypokalemia: Resolved with potassium also repleted this morning. Continue with aldactone. Will increase to 50 mg twice daily if patient remains persistently hypokalemic. Monitor serum potassium.    3. Metabolic alkalosis: Check blood gas in AM. Will consider diamox pending results. Monitor pH.    4. HTN with CKD: BP low normal. Reasonable to continue with low dose ARB. Monitor BP.    5. Acute on chronic systolic HF: Continue with bumex 2 mg twice daily. TTE with severely decreased LVSF. Monitor .      [facility] NEPHROLOGY  Adi Diaz M.D.  Nj Abarca D.O.  Kezia Dunn M.D.  MD Ute Humphrey, MSN, ANP-C    Telephone: [phone]  Facsimile: [phone] [place] [address], [address]  [place] [address]

## 2024-03-18 NOTE — PROGRESS NOTE ADULT - ASSESSMENT
ECHO 3/4/23: EF 33% mild MR, Severe global left ventricular systolic dysfunction  cardiac cath from 3/6/23  Coronaries with minor luminal irregularities. Elevated LVEDP.      A/P  63 yr old male with a pmh of HTN, HF rEF ( EF33%), T2DM on metformin who presents with ROSS and bilateral lower extremity edema with his legs feeling heavy.     #Acute on chronic HFrEF  -vol status improved  -continue bumex 2 mg PO BID  -will dec to qd oif creat cont to rise  -renal fxn stable   -hold metolazone  -supplement k  -continue aldactone 50mg qd  -Not compliant with home meds (lasix 40mg daily, metoprolol XL 50mg daily, hydralazine 25mg TID)  -repeat echo this admission with ef 27%   Left ventricular systolic function is severely decreased  -Prior cardiac cath from 3/6/23  Coronaries with minor luminal irregularities. Elevated LVEDP.   -strict I/O, monitor daily weights, fluid restriction   -Continue toprol 300mg PO daily-- events on tele noted   -eps eval noted for NSVT  -await ICD rod, patient agreeable   -per ACP insurance does not cover entresto   -c/w losartan   -off hydral to allow bp room for aldactone    #syncope, loc  -depressed ed, ivcd, nsvt on tele, up to 31 beats  -eps eval noted   -ct head unremarkable   -await icd     #Hypertensive urgency.   -bp improved   -continue meds     #T2DM (type 2 diabetes mellitus).   -med f/u     #HLD  -cont statin    dvt ppx    45 minutes spent on total encounter; more than 50% of the visit was spent counseling and/or coordinating care by the attending physician.

## 2024-03-18 NOTE — PROGRESS NOTE ADULT - SUBJECTIVE AND OBJECTIVE BOX
Patient is a 63y old  Male who presents with a chief complaint of Acute decompensated heart failure (18 Mar 2024 11:36)      INTERVAL HPI/OVERNIGHT EVENTS: seen and examined, NAD , scheduled for ICD implant  T(C): 36.9 (03-18-24 @ 05:55), Max: 36.9 (03-17-24 @ 20:07)  HR: 71 (03-18-24 @ 05:55) (67 - 84)  BP: 122/75 (03-18-24 @ 05:55) (95/65 - 122/75)  RR: 18 (03-18-24 @ 05:55) (16 - 18)  SpO2: 100% (03-18-24 @ 05:55) (100% - 100%)  Wt(kg): --  I&O's Summary    17 Mar 2024 07:01  -  18 Mar 2024 07:00  --------------------------------------------------------  IN: 720 mL / OUT: 2300 mL / NET: -1580 mL    18 Mar 2024 07:01  -  18 Mar 2024 13:25  --------------------------------------------------------  IN: 0 mL / OUT: 750 mL / NET: -750 mL        PAST MEDICAL & SURGICAL HISTORY:  HTN (hypertension)      HLD (hyperlipidemia)      DM (diabetes mellitus)      HFrEF (heart failure with reduced ejection fraction)      No significant past surgical history          SOCIAL HISTORY  Alcohol:  Tobacco:  Illicit substance use:    FAMILY HISTORY:    REVIEW OF SYSTEMS:  CONSTITUTIONAL: No fever, weight loss, or fatigue  EYES: No eye pain, visual disturbances, or discharge  ENMT:  No difficulty hearing, tinnitus, vertigo; No sinus or throat pain  NECK: No pain or stiffness  RESPIRATORY: No cough, wheezing, chills or hemoptysis; No shortness of breath  CARDIOVASCULAR: No chest pain, palpitations, dizziness, or leg swelling  GASTROINTESTINAL: No abdominal or epigastric pain. No nausea, vomiting, or hematemesis; No diarrhea or constipation. No melena or hematochezia.  GENITOURINARY: No dysuria, frequency, hematuria, or incontinence  NEUROLOGICAL: No headaches, memory loss, loss of strength, numbness, or tremors  SKIN: No itching, burning, rashes, or lesions   LYMPH NODES: No enlarged glands  ENDOCRINE: No heat or cold intolerance; No hair loss  MUSCULOSKELETAL: No joint pain or swelling; No muscle, back, or extremity pain  PSYCHIATRIC: No depression, anxiety, mood swings, or difficulty sleeping  HEME/LYMPH: No easy bruising, or bleeding gums  ALLERY AND IMMUNOLOGIC: No hives or eczema    RADIOLOGY & ADDITIONAL TESTS:    Imaging Personally Reviewed:  [ ] YES  [ ] NO    Consultant(s) Notes Reviewed:  [ ] YES  [ ] NO    PHYSICAL EXAM:  GENERAL: NAD, well-groomed, well-developed  HEAD:  Atraumatic, Normocephalic  EYES: EOMI, PERRLA, conjunctiva and sclera clear  ENMT: No tonsillar erythema, exudates, or enlargement; Moist mucous membranes, Good dentition, No lesions  NECK: Supple, No JVD, Normal thyroid  NERVOUS SYSTEM:  Alert & Oriented X3, Good concentration; Motor Strength 5/5 B/L upper and lower extremities; DTRs 2+ intact and symmetric  CHEST/LUNG: Clear to percussion bilaterally; No rales, rhonchi, wheezing, or rubs  HEART: Regular rate and rhythm; No murmurs, rubs, or gallops  ABDOMEN: Soft, Nontender, Nondistended; Bowel sounds present  EXTREMITIES:  2+ Peripheral Pulses, No clubbing, cyanosis, or edema  LYMPH: No lymphadenopathy noted  SKIN: No rashes or lesions    LABS:                        12.4   4.84  )-----------( 252      ( 18 Mar 2024 04:12 )             39.1     03-18    135  |  87<L>  |  59<H>  ----------------------------<  139<H>  3.5   |  40<H>  |  1.44<H>    Ca    8.9      18 Mar 2024 04:12  Phos  3.7     03-18  Mg     2.10     03-18      PT/INR - ( 17 Mar 2024 13:23 )   PT: 12.7 sec;   INR: 1.13 ratio         PTT - ( 17 Mar 2024 13:23 )  PTT:32.4 sec  Urinalysis Basic - ( 18 Mar 2024 04:12 )    Color: x / Appearance: x / SG: x / pH: x  Gluc: 139 mg/dL / Ketone: x  / Bili: x / Urobili: x   Blood: x / Protein: x / Nitrite: x   Leuk Esterase: x / RBC: x / WBC x   Sq Epi: x / Non Sq Epi: x / Bacteria: x      CAPILLARY BLOOD GLUCOSE      POCT Blood Glucose.: 129 mg/dL (18 Mar 2024 12:36)  POCT Blood Glucose.: 120 mg/dL (18 Mar 2024 08:29)  POCT Blood Glucose.: 149 mg/dL (17 Mar 2024 21:58)  POCT Blood Glucose.: 181 mg/dL (17 Mar 2024 17:13)        Urinalysis Basic - ( 18 Mar 2024 04:12 )    Color: x / Appearance: x / SG: x / pH: x  Gluc: 139 mg/dL / Ketone: x  / Bili: x / Urobili: x   Blood: x / Protein: x / Nitrite: x   Leuk Esterase: x / RBC: x / WBC x   Sq Epi: x / Non Sq Epi: x / Bacteria: x        MEDICATIONS  (STANDING):  aspirin enteric coated 81 milliGRAM(s) Oral daily  buMETAnide 2 milliGRAM(s) Oral two times a day  dextrose 5%. 1000 milliLiter(s) (100 mL/Hr) IV Continuous <Continuous>  dextrose 5%. 1000 milliLiter(s) (50 mL/Hr) IV Continuous <Continuous>  dextrose 50% Injectable 25 Gram(s) IV Push once  dextrose 50% Injectable 25 Gram(s) IV Push once  dextrose 50% Injectable 12.5 Gram(s) IV Push once  glucagon  Injectable 1 milliGRAM(s) IntraMuscular once  influenza   Vaccine 0.5 milliLiter(s) IntraMuscular once  insulin lispro (ADMELOG) corrective regimen sliding scale   SubCutaneous at bedtime  insulin lispro (ADMELOG) corrective regimen sliding scale   SubCutaneous three times a day before meals  losartan 25 milliGRAM(s) Oral daily  metoprolol succinate  milliGRAM(s) Oral daily  spironolactone 50 milliGRAM(s) Oral daily    MEDICATIONS  (PRN):  acetaminophen     Tablet .. 650 milliGRAM(s) Oral every 6 hours PRN Temp greater or equal to 38C (100.4F), Mild Pain (1 - 3)  aluminum hydroxide/magnesium hydroxide/simethicone Suspension 30 milliLiter(s) Oral every 4 hours PRN Dyspepsia  dextrose Oral Gel 15 Gram(s) Oral once PRN Blood Glucose LESS THAN 70 milliGRAM(s)/deciliter  melatonin 3 milliGRAM(s) Oral at bedtime PRN Insomnia  ondansetron Injectable 4 milliGRAM(s) IV Push every 8 hours PRN Nausea and/or Vomiting      Care Discussed with Consultants/Other Providers [ ] YES  [ ] NO

## 2024-03-18 NOTE — PROGRESS NOTE ADULT - SUBJECTIVE AND OBJECTIVE BOX
[facility] NEPHROLOGY- PROGRESS NOTE    63y Male with history of CHF presents with SOB. Nephrology consulted for elevated Scr.    REVIEW OF SYSTEMS:  Gen: no fevers  Cards: no chest pain  Resp: no dyspnea  GI: no nausea or vomiting or diarrhea  Vascular: + LE edema improving    No Known Allergies      Hospital Medications: Medications reviewed    VITALS:  T(F): 98.4 (03-18-24 @ 05:55), Max: 98.4 (03-17-24 @ 20:07)  HR: 71 (03-18-24 @ 05:55)  BP: 122/75 (03-18-24 @ 05:55)  RR: 18 (03-18-24 @ 05:55)  SpO2: 100% (03-18-24 @ 05:55)  Wt(kg): --  Height (cm): 175.3 (03-02 @ 22:40)  Weight (kg): 112.4 (03-02 @ 22:40)  BMI (kg/m2): 36.6 (03-02 @ 22:40)  BSA (m2): 2.26 (03-02 @ 22:40)    03-17 @ 07:01  -  03-18 @ 07:00  --------------------------------------------------------  IN: 720 mL / OUT: 2300 mL / NET: -1580 mL    03-18 @ 07:01  -  03-18 @ 13:38  --------------------------------------------------------  IN: 0 mL / OUT: 750 mL / NET: -750 mL        PHYSICAL EXAM:    Gen: NAD, calm  Cards: RRR, +S1/S2, no M/G/R  Resp: CTA B/L  GI: soft, NT/ND, NABS  Vascular: 2+ LE edema B/L    LABS:  03-18    135  |  87<L>  |  59<H>  ----------------------------<  139<H>  3.5   |  40<H>  |  1.44<H>    Ca    8.9      18 Mar 2024 04:12  Phos  3.7     03-18  Mg     2.10     03-18      Creatinine Trend: 1.44 <--, 1.54 <--, 1.42 <--, 1.41 <--, 1.40 <--, 1.46 <--, 1.26 <--                        12.4   4.84  )-----------( 252      ( 18 Mar 2024 04:12 )             39.1     Urine Studies:  Urinalysis Basic - ( 18 Mar 2024 04:12 )    Color:  / Appearance:  / SG:  / pH:   Gluc: 139 mg/dL / Ketone:   / Bili:  / Urobili:    Blood:  / Protein:  / Nitrite:    Leuk Esterase:  / RBC:  / WBC    Sq Epi:  / Non Sq Epi:  / Bacteria:           RADIOLOGY & ADDITIONAL STUDIES:

## 2024-03-19 PROBLEM — I50.20 UNSPECIFIED SYSTOLIC (CONGESTIVE) HEART FAILURE: Chronic | Status: ACTIVE | Noted: 2024-03-02

## 2024-03-19 LAB
ANION GAP SERPL CALC-SCNC: 10 MMOL/L — SIGNIFICANT CHANGE UP (ref 7–14)
APPEARANCE UR: CLEAR — SIGNIFICANT CHANGE UP
BACTERIA # UR AUTO: ABNORMAL /HPF
BASE EXCESS BLDV CALC-SCNC: 16.1 MMOL/L — HIGH (ref -2–3)
BILIRUB UR-MCNC: NEGATIVE — SIGNIFICANT CHANGE UP
BUN SERPL-MCNC: 53 MG/DL — HIGH (ref 7–23)
CALCIUM SERPL-MCNC: 9.3 MG/DL — SIGNIFICANT CHANGE UP (ref 8.4–10.5)
CAST: 0 /LPF — SIGNIFICANT CHANGE UP (ref 0–4)
CHLORIDE SERPL-SCNC: 91 MMOL/L — LOW (ref 98–107)
CO2 BLDV-SCNC: 40.5 MMOL/L — HIGH (ref 22–26)
CO2 SERPL-SCNC: 37 MMOL/L — HIGH (ref 22–31)
COLOR SPEC: YELLOW — SIGNIFICANT CHANGE UP
CREAT ?TM UR-MCNC: 27 MG/DL — SIGNIFICANT CHANGE UP
CREAT SERPL-MCNC: 1.36 MG/DL — HIGH (ref 0.5–1.3)
DIFF PNL FLD: NEGATIVE — SIGNIFICANT CHANGE UP
EGFR: 58 ML/MIN/1.73M2 — LOW
GLUCOSE BLDC GLUCOMTR-MCNC: 105 MG/DL — HIGH (ref 70–99)
GLUCOSE BLDC GLUCOMTR-MCNC: 109 MG/DL — HIGH (ref 70–99)
GLUCOSE BLDC GLUCOMTR-MCNC: 114 MG/DL — HIGH (ref 70–99)
GLUCOSE BLDC GLUCOMTR-MCNC: 128 MG/DL — HIGH (ref 70–99)
GLUCOSE BLDC GLUCOMTR-MCNC: 195 MG/DL — HIGH (ref 70–99)
GLUCOSE SERPL-MCNC: 108 MG/DL — HIGH (ref 70–99)
GLUCOSE UR QL: NEGATIVE MG/DL — SIGNIFICANT CHANGE UP
HCO3 BLDV-SCNC: 39 MMOL/L — HIGH (ref 22–29)
HCT VFR BLD CALC: 37.6 % — LOW (ref 39–50)
HGB BLD-MCNC: 12.4 G/DL — LOW (ref 13–17)
KETONES UR-MCNC: NEGATIVE MG/DL — SIGNIFICANT CHANGE UP
LEUKOCYTE ESTERASE UR-ACNC: NEGATIVE — SIGNIFICANT CHANGE UP
MAGNESIUM SERPL-MCNC: 2.1 MG/DL — SIGNIFICANT CHANGE UP (ref 1.6–2.6)
MCHC RBC-ENTMCNC: 29 PG — SIGNIFICANT CHANGE UP (ref 27–34)
MCHC RBC-ENTMCNC: 33 GM/DL — SIGNIFICANT CHANGE UP (ref 32–36)
MCV RBC AUTO: 88.1 FL — SIGNIFICANT CHANGE UP (ref 80–100)
NITRITE UR-MCNC: NEGATIVE — SIGNIFICANT CHANGE UP
NRBC # BLD: 0 /100 WBCS — SIGNIFICANT CHANGE UP (ref 0–0)
NRBC # FLD: 0 K/UL — SIGNIFICANT CHANGE UP (ref 0–0)
PCO2 BLDV: 40 MMHG — LOW (ref 42–55)
PH BLDV: 7.6 — HIGH (ref 7.32–7.43)
PH UR: 8 — SIGNIFICANT CHANGE UP (ref 5–8)
PHOSPHATE SERPL-MCNC: 3.7 MG/DL — SIGNIFICANT CHANGE UP (ref 2.5–4.5)
PLATELET # BLD AUTO: 259 K/UL — SIGNIFICANT CHANGE UP (ref 150–400)
PO2 BLDV: 68 MMHG — HIGH (ref 25–45)
POTASSIUM SERPL-MCNC: 3.7 MMOL/L — SIGNIFICANT CHANGE UP (ref 3.5–5.3)
POTASSIUM SERPL-SCNC: 3.7 MMOL/L — SIGNIFICANT CHANGE UP (ref 3.5–5.3)
PROT ?TM UR-MCNC: 7 MG/DL — SIGNIFICANT CHANGE UP
PROT UR-MCNC: NEGATIVE MG/DL — SIGNIFICANT CHANGE UP
PROT/CREAT UR-RTO: 0.3 RATIO — HIGH (ref 0–0.2)
RBC # BLD: 4.27 M/UL — SIGNIFICANT CHANGE UP (ref 4.2–5.8)
RBC # FLD: 13.9 % — SIGNIFICANT CHANGE UP (ref 10.3–14.5)
RBC CASTS # UR COMP ASSIST: 1 /HPF — SIGNIFICANT CHANGE UP (ref 0–4)
SAO2 % BLDV: 94.8 % — HIGH (ref 67–88)
SODIUM SERPL-SCNC: 138 MMOL/L — SIGNIFICANT CHANGE UP (ref 135–145)
SP GR SPEC: 1.01 — SIGNIFICANT CHANGE UP (ref 1–1.03)
SQUAMOUS # UR AUTO: 0 /HPF — SIGNIFICANT CHANGE UP (ref 0–5)
UROBILINOGEN FLD QL: 1 MG/DL — SIGNIFICANT CHANGE UP (ref 0.2–1)
UUN UR-MCNC: 395.6 MG/DL — SIGNIFICANT CHANGE UP
WBC # BLD: 4.85 K/UL — SIGNIFICANT CHANGE UP (ref 3.8–10.5)
WBC # FLD AUTO: 4.85 K/UL — SIGNIFICANT CHANGE UP (ref 3.8–10.5)
WBC UR QL: 0 /HPF — SIGNIFICANT CHANGE UP (ref 0–5)

## 2024-03-19 PROCEDURE — 33249 INSJ/RPLCMT DEFIB W/LEAD(S): CPT | Mod: 59

## 2024-03-19 PROCEDURE — 99232 SBSQ HOSP IP/OBS MODERATE 35: CPT

## 2024-03-19 PROCEDURE — 71045 X-RAY EXAM CHEST 1 VIEW: CPT | Mod: 26

## 2024-03-19 PROCEDURE — 93010 ELECTROCARDIOGRAM REPORT: CPT

## 2024-03-19 RX ORDER — BUMETANIDE 0.25 MG/ML
2 INJECTION INTRAMUSCULAR; INTRAVENOUS
Refills: 0 | Status: DISCONTINUED | OUTPATIENT
Start: 2024-03-20 | End: 2024-03-20

## 2024-03-19 RX ORDER — POTASSIUM CHLORIDE 20 MEQ
40 PACKET (EA) ORAL ONCE
Refills: 0 | Status: DISCONTINUED | OUTPATIENT
Start: 2024-03-19 | End: 2024-03-20

## 2024-03-19 RX ORDER — CEFAZOLIN SODIUM 1 G
1000 VIAL (EA) INJECTION EVERY 8 HOURS
Refills: 0 | Status: COMPLETED | OUTPATIENT
Start: 2024-03-19 | End: 2024-03-20

## 2024-03-19 RX ORDER — ACETAZOLAMIDE 250 MG/1
500 TABLET ORAL ONCE
Refills: 0 | Status: COMPLETED | OUTPATIENT
Start: 2024-03-19 | End: 2024-03-19

## 2024-03-19 RX ORDER — ACETAZOLAMIDE 250 MG/1
500 TABLET ORAL ONCE
Refills: 0 | Status: DISCONTINUED | OUTPATIENT
Start: 2024-03-19 | End: 2024-03-19

## 2024-03-19 RX ADMIN — ACETAZOLAMIDE 500 MILLIGRAM(S): 250 TABLET ORAL at 22:23

## 2024-03-19 RX ADMIN — SPIRONOLACTONE 50 MILLIGRAM(S): 25 TABLET, FILM COATED ORAL at 04:59

## 2024-03-19 RX ADMIN — Medication 300 MILLIGRAM(S): at 04:59

## 2024-03-19 RX ADMIN — LOSARTAN POTASSIUM 25 MILLIGRAM(S): 100 TABLET, FILM COATED ORAL at 04:58

## 2024-03-19 RX ADMIN — Medication 81 MILLIGRAM(S): at 18:51

## 2024-03-19 RX ADMIN — BUMETANIDE 2 MILLIGRAM(S): 0.25 INJECTION INTRAMUSCULAR; INTRAVENOUS at 04:59

## 2024-03-19 RX ADMIN — Medication 100 MILLIGRAM(S): at 22:28

## 2024-03-19 NOTE — CHART NOTE - NSCHARTNOTEFT_GEN_A_CORE
ACP NIGHT COVERAGE    Pt went for AICD placement with anterior chest wall accessed. Pt denies SOB, CP, swelling, edema, paresthesia distal to site or pain at site. Site checked. No ecchymosis, hematoma, or swelling within access point. Active and passive ROM of R shoulder, R elbow, R wrist, and fingers without deficits. Motor and Sensory intact. 2+ peripheral pulses intact. Capillary refill less than 2 seconds. Pt educated to look out for bruising, swelling, tingling, and numbness of site/distal to site and notify RN. Will continue to monitor overnight.

## 2024-03-19 NOTE — PROGRESS NOTE ADULT - ASSESSMENT
63y Male with history of CHF presents with SOB. Nephrology consulted for elevated Scr.    1. CKD-3a: Giovanni Scr 1-1.2 however likely dilutional in setting of volume overload. Suspect current values represent true baseline. UA bland with mild proteinuria (spot urine TP/CR 0.3). Bladder scan negative. Avoid nephrotoxins. Monitor electrolytes.    2. Hypokalemia: Resolved with potassium also repleted this morning. Continue with aldactone. Will increase to 50 mg twice daily if patient remains persistently hypokalemic. Monitor serum potassium.    3. Metabolic alkalosis: Will give diamox 500 mg IV X 1 dose this evening. Repeat blood gas in AM. Monitor pH.    4. HTN with CKD: BP controlled. Continue with current medications. Monitor BP.    5. Acute on chronic systolic HF: Improving. Will give diamox this evening in place of bumex (can resume 2 mg PO twice daily in AM). TTE with severely decreased LVSF. Monitor .      [facility] NEPHROLOGY  Adi Diaz M.D.  Nj Abarca D.O.  Kezia Dunn M.D.  MD Ute Humphrey, MSN, ANP-C    Telephone: [phone]  Facsimile: [phone] [place] [address], [address]  [place] [address]

## 2024-03-19 NOTE — PROGRESS NOTE ADULT - ASSESSMENT
62 y/o male with PMHx of HTN, NICM (EF 27%), T2DM on metformin who presented with acute on chronic ADHF. worsening since Thanksgiving 2023, and Pt endorses 2 falls due to lower extremity weakness but denies syncope. Per records patient noncompliant, hospitalization for HF in 2023, NSVT ( telemetry : longest run 39 beats). Of note, TTE: LVEF 27% RV dysfunction, LHC: coronaries with minor luminal irregularities, EKG from 3/2023 without RBBB/LBBB; normal QRS duration. Patient on aggressive diureses. EP consulted for ICD therapy and deemed pt. is not a candidate for ICD given his noncompliance and not on GDMT x 3 months.     #HFrEF/NICM  #NSVT  - Continue to monitor on tele  - Replete K to 4 and Mg to 2  - Continue metoprolol succinate  mg QD  - Continue GDMT for HF per cardiology recommendations  - Plan to ICD implant  - Keep NPO

## 2024-03-19 NOTE — PROGRESS NOTE ADULT - SUBJECTIVE AND OBJECTIVE BOX
Interval History:  No acute events overnight  Telemetry: NSR/SB 50-60's    MEDICATIONS  (STANDING):  aspirin enteric coated 81 milliGRAM(s) Oral daily  buMETAnide 2 milliGRAM(s) Oral two times a day  dextrose 5%. 1000 milliLiter(s) (50 mL/Hr) IV Continuous <Continuous>  dextrose 5%. 1000 milliLiter(s) (100 mL/Hr) IV Continuous <Continuous>  dextrose 50% Injectable 25 Gram(s) IV Push once  dextrose 50% Injectable 12.5 Gram(s) IV Push once  dextrose 50% Injectable 25 Gram(s) IV Push once  glucagon  Injectable 1 milliGRAM(s) IntraMuscular once  influenza   Vaccine 0.5 milliLiter(s) IntraMuscular once  insulin lispro (ADMELOG) corrective regimen sliding scale   SubCutaneous three times a day before meals  insulin lispro (ADMELOG) corrective regimen sliding scale   SubCutaneous at bedtime  losartan 25 milliGRAM(s) Oral daily  metoprolol succinate  milliGRAM(s) Oral daily  spironolactone 50 milliGRAM(s) Oral daily    MEDICATIONS  (PRN):  acetaminophen     Tablet .. 650 milliGRAM(s) Oral every 6 hours PRN Temp greater or equal to 38C (100.4F), Mild Pain (1 - 3)  aluminum hydroxide/magnesium hydroxide/simethicone Suspension 30 milliLiter(s) Oral every 4 hours PRN Dyspepsia  dextrose Oral Gel 15 Gram(s) Oral once PRN Blood Glucose LESS THAN 70 milliGRAM(s)/deciliter  melatonin 3 milliGRAM(s) Oral at bedtime PRN Insomnia  ondansetron Injectable 4 milliGRAM(s) IV Push every 8 hours PRN Nausea and/or Vomiting    Appearance: Normal	  HEENT:   Normal oral mucosa, PERRL, EOMI	  Lymphatic: No lymphadenopathy  Cardiovascular: Normal S1 S2, No JVD, No murmurs, No edema  Respiratory: Lungs clear to auscultation	  Psychiatry: A & O x 3, Mood & affect appropriate  Gastrointestinal:  Soft, Non-tender, + BS	  Skin: No rashes, No ecchymoses, No cyanosis	  Neurologic: Non-focal  Extremities: Normal range of motion, No clubbing, cyanosis or edema  Vascular: Peripheral pulses palpable 2+ bilaterally    LABS:	 	    CBC Full  -  ( 19 Mar 2024 06:55 )  WBC Count : 4.85 K/uL  Hemoglobin : 12.4 g/dL  Hematocrit : 37.6 %  Platelet Count - Automated : 259 K/uL  Mean Cell Volume : 88.1 fL  Mean Cell Hemoglobin : 29.0 pg  Mean Cell Hemoglobin Concentration : 33.0 gm/dL  Auto Neutrophil # : x  Auto Lymphocyte # : x  Auto Monocyte # : x  Auto Eosinophil # : x  Auto Basophil # : x  Auto Neutrophil % : x  Auto Lymphocyte % : x  Auto Monocyte % : x  Auto Eosinophil % : x  Auto Basophil % : x    03-19    138  |  91<L>  |  53<H>  ----------------------------<  108<H>  3.7   |  37<H>  |  1.36<H>  03-18    135  |  87<L>  |  59<H>  ----------------------------<  139<H>  3.5   |  40<H>  |  1.44<H>    Ca    9.3      19 Mar 2024 06:55  Ca    8.9      18 Mar 2024 04:12  Phos  3.7     03-19  Phos  3.7     03-18  Mg     2.10     03-19  Mg     2.10     03-18      PT/INR - ( 17 Mar 2024 13:23 )   PT: 12.7 sec;   INR: 1.13 ratio         PTT - ( 17 Mar 2024 13:23 )  PTT:32.4 sec   Interval History:  No acute events overnight  Telemetry: NSR 70s    MEDICATIONS  (STANDING):  aspirin enteric coated 81 milliGRAM(s) Oral daily  buMETAnide 2 milliGRAM(s) Oral two times a day  dextrose 5%. 1000 milliLiter(s) (50 mL/Hr) IV Continuous <Continuous>  dextrose 5%. 1000 milliLiter(s) (100 mL/Hr) IV Continuous <Continuous>  dextrose 50% Injectable 25 Gram(s) IV Push once  dextrose 50% Injectable 12.5 Gram(s) IV Push once  dextrose 50% Injectable 25 Gram(s) IV Push once  glucagon  Injectable 1 milliGRAM(s) IntraMuscular once  influenza   Vaccine 0.5 milliLiter(s) IntraMuscular once  insulin lispro (ADMELOG) corrective regimen sliding scale   SubCutaneous three times a day before meals  insulin lispro (ADMELOG) corrective regimen sliding scale   SubCutaneous at bedtime  losartan 25 milliGRAM(s) Oral daily  metoprolol succinate  milliGRAM(s) Oral daily  spironolactone 50 milliGRAM(s) Oral daily    MEDICATIONS  (PRN):  acetaminophen     Tablet .. 650 milliGRAM(s) Oral every 6 hours PRN Temp greater or equal to 38C (100.4F), Mild Pain (1 - 3)  aluminum hydroxide/magnesium hydroxide/simethicone Suspension 30 milliLiter(s) Oral every 4 hours PRN Dyspepsia  dextrose Oral Gel 15 Gram(s) Oral once PRN Blood Glucose LESS THAN 70 milliGRAM(s)/deciliter  melatonin 3 milliGRAM(s) Oral at bedtime PRN Insomnia  ondansetron Injectable 4 milliGRAM(s) IV Push every 8 hours PRN Nausea and/or Vomiting    Appearance: Normal	  HEENT:   Normal oral mucosa, PERRL, EOMI	  Lymphatic: No lymphadenopathy  Cardiovascular: Normal S1 S2, No JVD, No murmurs, No edema  Respiratory: Lungs clear to auscultation	  Psychiatry: A & O x 3, Mood & affect appropriate  Gastrointestinal:  Soft, Non-tender, + BS	  Skin: No rashes, No ecchymoses, No cyanosis	  Neurologic: Non-focal  Extremities: Normal range of motion, No clubbing, cyanosis or edema  Vascular: Peripheral pulses palpable 2+ bilaterally    LABS:	 	    CBC Full  -  ( 19 Mar 2024 06:55 )  WBC Count : 4.85 K/uL  Hemoglobin : 12.4 g/dL  Hematocrit : 37.6 %  Platelet Count - Automated : 259 K/uL  Mean Cell Volume : 88.1 fL  Mean Cell Hemoglobin : 29.0 pg  Mean Cell Hemoglobin Concentration : 33.0 gm/dL  Auto Neutrophil # : x  Auto Lymphocyte # : x  Auto Monocyte # : x  Auto Eosinophil # : x  Auto Basophil # : x  Auto Neutrophil % : x  Auto Lymphocyte % : x  Auto Monocyte % : x  Auto Eosinophil % : x  Auto Basophil % : x    03-19    138  |  91<L>  |  53<H>  ----------------------------<  108<H>  3.7   |  37<H>  |  1.36<H>  03-18    135  |  87<L>  |  59<H>  ----------------------------<  139<H>  3.5   |  40<H>  |  1.44<H>    Ca    9.3      19 Mar 2024 06:55  Ca    8.9      18 Mar 2024 04:12  Phos  3.7     03-19  Phos  3.7     03-18  Mg     2.10     03-19  Mg     2.10     03-18      PT/INR - ( 17 Mar 2024 13:23 )   PT: 12.7 sec;   INR: 1.13 ratio         PTT - ( 17 Mar 2024 13:23 )  PTT:32.4 sec

## 2024-03-19 NOTE — PROGRESS NOTE ADULT - ASSESSMENT
63 yr old male with a pmh of HTN, HF rEF ( EF33%), T2DM on metformin who presents with ROSS and bilateral lower extremity edema with his legs feeling heavy.     #Acute on chronic systolic heart Failure : due to non complaince with meds : d/w pt and niece need to adhere with meds and need for AICD after re-evaluation of compliance   cont diuresis   on bumex 2 mg now PO bid   metolazone 2.5 mg x 3 times/ week   losartan   c/w Toprol  mg daily   Cardio following   EF 27%  started on aldactone 50 mg daily         #syncope  #NSVT on tele   on BBlocker   seen by EP   still having frequent ectopy on tele monitor : recommend AICD during this admission : if patient agrees   c/w tele monitor      #Hypertensive   controlled now     #DM-2   a1c 7.1  c/w Insulin / FSBS    #HLD  -cont statin    # non compliance with meds :  counseling done     dispo: scheduled for ICD implant today

## 2024-03-19 NOTE — PROGRESS NOTE ADULT - ASSESSMENT
ECHO 3/4/23: EF 33% mild MR, Severe global left ventricular systolic dysfunction  cardiac cath from 3/6/23  Coronaries with minor luminal irregularities. Elevated LVEDP.      A/P  63 yr old male with a pmh of HTN, HF rEF ( EF33%), T2DM on metformin who presents with ROSS and bilateral lower extremity edema with his legs feeling heavy.     #Acute on chronic HFrEF  -vol status improved  -continue bumex 2 mg PO BID- creat / K improving   -hold metolazone  -supplement k  -continue aldactone 50mg qd  -Not compliant with home meds (lasix 40mg daily, metoprolol XL 50mg daily, hydralazine 25mg TID)  -repeat echo this admission with ef 27%   Left ventricular systolic function is severely decreased  -Prior cardiac cath from 3/6/23  Coronaries with minor luminal irregularities. Elevated LVEDP.   -strict I/O, monitor daily weights, fluid restriction   -Continue toprol 300mg PO daily-- prior events on tele noted   -eps eval noted for NSVT  -PENDING  ICD , patient agreeable   -per [facility] insurance does not cover entresto   -c/w losartan   -off hydral to allow bp room for aldactone    #syncope, loc  -depressed ed, ivcd, nsvt on tele, up to 31 beats  -eps eval noted   -ct head unremarkable   -await icd     #Hypertensive urgency.   -bp improved   -continue meds     #T2DM (type 2 diabetes mellitus).   -med f/u     #HLD  -cont statin    dvt ppx

## 2024-03-19 NOTE — PROGRESS NOTE ADULT - SUBJECTIVE AND OBJECTIVE BOX
CARDIOLOGY FOLLOW UP - Dr. Zavala  DATE OF SERVICE: 3/19/24     CC no acute events   seen earlier- pending ICD implant       REVIEW OF SYSTEMS:  CONSTITUTIONAL: No fever, weight loss, or fatigue  RESPIRATORY: No cough, wheezing, chills or hemoptysis; No Shortness of Breath  CARDIOVASCULAR: No chest pain, palpitations, passing out, dizziness, or leg swelling  GASTROINTESTINAL: No abdominal or epigastric pain. No nausea, vomiting, or hematemesis; No diarrhea or constipation. No melena or hematochezia.    PHYSICAL EXAM:  T(C): 36.5 (03-19-24 @ 04:50), Max: 37 (03-18-24 @ 19:59)  HR: 71 (03-19-24 @ 04:50) (71 - 79)  BP: 127/85 (03-19-24 @ 04:50) (111/66 - 127/85)  RR: 17 (03-19-24 @ 04:50) (17 - 17)  SpO2: 100% (03-19-24 @ 04:50) (99% - 100%)  Wt(kg): --  I&O's Summary    18 Mar 2024 07:01  -  19 Mar 2024 07:00  --------------------------------------------------------  IN: 0 mL / OUT: 1250 mL / NET: -1250 mL        Appearance: Normal	  Cardiovascular: Normal S1 S2,RRR  Respiratory: Lungs clear to auscultation	  Gastrointestinal:  Soft, Non-tender, + BS	  Extremities: Normal range of motion, No clubbing, cyanosis or edema      Home Medications:  METFORMIN HCL 1,000 MG TABLET: TAKE 1 TABLET BY MOUTH TWICE A DAY WITH MEALS (02 Mar 2024 20:59)  METOPROLOL SUCC ER 50 MG TAB: TAKE 1 TABLET BY MOUTH EVERY DAY (02 Mar 2024 20:59)      MEDICATIONS  (STANDING):  acetaZOLAMIDE Injectable 500 milliGRAM(s) IV Push once  aspirin enteric coated 81 milliGRAM(s) Oral daily  buMETAnide 2 milliGRAM(s) Oral two times a day  dextrose 5%. 1000 milliLiter(s) (50 mL/Hr) IV Continuous <Continuous>  dextrose 5%. 1000 milliLiter(s) (100 mL/Hr) IV Continuous <Continuous>  dextrose 50% Injectable 25 Gram(s) IV Push once  dextrose 50% Injectable 12.5 Gram(s) IV Push once  dextrose 50% Injectable 25 Gram(s) IV Push once  glucagon  Injectable 1 milliGRAM(s) IntraMuscular once  influenza   Vaccine 0.5 milliLiter(s) IntraMuscular once  insulin lispro (ADMELOG) corrective regimen sliding scale   SubCutaneous three times a day before meals  insulin lispro (ADMELOG) corrective regimen sliding scale   SubCutaneous at bedtime  losartan 25 milliGRAM(s) Oral daily  metoprolol succinate  milliGRAM(s) Oral daily  potassium chloride    Tablet ER 40 milliEquivalent(s) Oral once  spironolactone 50 milliGRAM(s) Oral daily      TELEMETRY: snr 	    ECG:  	  RADIOLOGY:   DIAGNOSTIC TESTING:  [ ] Echocardiogram:  [ ]  Catheterization:  [ ] Stress Test:    OTHER: 	    LABS:	 	                            12.4   4.85  )-----------( 259      ( 19 Mar 2024 06:55 )             37.6     03-19    138  |  91<L>  |  53<H>  ----------------------------<  108<H>  3.7   |  37<H>  |  1.36<H>    Ca    9.3      19 Mar 2024 06:55  Phos  3.7     03-19  Mg     2.10     03-19      PT/INR - ( 17 Mar 2024 13:23 )   PT: 12.7 sec;   INR: 1.13 ratio         PTT - ( 17 Mar 2024 13:23 )  PTT:32.4 sec

## 2024-03-20 ENCOUNTER — TRANSCRIPTION ENCOUNTER (OUTPATIENT)
Age: 64
End: 2024-03-20

## 2024-03-20 LAB
ANION GAP SERPL CALC-SCNC: 12 MMOL/L — SIGNIFICANT CHANGE UP (ref 7–14)
BASE EXCESS BLDA CALC-SCNC: 14 MMOL/L — HIGH (ref -2–3)
BASE EXCESS BLDV CALC-SCNC: 13.6 MMOL/L — HIGH (ref -2–3)
BUN SERPL-MCNC: 58 MG/DL — HIGH (ref 7–23)
CALCIUM SERPL-MCNC: 8.8 MG/DL — SIGNIFICANT CHANGE UP (ref 8.4–10.5)
CHLORIDE SERPL-SCNC: 95 MMOL/L — LOW (ref 98–107)
CO2 BLDA-SCNC: 41 MMOL/L — HIGH (ref 19–24)
CO2 BLDV-SCNC: 42.1 MMOL/L — HIGH (ref 22–26)
CO2 SERPL-SCNC: 35 MMOL/L — HIGH (ref 22–31)
CREAT SERPL-MCNC: 1.67 MG/DL — HIGH (ref 0.5–1.3)
EGFR: 45 ML/MIN/1.73M2 — LOW
GLUCOSE BLDC GLUCOMTR-MCNC: 137 MG/DL — HIGH (ref 70–99)
GLUCOSE BLDC GLUCOMTR-MCNC: 151 MG/DL — HIGH (ref 70–99)
GLUCOSE BLDC GLUCOMTR-MCNC: 164 MG/DL — HIGH (ref 70–99)
GLUCOSE SERPL-MCNC: 137 MG/DL — HIGH (ref 70–99)
HCO3 BLDA-SCNC: 40 MMOL/L — HIGH (ref 21–28)
HCO3 BLDV-SCNC: 40 MMOL/L — HIGH (ref 22–29)
HCT VFR BLD CALC: 37.9 % — LOW (ref 39–50)
HGB BLD-MCNC: 11.8 G/DL — LOW (ref 13–17)
HOROWITZ INDEX BLDA+IHG-RTO: 21 — SIGNIFICANT CHANGE UP
MAGNESIUM SERPL-MCNC: 2.2 MG/DL — SIGNIFICANT CHANGE UP (ref 1.6–2.6)
MCHC RBC-ENTMCNC: 28.1 PG — SIGNIFICANT CHANGE UP (ref 27–34)
MCHC RBC-ENTMCNC: 31.1 GM/DL — LOW (ref 32–36)
MCV RBC AUTO: 90.2 FL — SIGNIFICANT CHANGE UP (ref 80–100)
NRBC # BLD: 0 /100 WBCS — SIGNIFICANT CHANGE UP (ref 0–0)
NRBC # FLD: 0 K/UL — SIGNIFICANT CHANGE UP (ref 0–0)
PCO2 BLDA: 52 MMHG — HIGH (ref 35–48)
PCO2 BLDV: 58 MMHG — HIGH (ref 42–55)
PH BLDA: 7.49 — HIGH (ref 7.35–7.45)
PH BLDV: 7.45 — HIGH (ref 7.32–7.43)
PHOSPHATE SERPL-MCNC: 4 MG/DL — SIGNIFICANT CHANGE UP (ref 2.5–4.5)
PLATELET # BLD AUTO: 240 K/UL — SIGNIFICANT CHANGE UP (ref 150–400)
PO2 BLDA: 81 MMHG — LOW (ref 83–108)
PO2 BLDV: 57 MMHG — HIGH (ref 25–45)
POTASSIUM SERPL-MCNC: 3.8 MMOL/L — SIGNIFICANT CHANGE UP (ref 3.5–5.3)
POTASSIUM SERPL-SCNC: 3.8 MMOL/L — SIGNIFICANT CHANGE UP (ref 3.5–5.3)
RBC # BLD: 4.2 M/UL — SIGNIFICANT CHANGE UP (ref 4.2–5.8)
RBC # FLD: 14.2 % — SIGNIFICANT CHANGE UP (ref 10.3–14.5)
SAO2 % BLDA: 96.8 % — SIGNIFICANT CHANGE UP (ref 94–98)
SAO2 % BLDV: 86.6 % — SIGNIFICANT CHANGE UP (ref 67–88)
SODIUM SERPL-SCNC: 142 MMOL/L — SIGNIFICANT CHANGE UP (ref 135–145)
WBC # BLD: 7.47 K/UL — SIGNIFICANT CHANGE UP (ref 3.8–10.5)
WBC # FLD AUTO: 7.47 K/UL — SIGNIFICANT CHANGE UP (ref 3.8–10.5)

## 2024-03-20 PROCEDURE — 99231 SBSQ HOSP IP/OBS SF/LOW 25: CPT | Mod: 24

## 2024-03-20 PROCEDURE — 93010 ELECTROCARDIOGRAM REPORT: CPT

## 2024-03-20 RX ORDER — BUMETANIDE 0.25 MG/ML
3 INJECTION INTRAMUSCULAR; INTRAVENOUS DAILY
Refills: 0 | Status: DISCONTINUED | OUTPATIENT
Start: 2024-03-21 | End: 2024-03-21

## 2024-03-20 RX ADMIN — Medication 1: at 18:08

## 2024-03-20 RX ADMIN — BUMETANIDE 2 MILLIGRAM(S): 0.25 INJECTION INTRAMUSCULAR; INTRAVENOUS at 05:37

## 2024-03-20 RX ADMIN — SPIRONOLACTONE 50 MILLIGRAM(S): 25 TABLET, FILM COATED ORAL at 05:37

## 2024-03-20 RX ADMIN — Medication 100 MILLIGRAM(S): at 05:36

## 2024-03-20 RX ADMIN — Medication 300 MILLIGRAM(S): at 05:40

## 2024-03-20 RX ADMIN — LOSARTAN POTASSIUM 25 MILLIGRAM(S): 100 TABLET, FILM COATED ORAL at 05:37

## 2024-03-20 RX ADMIN — Medication 1: at 09:54

## 2024-03-20 NOTE — DISCHARGE NOTE PROVIDER - PROVIDER TOKENS
PROVIDER:[TOKEN:[[id number]],FOLLOWUP:[1 week]],PROVIDER:[TOKEN:[[id number]],FOLLOWUP:[Routine]]

## 2024-03-20 NOTE — PROGRESS NOTE ADULT - ASSESSMENT
63 yr old male with a pmh of HTN, HF rEF ( EF33%), T2DM on metformin who presents with ROSS and bilateral lower extremity edema with his legs feeling heavy.     #Acute on chronic systolic heart Failure : due to non complaince with meds : d/w pt and niece need to adhere with meds and need for AICD after re-evaluation of compliance   cont diuresis   changed bumex to 3 mg PO daily   hold zaroxlyn   cardio following   c/w toprol / aldactone    # DONNA :   decrease bumex to 3 mg daily from 2 mg bid   d/c zaroxlyn   monitor renal fx     #syncope  #NSVT on tele   on BBlocker   seen by EP   s/p AICD implant    #Hypertensive   controlled now     #DM-2   a1c 7.1  c/w Insulin / FSBS    #HLD  -cont statin    # non compliance with meds :  counseling done     dispo: f/u renal fx in am

## 2024-03-20 NOTE — PROGRESS NOTE ADULT - SUBJECTIVE AND OBJECTIVE BOX
[facility] NEPHROLOGY- PROGRESS NOTE    63y Male with history of CHF presents with SOB. Nephrology consulted for elevated Scr.    REVIEW OF SYSTEMS:  Gen: no fevers  Cards: no chest pain  Resp: no dyspnea  GI: no nausea or vomiting or diarrhea  Vascular: + LE edema improving    No Known Allergies      Hospital Medications: Medications reviewed      VITALS:  T(F): 98.1 (03-20-24 @ 05:30), Max: 98.1 (03-20-24 @ 05:30)  HR: 75 (03-20-24 @ 05:30)  BP: 120/71 (03-20-24 @ 05:30)  RR: 18 (03-20-24 @ 05:30)  SpO2: 98% (03-20-24 @ 05:30)  Wt(kg): --    03-19 @ 07:01  -  03-20 @ 07:00  --------------------------------------------------------  IN: 0 mL / OUT: 200 mL / NET: -200 mL        PHYSICAL EXAM:    Gen: NAD, calm  Cards: RRR, +S1/S2, no M/G/R  Resp: CTA B/L  GI: soft, NT/ND, NABS  Vascular: trace LE edema B/L with skin wrinkling      LABS:  03-19    138  |  91<L>  |  53<H>  ----------------------------<  108<H>  3.7   |  37<H>  |  1.36<H>    Ca    9.3      19 Mar 2024 06:55  Phos  3.7     03-19  Mg     2.10     03-19      Creatinine Trend: 1.36 <--, 1.44 <--, 1.54 <--, 1.42 <--, 1.41 <--, 1.40 <--                        12.4   4.85  )-----------( 259      ( 19 Mar 2024 06:55 )             37.6     Urine Studies:  Urinalysis Basic - ( 19 Mar 2024 06:55 )    Color:  / Appearance:  / SG:  / pH:   Gluc: 108 mg/dL / Ketone:   / Bili:  / Urobili:    Blood:  / Protein:  / Nitrite:    Leuk Esterase:  / RBC:  / WBC    Sq Epi:  / Non Sq Epi:  / Bacteria:       Creatinine, Random Urine: 27 mg/dL (03-19 @ 01:00)  Protein/Creatinine Ratio Calculation: 0.3 Ratio (03-19 @ 01:00)

## 2024-03-20 NOTE — PROGRESS NOTE ADULT - SUBJECTIVE AND OBJECTIVE BOX
Interval History:  No acute events overnight  Telemetry: NSR; A paced on demand    MEDICATIONS  (STANDING):  aspirin enteric coated 81 milliGRAM(s) Oral daily  buMETAnide 2 milliGRAM(s) Oral two times a day  dextrose 5%. 1000 milliLiter(s) (50 mL/Hr) IV Continuous <Continuous>  dextrose 5%. 1000 milliLiter(s) (100 mL/Hr) IV Continuous <Continuous>  dextrose 50% Injectable 25 Gram(s) IV Push once  dextrose 50% Injectable 12.5 Gram(s) IV Push once  dextrose 50% Injectable 25 Gram(s) IV Push once  glucagon  Injectable 1 milliGRAM(s) IntraMuscular once  influenza   Vaccine 0.5 milliLiter(s) IntraMuscular once  insulin lispro (ADMELOG) corrective regimen sliding scale   SubCutaneous three times a day before meals  insulin lispro (ADMELOG) corrective regimen sliding scale   SubCutaneous at bedtime  losartan 25 milliGRAM(s) Oral daily  metoprolol succinate  milliGRAM(s) Oral daily  potassium chloride    Tablet ER 40 milliEquivalent(s) Oral once  spironolactone 50 milliGRAM(s) Oral daily    MEDICATIONS  (PRN):  acetaminophen     Tablet .. 650 milliGRAM(s) Oral every 6 hours PRN Temp greater or equal to 38C (100.4F), Mild Pain (1 - 3)  aluminum hydroxide/magnesium hydroxide/simethicone Suspension 30 milliLiter(s) Oral every 4 hours PRN Dyspepsia  dextrose Oral Gel 15 Gram(s) Oral once PRN Blood Glucose LESS THAN 70 milliGRAM(s)/deciliter  melatonin 3 milliGRAM(s) Oral at bedtime PRN Insomnia  ondansetron Injectable 4 milliGRAM(s) IV Push every 8 hours PRN Nausea and/or Vomiting    Vital Signs Last 24 Hrs  T(C): 36.7 (03-20-24 @ 05:30), Max: 36.7 (03-20-24 @ 05:30)  T(F): 98.1 (03-20-24 @ 05:30), Max: 98.1 (03-20-24 @ 05:30)  HR: 75 (03-20-24 @ 05:30) (73 - 75)  BP: 120/71 (03-20-24 @ 05:30) (112/75 - 120/71)  BP(mean): --  RR: 18 (03-20-24 @ 05:30) (18 - 18)  SpO2: 98% (03-20-24 @ 05:30) (95% - 98%)    Appearance: Normal	  HEENT:   Normal oral mucosa, PERRL, EOMI	  Lymphatic: No lymphadenopathy  Cardiovascular: Normal S1 S2, No JVD, No murmurs, No edema  Respiratory: Lungs clear to auscultation	  Psychiatry: A & O x 3, Mood & affect appropriate  Gastrointestinal:  Soft, Non-tender, + BS	  Skin: No rashes, No ecchymoses, No cyanosis	  Neurologic: Non-focal  Extremities: Normal range of motion, No clubbing, cyanosis or edema  Vascular: Peripheral pulses palpable 2+ bilaterally    LABS:	 	    CBC Full  -  ( 19 Mar 2024 06:55 )  WBC Count : 4.85 K/uL  Hemoglobin : 12.4 g/dL  Hematocrit : 37.6 %  Platelet Count - Automated : 259 K/uL  Mean Cell Volume : 88.1 fL  Mean Cell Hemoglobin : 29.0 pg  Mean Cell Hemoglobin Concentration : 33.0 gm/dL  Auto Neutrophil # : x  Auto Lymphocyte # : x  Auto Monocyte # : x  Auto Eosinophil # : x  Auto Basophil # : x  Auto Neutrophil % : x  Auto Lymphocyte % : x  Auto Monocyte % : x  Auto Eosinophil % : x  Auto Basophil % : x    03-19    138  |  91<L>  |  53<H>  ----------------------------<  108<H>  3.7   |  37<H>  |  1.36<H>    Ca    9.3      19 Mar 2024 06:55  Phos  3.7     03-19  Mg     2.10     03-19

## 2024-03-20 NOTE — PROGRESS NOTE ADULT - SUBJECTIVE AND OBJECTIVE BOX
Patient is a 63y old  Male who presents with a chief complaint of Acute decompensated heart failure (20 Mar 2024 16:21)      INTERVAL HPI/OVERNIGHT EVENTS: sen and examined, denies any c/o , was lethargic earlier   T(C): 36.3 (03-20-24 @ 20:09), Max: 36.7 (03-20-24 @ 05:30)  HR: 90 (03-20-24 @ 20:09) (74 - 90)  BP: 140/90 (03-20-24 @ 20:09) (118/76 - 140/90)  RR: 18 (03-20-24 @ 20:09) (16 - 18)  SpO2: 98% (03-20-24 @ 20:09) (96% - 98%)  Wt(kg): --  I&O's Summary    19 Mar 2024 07:01  -  20 Mar 2024 07:00  --------------------------------------------------------  IN: 0 mL / OUT: 200 mL / NET: -200 mL    20 Mar 2024 07:01  -  20 Mar 2024 22:38  --------------------------------------------------------  IN: 600 mL / OUT: 800 mL / NET: -200 mL        PAST MEDICAL & SURGICAL HISTORY:  HTN (hypertension)      HLD (hyperlipidemia)      DM (diabetes mellitus)      HFrEF (heart failure with reduced ejection fraction)      No significant past surgical history          SOCIAL HISTORY  Alcohol:  Tobacco:  Illicit substance use:    FAMILY HISTORY:    REVIEW OF SYSTEMS:  CONSTITUTIONAL: No fever, weight loss, or fatigue  EYES: No eye pain, visual disturbances, or discharge  ENMT:  No difficulty hearing, tinnitus, vertigo; No sinus or throat pain  NECK: No pain or stiffness  RESPIRATORY: No cough, wheezing, chills or hemoptysis; No shortness of breath  CARDIOVASCULAR: No chest pain, palpitations, dizziness, or leg swelling  GASTROINTESTINAL: No abdominal or epigastric pain. No nausea, vomiting, or hematemesis; No diarrhea or constipation. No melena or hematochezia.  GENITOURINARY: No dysuria, frequency, hematuria, or incontinence  NEUROLOGICAL: No headaches, memory loss, loss of strength, numbness, or tremors  SKIN: No itching, burning, rashes, or lesions   LYMPH NODES: No enlarged glands  ENDOCRINE: No heat or cold intolerance; No hair loss  MUSCULOSKELETAL: No joint pain or swelling; No muscle, back, or extremity pain  PSYCHIATRIC: No depression, anxiety, mood swings, or difficulty sleeping  HEME/LYMPH: No easy bruising, or bleeding gums  ALLERY AND IMMUNOLOGIC: No hives or eczema    RADIOLOGY & ADDITIONAL TESTS:    Imaging Personally Reviewed:  [ ] YES  [ ] NO    Consultant(s) Notes Reviewed:  [ ] YES  [ ] NO    PHYSICAL EXAM:  GENERAL: NAD, well-groomed, well-developed  HEAD:  Atraumatic, Normocephalic  EYES: EOMI, PERRLA, conjunctiva and sclera clear  ENMT: No tonsillar erythema, exudates, or enlargement; Moist mucous membranes, Good dentition, No lesions  NECK: Supple, No JVD, Normal thyroid  NERVOUS SYSTEM:  Alert & Oriented X3, Good concentration; Motor Strength 5/5 B/L upper and lower extremities; DTRs 2+ intact and symmetric  CHEST/LUNG: Clear to percussion bilaterally; No rales, rhonchi, wheezing, or rubs  HEART: Regular rate and rhythm; No murmurs, rubs, or gallops  ABDOMEN: Soft, Nontender, Nondistended; Bowel sounds present  EXTREMITIES:  2+ Peripheral Pulses, No clubbing, cyanosis, or edema  LYMPH: No lymphadenopathy noted  SKIN: No rashes or lesions    LABS:                        11.8   7.47  )-----------( 240      ( 20 Mar 2024 17:57 )             37.9     03-20    142  |  95<L>  |  58<H>  ----------------------------<  137<H>  3.8   |  35<H>  |  1.67<H>    Ca    8.8      20 Mar 2024 17:57  Phos  4.0     03-20  Mg     2.20     03-20        Urinalysis Basic - ( 20 Mar 2024 17:57 )    Color: x / Appearance: x / SG: x / pH: x  Gluc: 137 mg/dL / Ketone: x  / Bili: x / Urobili: x   Blood: x / Protein: x / Nitrite: x   Leuk Esterase: x / RBC: x / WBC x   Sq Epi: x / Non Sq Epi: x / Bacteria: x      CAPILLARY BLOOD GLUCOSE      POCT Blood Glucose.: 151 mg/dL (20 Mar 2024 17:26)  POCT Blood Glucose.: 137 mg/dL (20 Mar 2024 12:46)  POCT Blood Glucose.: 164 mg/dL (20 Mar 2024 09:14)  POCT Blood Glucose.: 195 mg/dL (19 Mar 2024 22:49)    ABG - ( 20 Mar 2024 18:20 )  pH, Arterial: 7.49  pH, Blood: x     /  pCO2: 52    /  pO2: 81    / HCO3: 40    / Base Excess: 14.0  /  SaO2: 96.8                Urinalysis Basic - ( 20 Mar 2024 17:57 )    Color: x / Appearance: x / SG: x / pH: x  Gluc: 137 mg/dL / Ketone: x  / Bili: x / Urobili: x   Blood: x / Protein: x / Nitrite: x   Leuk Esterase: x / RBC: x / WBC x   Sq Epi: x / Non Sq Epi: x / Bacteria: x        MEDICATIONS  (STANDING):  aspirin enteric coated 81 milliGRAM(s) Oral daily  dextrose 5%. 1000 milliLiter(s) (100 mL/Hr) IV Continuous <Continuous>  dextrose 5%. 1000 milliLiter(s) (50 mL/Hr) IV Continuous <Continuous>  dextrose 50% Injectable 25 Gram(s) IV Push once  dextrose 50% Injectable 12.5 Gram(s) IV Push once  dextrose 50% Injectable 25 Gram(s) IV Push once  glucagon  Injectable 1 milliGRAM(s) IntraMuscular once  influenza   Vaccine 0.5 milliLiter(s) IntraMuscular once  insulin lispro (ADMELOG) corrective regimen sliding scale   SubCutaneous three times a day before meals  insulin lispro (ADMELOG) corrective regimen sliding scale   SubCutaneous at bedtime  losartan 25 milliGRAM(s) Oral daily  metoprolol succinate  milliGRAM(s) Oral daily  spironolactone 50 milliGRAM(s) Oral daily    MEDICATIONS  (PRN):  acetaminophen     Tablet .. 650 milliGRAM(s) Oral every 6 hours PRN Temp greater or equal to 38C (100.4F), Mild Pain (1 - 3)  aluminum hydroxide/magnesium hydroxide/simethicone Suspension 30 milliLiter(s) Oral every 4 hours PRN Dyspepsia  dextrose Oral Gel 15 Gram(s) Oral once PRN Blood Glucose LESS THAN 70 milliGRAM(s)/deciliter  melatonin 3 milliGRAM(s) Oral at bedtime PRN Insomnia  ondansetron Injectable 4 milliGRAM(s) IV Push every 8 hours PRN Nausea and/or Vomiting      Care Discussed with Consultants/Other Providers [ ] YES  [ ] NO

## 2024-03-20 NOTE — DISCHARGE NOTE PROVIDER - NSDCFUSCHEDAPPT_GEN_ALL_CORE_FT
[facility] Physician [facility] [phone]  Scheduled Appointment: 04/04/2024

## 2024-03-20 NOTE — PROGRESS NOTE ADULT - ASSESSMENT
62 y/o male with PMHx of HTN, NICM (EF 27%), T2DM on metformin who presented with acute on chronic ADHF. worsening since Thanksgiving 2023, and Pt endorses 2 falls due to lower extremity weakness but denies syncope. Per records patient noncompliant, hospitalization for HF in 2023, NSVT ( telemetry : longest run 39 beats). Of note, TTE: LVEF 27% RV dysfunction, LHC: coronaries with minor luminal irregularities, EKG from 3/2023 without RBBB/LBBB; normal QRS duration. Patient on aggressive diureses. EP consulted for ICD therapy and deemed pt. is not a candidate for ICD given his noncompliance and not on GDMT x 3 months.     #HFrEF/NICM  #NSVT  - Continue to monitor on tele  - Replete K to 4 and Mg to 2  - Continue metoprolol succinate  mg QD  - S/p ICD; site clean dry and intact- dressing in place   - Discharge instructions reviewed with patient and written instructions given  Patient states he understands he has to remove outer dressing 24 hours post, after which can shower with only water at the site  Was told not to rub, apply lotion, soap or cream to site  Activity restrictions reviewed including not lifting left arm over shoulder and lifting >10 lbs until cleared at his follow up appointment   Patient will call office [phone] if he has any questions/concerns or experiences fever, chills, or redness/swelling/increased pain at the incision site   Patient directed to the closest ER if experiencing any severe symptoms including chest pain, SOB, lightheadedness, dizziness, syncope  Patient understands no MRI x 6 weeks after procedure   Follow up appointment date and time given to patient

## 2024-03-20 NOTE — PROGRESS NOTE ADULT - ASSESSMENT
ECHO 3/4/23: EF 33% mild MR, Severe global left ventricular systolic dysfunction  cardiac cath from 3/6/23  Coronaries with minor luminal irregularities. Elevated LVEDP.      A/P  63 yr old male with a pmh of HTN, HF rEF ( EF33%), T2DM on metformin who presents with ROSS and bilateral lower extremity edema with his legs feeling heavy.     #Acute on chronic HFrEF  -vol status improved  -change bumex to 3mg PO daily  -hold metolazone  -supplement k  -continue aldactone 50mg qd  -Not compliant with home meds (lasix 40mg daily, metoprolol XL 50mg daily, hydralazine 25mg TID)  -repeat echo this admission with ef 27%   Left ventricular systolic function is severely decreased  -Prior cardiac cath from 3/6/23  Coronaries with minor luminal irregularities. Elevated LVEDP.   -strict I/O, monitor daily weights, fluid restriction   -Continue toprol 300mg PO daily-- prior events on tele noted   -eps eval noted for NSVT  -PENDING  ICD , patient agreeable   -per [employer] insurance does not cover entresto   -c/w losartan   -off hydral to allow bp room for aldactone    #syncope, loc  -depressed ed, ivcd, nsvt on tele, up to 31 beats  -eps eval noted   -ct head unremarkable   -s/p icd     #Hypertensive urgency.   -bp improved   -continue meds     #T2DM (type 2 diabetes mellitus).   -med f/u     #HLD  -cont statin    dvt ppx    35 minutes spent on total encounter; more than 50% of the visit was spent counseling and/or coordinating care by the attending physician.,

## 2024-03-20 NOTE — DISCHARGE NOTE PROVIDER - HOSPITAL COURSE
63 yr old male with a pmh of HTN, HF rEF ( EF33%), T2DM on metformin who presents with ROSS and bilateral lower extremity edema with his legs feeling heavy.   #Acute on chronic systolic heart Failure : due to non complaince with meds : d/w pt and niece need to adhere with meds and need for AICD after re-evaluation of compliance   cont diuresis  - now bumex changed to 2 mg daily  - off metolazone   # DONNA : resolved   #syncope  #NSVT on tele on BBlocker  seen by EP  s/p AICD implant  #Hypertensive controlled now   #DM-2  -a1c 7.1 - c/w Insulin / FSBS  #HLD -cont statin  # non compliance with meds : counseling done     dispo: stable for d/c pending - home with home care and RW

## 2024-03-20 NOTE — DISCHARGE NOTE PROVIDER - NSDCCPCAREPLAN_GEN_ALL_CORE_FT
PRINCIPAL DISCHARGE DIAGNOSIS  Diagnosis: Acute on chronic systolic congestive heart failure  Assessment and Plan of Treatment: continue diuretics and potassium repletion   - strict I & O/'s  - daily weights , fluid restriction   - AICD placed this admission . Please follow all post procedure instructions .   follow up appointment made for 4/4/24      SECONDARY DISCHARGE DIAGNOSES  Diagnosis: Hypertension  Assessment and Plan of Treatment: Low sodium and fat diet, continue anti-hypertensive medications, and follow up with primary care physician.    Diagnosis: Diabetes  Assessment and Plan of Treatment: Monitor finger sticks pre-meal and bedtime, low salt, fat and carbohydrate diet, minimize glucose intake.  Exercise daily for at least 30 minutes and weight loss.  Follow up with primary care physician and endocrinologist for routine Hemoglobin A1C checks and management.  Follow up with your ophthalmologist for routine yearly vision exams.    Diagnosis: Hyperlipidemia  Assessment and Plan of Treatment: Low fat diet, exercise daily and continue current medications. Follow up with primary care physician and cardiologist for management.     PRINCIPAL DISCHARGE DIAGNOSIS  Diagnosis: Acute on chronic systolic congestive heart failure  Assessment and Plan of Treatment: Continue regimen from hospital. Follow heart healthy diet. Monitor weight. If you develop severe lower extremity swelling and shortness of breath please seek medial attention. Follow up with your PCP and cardiologist for further evaluation and managment. Please call to make an appointment.   - AICD placed this admission . Please follow all post procedure instructions .   follow up appointment made for 4/4/24        SECONDARY DISCHARGE DIAGNOSES  Diagnosis: Hypertension  Assessment and Plan of Treatment: Continue blood pressure medication regimen as directed. Monitor for any visual changes, headaches or dizziness.  Monitor blood pressure regularly.  Follow up with your PCP for further management for high blood pressure.      Diagnosis: Diabetes  Assessment and Plan of Treatment: Monitor finger sticks pre-meal and bedtime, low salt, fat and carbohydrate diet, minimize glucose intake.  Exercise daily for at least 30 minutes and weight loss.  Follow up with primary care physician and endocrinologist for routine Hemoglobin A1C checks and management.  Follow up with your ophthalmologist for routine yearly vision exams.    Diagnosis: Hyperlipidemia  Assessment and Plan of Treatment: Low fat diet, exercise daily and continue current medications. Follow up with primary care physician and cardiologist for management.  Continue cholesterol control medications. Continue DASH diet. Follow up with your PCP within 1 week of discharge for further management and monitoring of lipid and cholesterol panels.

## 2024-03-20 NOTE — CHART NOTE - NSCHARTNOTEFT_GEN_A_CORE
Called by RN for lethargy   Patient laying in bed in NAD . Now awake , alert and oriented x 4 . EKG in progress   Answering all questions , following commands. Offers no complaints     Vitals stable   Neuro : No facial asymmetry , tongue midline , strength equal and intact b/l upper and lower extremities   PERRLA , EOMI b/l   LABS- Pending       64 yo M with htn and CHF s/p AICD has been lethargic now Awake alert and oriented  - monitor vitals Q 4hrs   - ABG sent STAT   - All labs pending will follow up   - monitor fingersticks Called by RN for lethargy   Patient laying in bed in NAD . Now awake , alert and oriented x 4 . EKG in progress   Answering all questions , following commands. Offers no complaints     Vitals stable   Neuro : No facial asymmetry , tongue midline , strength equal and intact b/l upper and lower extremities   PERRLA , EOMI b/l   LABS- Pending       64 yo M with htn and CHF s/p AICD has been lethargic now Awake alert and oriented  - monitor vitals Q 4hrs   - ABG sent STAT   - All labs pending will follow up   - monitor fingersticks  EKG with no acute ST changes

## 2024-03-20 NOTE — PROGRESS NOTE ADULT - SUBJECTIVE AND OBJECTIVE BOX
CARDIOLOGY FOLLOW UP - Dr. Zavala  Date of Service: 3/20/2024  CC: no events    Review of Systems:  Constitutional: No fever, weight loss, or fatigue  Respiratory: No cough, wheezing, or hemoptysis, no shortness of breath  Cardiovascular: No chest pain, palpitations, passing out, dizziness, or leg swelling  Gastrointestinal: No abd or epigastric pain. No nausea, vomiting, or hematemesis; no diarrhea or consiptaiton, no melena or hematochezia  Vascular: No edema     TELEMETRY:    PHYSICAL EXAM:  T(C): 36.7 (03-20-24 @ 05:30), Max: 36.7 (03-20-24 @ 05:30)  HR: 75 (03-20-24 @ 05:30) (73 - 75)  BP: 120/71 (03-20-24 @ 05:30) (112/75 - 120/71)  RR: 18 (03-20-24 @ 05:30) (18 - 18)  SpO2: 98% (03-20-24 @ 05:30) (95% - 98%)  Wt(kg): --  I&O's Summary    19 Mar 2024 07:01  -  20 Mar 2024 07:00  --------------------------------------------------------  IN: 0 mL / OUT: 200 mL / NET: -200 mL        Appearance: Normal	  Cardiovascular: Normal S1 S2,RRR, No JVD, No murmurs  Respiratory: Lungs clear to auscultation	  Gastrointestinal:  Soft, Non-tender, + BS	  Extremities: Normal range of motion, No clubbing, cyanosis or edema  Vascular: Peripheral pulses palpable 2+ bilaterally       Home Medications:  METFORMIN HCL 1,000 MG TABLET: TAKE 1 TABLET BY MOUTH TWICE A DAY WITH MEALS (02 Mar 2024 20:59)  METOPROLOL SUCC ER 50 MG TAB: TAKE 1 TABLET BY MOUTH EVERY DAY (02 Mar 2024 20:59)        MEDICATIONS  (STANDING):  aspirin enteric coated 81 milliGRAM(s) Oral daily  buMETAnide 2 milliGRAM(s) Oral two times a day  dextrose 5%. 1000 milliLiter(s) (50 mL/Hr) IV Continuous <Continuous>  dextrose 5%. 1000 milliLiter(s) (100 mL/Hr) IV Continuous <Continuous>  dextrose 50% Injectable 25 Gram(s) IV Push once  dextrose 50% Injectable 12.5 Gram(s) IV Push once  dextrose 50% Injectable 25 Gram(s) IV Push once  glucagon  Injectable 1 milliGRAM(s) IntraMuscular once  influenza   Vaccine 0.5 milliLiter(s) IntraMuscular once  insulin lispro (ADMELOG) corrective regimen sliding scale   SubCutaneous three times a day before meals  insulin lispro (ADMELOG) corrective regimen sliding scale   SubCutaneous at bedtime  losartan 25 milliGRAM(s) Oral daily  metoprolol succinate  milliGRAM(s) Oral daily  potassium chloride    Tablet ER 40 milliEquivalent(s) Oral once  spironolactone 50 milliGRAM(s) Oral daily        EKG:  RADIOLOGY:  DIAGNOSTIC TESTING:  [ ] Echocardiogram:  [ ] Catherterization:  [ ] Stress Test:  OTHER:     LABS:	 	                          12.4   4.85  )-----------( 259      ( 19 Mar 2024 06:55 )             37.6     03-19    138  |  91<L>  |  53<H>  ----------------------------<  108<H>  3.7   |  37<H>  |  1.36<H>    Ca    9.3      19 Mar 2024 06:55  Phos  3.7     03-19  Mg     2.10     03-19            CARDIAC MARKERS:

## 2024-03-20 NOTE — PROGRESS NOTE ADULT - ASSESSMENT
63y Male with history of CHF presents with SOB. Nephrology consulted for elevated Scr.    1. CKD-3a: Giovanni Scr 1-1.2 however likely dilutional in setting of volume overload. Suspect current values represent true baseline. UA bland with mild proteinuria (spot urine TP/CR 0.3). Bladder scan negative. Avoid nephrotoxins. Monitor electrolytes.    2. Hypokalemia: Resolved. Continue with aldactone. Will increase to 50 mg twice daily if patient remains persistently hypokalemic. Monitor serum potassium.    3. Metabolic alkalosis: S/P diamox 500 mg IV X 1 dose on 3/19. Please repeat blood gas. Monitor pH.    4. HTN with CKD: BP controlled. Continue with current medications. Monitor BP.    5. Acute on chronic systolic HF: Improving. Bumex changed to 3 mg PO daily as per cardiology. TTE with severely decreased LVSF. Monitor .      [facility] NEPHROLOGY  Adi Diaz M.D.  Nj Abarca D.O.  Kezia Dunn M.D.  MD Ute Humphrey, MSN, ANP-C    Telephone: [phone]  Facsimile: [phone]    [address] [place] [address], #[place] [address]

## 2024-03-20 NOTE — DISCHARGE NOTE PROVIDER - NSDCFUADDAPPT_GEN_ALL_CORE_FT
-remove outer dressing 24 hours post, after which can shower with only water at the site  -Was told not to rub, apply lotion, soap or cream to site  -Activity restrictions reviewed including not lifting left arm over shoulder and lifting >10 lbs until cleared at his follow up appointment   - Please call office [phone] if he has any questions/concerns or experiences fever, chills, or redness/swelling/increased pain at the incision site   - Go  to the closest ER if experiencing any severe symptoms including chest pain, SOB, lightheadedness, dizziness, syncope  - No MRI x 6 weeks after procedure   - Follow up appointment date and time given to patient 4/4 at 11:20

## 2024-03-20 NOTE — DISCHARGE NOTE PROVIDER - NSDCMRMEDTOKEN_GEN_ALL_CORE_FT
aspirin 81 mg oral delayed release tablet: 1 tab(s) orally once a day  furosemide 40 mg oral tablet: 1 tab(s) orally once a day  hydrALAZINE 25 mg oral tablet: 1 tab(s) orally 3 times a day  METFORMIN HCL 1,000 MG TABLET: TAKE 1 TABLET BY MOUTH TWICE A DAY WITH MEALS  METOPROLOL SUCC ER 50 MG TAB: TAKE 1 TABLET BY MOUTH EVERY DAY  sacubitril-valsartan 24 mg-26 mg oral tablet: 1 tab(s) orally 2 times a day Test Script [facility] [id number]   aspirin 81 mg oral delayed release tablet: 1 tab(s) orally once a day  hydrALAZINE 25 mg oral tablet: 1 tab(s) orally 3 times a day  METFORMIN HCL 1,000 MG TABLET: TAKE 1 TABLET BY MOUTH TWICE A DAY WITH MEALS  METOPROLOL SUCC ER 50 MG TAB: TAKE 1 TABLET BY MOUTH EVERY DAY  sacubitril-valsartan 24 mg-26 mg oral tablet: 1 tab(s) orally 2 times a day Test Script [facility] [id number]   aspirin 81 mg oral delayed release tablet: 1 tab(s) orally once a day  bumetanide 2 mg oral tablet: 1 tab(s) orally once a day  losartan 25 mg oral tablet: 1 tab(s) orally once a day  METFORMIN HCL 1,000 MG TABLET: TAKE 1 TABLET BY MOUTH TWICE A DAY WITH MEALS  metoprolol succinate 100 mg oral tablet, extended release: 3 tab(s) orally once a day  spironolactone 25 mg oral tablet: 2 tab(s) orally once a day   aspirin 81 mg oral delayed release tablet: 1 tab(s) orally once a day  bumetanide 2 mg oral tablet: 1 tab(s) orally once a day  losartan 25 mg oral tablet: 1 tab(s) orally once a day  METFORMIN HCL 1,000 MG TABLET: TAKE 1 TABLET BY MOUTH TWICE A DAY WITH MEALS  metoprolol succinate 100 mg oral tablet, extended release: 3 tab(s) orally once a day  Potassium Chloride (Eqv-Klor-Con M20) 20 mEq oral tablet, extended release: 1 tab(s) orally once a day  spironolactone 25 mg oral tablet: 2 tab(s) orally once a day

## 2024-03-21 LAB
ANION GAP SERPL CALC-SCNC: 12 MMOL/L — SIGNIFICANT CHANGE UP (ref 7–14)
BASE EXCESS BLDV CALC-SCNC: 11.2 MMOL/L — HIGH (ref -2–3)
BUN SERPL-MCNC: 58 MG/DL — HIGH (ref 7–23)
CALCIUM SERPL-MCNC: 9.1 MG/DL — SIGNIFICANT CHANGE UP (ref 8.4–10.5)
CHLORIDE SERPL-SCNC: 96 MMOL/L — LOW (ref 98–107)
CO2 BLDV-SCNC: 38 MMOL/L — HIGH (ref 22–26)
CO2 SERPL-SCNC: 32 MMOL/L — HIGH (ref 22–31)
CREAT SERPL-MCNC: 1.58 MG/DL — HIGH (ref 0.5–1.3)
EGFR: 49 ML/MIN/1.73M2 — LOW
GLUCOSE BLDC GLUCOMTR-MCNC: 146 MG/DL — HIGH (ref 70–99)
GLUCOSE BLDC GLUCOMTR-MCNC: 160 MG/DL — HIGH (ref 70–99)
GLUCOSE BLDC GLUCOMTR-MCNC: 162 MG/DL — HIGH (ref 70–99)
GLUCOSE BLDC GLUCOMTR-MCNC: 94 MG/DL — SIGNIFICANT CHANGE UP (ref 70–99)
GLUCOSE SERPL-MCNC: 82 MG/DL — SIGNIFICANT CHANGE UP (ref 70–99)
HCO3 BLDV-SCNC: 36 MMOL/L — HIGH (ref 22–29)
HCT VFR BLD CALC: 39.9 % — SIGNIFICANT CHANGE UP (ref 39–50)
HGB BLD-MCNC: 12.6 G/DL — LOW (ref 13–17)
MAGNESIUM SERPL-MCNC: 2.3 MG/DL — SIGNIFICANT CHANGE UP (ref 1.6–2.6)
MCHC RBC-ENTMCNC: 28 PG — SIGNIFICANT CHANGE UP (ref 27–34)
MCHC RBC-ENTMCNC: 31.6 GM/DL — LOW (ref 32–36)
MCV RBC AUTO: 88.7 FL — SIGNIFICANT CHANGE UP (ref 80–100)
NRBC # BLD: 0 /100 WBCS — SIGNIFICANT CHANGE UP (ref 0–0)
NRBC # FLD: 0 K/UL — SIGNIFICANT CHANGE UP (ref 0–0)
PCO2 BLDV: 49 MMHG — SIGNIFICANT CHANGE UP (ref 42–55)
PH BLDV: 7.48 — HIGH (ref 7.32–7.43)
PHOSPHATE SERPL-MCNC: 3.1 MG/DL — SIGNIFICANT CHANGE UP (ref 2.5–4.5)
PLATELET # BLD AUTO: 237 K/UL — SIGNIFICANT CHANGE UP (ref 150–400)
PO2 BLDV: 147 MMHG — HIGH (ref 25–45)
POTASSIUM SERPL-MCNC: 3.4 MMOL/L — LOW (ref 3.5–5.3)
POTASSIUM SERPL-SCNC: 3.4 MMOL/L — LOW (ref 3.5–5.3)
RBC # BLD: 4.5 M/UL — SIGNIFICANT CHANGE UP (ref 4.2–5.8)
RBC # FLD: 14.2 % — SIGNIFICANT CHANGE UP (ref 10.3–14.5)
SAO2 % BLDV: 99.4 % — HIGH (ref 67–88)
SARS-COV-2 RNA SPEC QL NAA+PROBE: SIGNIFICANT CHANGE UP
SODIUM SERPL-SCNC: 140 MMOL/L — SIGNIFICANT CHANGE UP (ref 135–145)
WBC # BLD: 6.13 K/UL — SIGNIFICANT CHANGE UP (ref 3.8–10.5)
WBC # FLD AUTO: 6.13 K/UL — SIGNIFICANT CHANGE UP (ref 3.8–10.5)

## 2024-03-21 RX ORDER — POTASSIUM CHLORIDE 20 MEQ
40 PACKET (EA) ORAL ONCE
Refills: 0 | Status: COMPLETED | OUTPATIENT
Start: 2024-03-21 | End: 2024-03-21

## 2024-03-21 RX ORDER — BUMETANIDE 0.25 MG/ML
2 INJECTION INTRAMUSCULAR; INTRAVENOUS DAILY
Refills: 0 | Status: DISCONTINUED | OUTPATIENT
Start: 2024-03-22 | End: 2024-03-28

## 2024-03-21 RX ADMIN — BUMETANIDE 3 MILLIGRAM(S): 0.25 INJECTION INTRAMUSCULAR; INTRAVENOUS at 05:03

## 2024-03-21 RX ADMIN — Medication 300 MILLIGRAM(S): at 05:06

## 2024-03-21 RX ADMIN — Medication 40 MILLIEQUIVALENT(S): at 14:39

## 2024-03-21 RX ADMIN — LOSARTAN POTASSIUM 25 MILLIGRAM(S): 100 TABLET, FILM COATED ORAL at 05:03

## 2024-03-21 RX ADMIN — Medication 1: at 17:57

## 2024-03-21 RX ADMIN — SPIRONOLACTONE 50 MILLIGRAM(S): 25 TABLET, FILM COATED ORAL at 05:03

## 2024-03-21 RX ADMIN — Medication 81 MILLIGRAM(S): at 17:57

## 2024-03-21 NOTE — PROGRESS NOTE ADULT - ASSESSMENT
ECHO 3/4/23: EF 33% mild MR, Severe global left ventricular systolic dysfunction  cardiac cath from 3/6/23  Coronaries with minor luminal irregularities. Elevated LVEDP.      A/P  63 yr old male with a pmh of HTN, HF rEF ( EF33%), T2DM on metformin who presents with ROSS and bilateral lower extremity edema with his legs feeling heavy.     #Acute on chronic HFrEF  -vol status improved  -change bumex to 2mg PO daily to min hypokalemia  -hold metolazone  -kcl 20meq qd on d/c   -continue aldactone 50mg qd  -Not compliant with home meds (lasix 40mg daily, metoprolol XL 50mg daily, hydralazine 25mg TID)  -repeat echo this admission with ef 27%   Left ventricular systolic function is severely decreased  -Prior cardiac cath from 3/6/23  Coronaries with minor luminal irregularities. Elevated LVEDP.   -strict I/O, monitor daily weights, fluid restriction   -Continue toprol 300mg PO daily-- prior events on tele noted   -s/p ICD  -per [employer] insurance does not cover entresto   -c/w losartan     #syncope, loc  -depressed ed, ivcd, nsvt on tele, up to 31 beats  -eps f/u  -s/p icd    #Hypertensive urgency.   -bp now controlled   -continue meds     #T2DM (type 2 diabetes mellitus).   -med f/u     #HLD  -cont statin    dvt ppx    55 minutes spent on total encounter; more than 50% of the visit was spent counseling and/or coordinating care by the attending physician.,        cv stable for d/c planning to [facility]    d/w acp

## 2024-03-21 NOTE — PROGRESS NOTE ADULT - SUBJECTIVE AND OBJECTIVE BOX
CARDIOLOGY FOLLOW UP NOTE - DR. REYES    Patient Name: JOHN PAUL ARREAGA    Date of Service: 24 @ 11:48    Patient seen and examined  events noted  back to baseline no new complaints    Subjective:    cv: denies chest pain, dyspnea, palpitations, dizziness  pulmonary: denies cough  GI: denies abdominal pain, nausea, vomiting  vascular/legs: no edema   skin: no rash  ROS: otherwise negative   overnight events:      PHYSICAL EXAM:  T(C): 36.1 (24 @ 05:00), Max: 36.7 (24 @ 18:30)  HR: 76 (24 @ 05:00) (76 - 92)  BP: 111/68 (24 @ 05:00) (111/68 - 140/90)  RR: 18 (24 @ 05:00) (16 - 18)  SpO2: 100% (24 @ 05:00) (96% - 100%)  Wt(kg): --  I&O's Summary    20 Mar 2024 07:01  -  21 Mar 2024 07:00  --------------------------------------------------------  IN: 1200 mL / OUT: 1280 mL / NET: -80 mL      Daily     Daily Weight in k.3 (21 Mar 2024 05:00)    Appearance: Normal	  Cardiovascular: Normal S1 S2,RRR, No JVD, No murmurs  Respiratory: Lungs clear to auscultation	  Gastrointestinal:  Soft, Non-tender, + BS	  Extremities: Normal range of motion, No clubbing, cyanosis or edema  ppm site c/d/i    Home Medications:  METFORMIN HCL 1,000 MG TABLET: TAKE 1 TABLET BY MOUTH TWICE A DAY WITH MEALS (02 Mar 2024 20:59)  METOPROLOL SUCC ER 50 MG TAB: TAKE 1 TABLET BY MOUTH EVERY DAY (02 Mar 2024 20:59)      MEDICATIONS  (STANDING):  aspirin enteric coated 81 milliGRAM(s) Oral daily  dextrose 5%. 1000 milliLiter(s) (100 mL/Hr) IV Continuous <Continuous>  dextrose 5%. 1000 milliLiter(s) (50 mL/Hr) IV Continuous <Continuous>  dextrose 50% Injectable 25 Gram(s) IV Push once  dextrose 50% Injectable 12.5 Gram(s) IV Push once  dextrose 50% Injectable 25 Gram(s) IV Push once  glucagon  Injectable 1 milliGRAM(s) IntraMuscular once  influenza   Vaccine 0.5 milliLiter(s) IntraMuscular once  insulin lispro (ADMELOG) corrective regimen sliding scale   SubCutaneous three times a day before meals  insulin lispro (ADMELOG) corrective regimen sliding scale   SubCutaneous at bedtime  losartan 25 milliGRAM(s) Oral daily  metoprolol succinate  milliGRAM(s) Oral daily  potassium chloride    Tablet ER 40 milliEquivalent(s) Oral once  spironolactone 50 milliGRAM(s) Oral daily      TELEMETRY: 	    ECG:  	  RADIOLOGY:   DIAGNOSTIC TESTING:  [ ] Echocardiogram:  [ ] Catheterization:  [ ] Stress Test:    OTHER: 	    LABS:	 	    CARDIAC MARKERS:                                      12.6   6.13  )-----------( 237      ( 21 Mar 2024 05:43 )             39.9         140  |  96<L>  |  58<H>  ----------------------------<  82  3.4<L>   |  32<H>  |  1.58<H>    Ca    9.1      21 Mar 2024 05:43  Phos  3.1       Mg     2.30           proBNP:     Lipid Profile:   HgA1c:     Creatinine: 1.58 mg/dL (24 @ 05:43)  Creatinine: 1.67 mg/dL (24 @ 17:57)  Creatinine: 1.36 mg/dL (24 @ 06:55)

## 2024-03-21 NOTE — PROGRESS NOTE ADULT - ASSESSMENT
63y Male with history of CHF presents with SOB. Nephrology consulted for elevated Scr.    1. CKD-3a: Giovanni Scr 1-1.2 however likely dilutional in setting of volume overload. Suspect current values represent true baseline. UA bland with mild proteinuria (spot urine TP/CR 0.3). Bladder scan negative. Avoid nephrotoxins. Monitor electrolytes.    2. Hypokalemia: Mild and repleted. Defer increasing aldactone to prevent hypovolemia. Will start standing KCL 20 meQ daily on discharge. Monitor serum potassium.    3. Metabolic alkalosis: Improving as per blood gas. Monitor pH.    4. HTN with CKD: BP controlled. Continue with current medications. Monitor BP.    5. Acute on chronic systolic HF: Improving. After discussion with cardiology, will decrease bumex to 2 mg PO daily. TTE with severely decreased LVSF. Monitor [facility] NEPHROLOGY  Adi Diaz M.D.  Nj Abarca D.O.  Kezia Dunn M.D.  MD Ute Humphrey, MSN, ANP-C    Telephone: [phone]  Facsimile: [phone] [place] [address], [address]  [place] [address]

## 2024-03-21 NOTE — PROGRESS NOTE ADULT - SUBJECTIVE AND OBJECTIVE BOX
Patient is a 63y old  Male who presents with a chief complaint of Acute decompensated heart failure (21 Mar 2024 11:47)      INTERVAL HPI/OVERNIGHT EVENTS: seen and examined, NAD   T(C): 36.7 (03-21-24 @ 17:00), Max: 36.9 (03-21-24 @ 13:00)  HR: 78 (03-21-24 @ 17:00) (76 - 92)  BP: 124/83 (03-21-24 @ 17:00) (111/68 - 128/64)  RR: 16 (03-21-24 @ 17:00) (16 - 18)  SpO2: 100% (03-21-24 @ 17:00) (96% - 100%)  Wt(kg): --  I&O's Summary    20 Mar 2024 07:01  -  21 Mar 2024 07:00  --------------------------------------------------------  IN: 1200 mL / OUT: 1280 mL / NET: -80 mL    21 Mar 2024 07:01  -  21 Mar 2024 20:57  --------------------------------------------------------  IN: 0 mL / OUT: 1660 mL / NET: -1660 mL        PAST MEDICAL & SURGICAL HISTORY:  HTN (hypertension)      HLD (hyperlipidemia)      DM (diabetes mellitus)      HFrEF (heart failure with reduced ejection fraction)      No significant past surgical history          SOCIAL HISTORY  Alcohol:  Tobacco:  Illicit substance use:    FAMILY HISTORY:    REVIEW OF SYSTEMS:  CONSTITUTIONAL: No fever, weight loss, or fatigue  EYES: No eye pain, visual disturbances, or discharge  ENMT:  No difficulty hearing, tinnitus, vertigo; No sinus or throat pain  NECK: No pain or stiffness  RESPIRATORY: No cough, wheezing, chills or hemoptysis; No shortness of breath  CARDIOVASCULAR: No chest pain, palpitations, dizziness, or leg swelling  GASTROINTESTINAL: No abdominal or epigastric pain. No nausea, vomiting, or hematemesis; No diarrhea or constipation. No melena or hematochezia.  GENITOURINARY: No dysuria, frequency, hematuria, or incontinence  NEUROLOGICAL: No headaches, memory loss, loss of strength, numbness, or tremors  SKIN: No itching, burning, rashes, or lesions   LYMPH NODES: No enlarged glands  ENDOCRINE: No heat or cold intolerance; No hair loss  MUSCULOSKELETAL: No joint pain or swelling; No muscle, back, or extremity pain  PSYCHIATRIC: No depression, anxiety, mood swings, or difficulty sleeping  HEME/LYMPH: No easy bruising, or bleeding gums  ALLERY AND IMMUNOLOGIC: No hives or eczema    RADIOLOGY & ADDITIONAL TESTS:    Imaging Personally Reviewed:  [ ] YES  [ ] NO    Consultant(s) Notes Reviewed:  [ ] YES  [ ] NO    PHYSICAL EXAM:  GENERAL: NAD, well-groomed, well-developed  HEAD:  Atraumatic, Normocephalic  EYES: EOMI, PERRLA, conjunctiva and sclera clear  ENMT: No tonsillar erythema, exudates, or enlargement; Moist mucous membranes, Good dentition, No lesions  NECK: Supple, No JVD, Normal thyroid  NERVOUS SYSTEM:  Alert & Oriented X3, Good concentration; Motor Strength 5/5 B/L upper and lower extremities; DTRs 2+ intact and symmetric  CHEST/LUNG: Clear to percussion bilaterally; No rales, rhonchi, wheezing, or rubs  HEART: Regular rate and rhythm; No murmurs, rubs, or gallops  ABDOMEN: Soft, Nontender, Nondistended; Bowel sounds present  EXTREMITIES:  2+ Peripheral Pulses, No clubbing, cyanosis, or edema  LYMPH: No lymphadenopathy noted  SKIN: No rashes or lesions    LABS:                        12.6   6.13  )-----------( 237      ( 21 Mar 2024 05:43 )             39.9     03-21    140  |  96<L>  |  58<H>  ----------------------------<  82  3.4<L>   |  32<H>  |  1.58<H>    Ca    9.1      21 Mar 2024 05:43  Phos  3.1     03-21  Mg     2.30     03-21        Urinalysis Basic - ( 21 Mar 2024 05:43 )    Color: x / Appearance: x / SG: x / pH: x  Gluc: 82 mg/dL / Ketone: x  / Bili: x / Urobili: x   Blood: x / Protein: x / Nitrite: x   Leuk Esterase: x / RBC: x / WBC x   Sq Epi: x / Non Sq Epi: x / Bacteria: x      CAPILLARY BLOOD GLUCOSE      POCT Blood Glucose.: 162 mg/dL (21 Mar 2024 17:15)  POCT Blood Glucose.: 146 mg/dL (21 Mar 2024 12:17)  POCT Blood Glucose.: 94 mg/dL (21 Mar 2024 08:44)    ABG - ( 20 Mar 2024 18:20 )  pH, Arterial: 7.49  pH, Blood: x     /  pCO2: 52    /  pO2: 81    / HCO3: 40    / Base Excess: 14.0  /  SaO2: 96.8                Urinalysis Basic - ( 21 Mar 2024 05:43 )    Color: x / Appearance: x / SG: x / pH: x  Gluc: 82 mg/dL / Ketone: x  / Bili: x / Urobili: x   Blood: x / Protein: x / Nitrite: x   Leuk Esterase: x / RBC: x / WBC x   Sq Epi: x / Non Sq Epi: x / Bacteria: x        MEDICATIONS  (STANDING):  aspirin enteric coated 81 milliGRAM(s) Oral daily  dextrose 5%. 1000 milliLiter(s) (100 mL/Hr) IV Continuous <Continuous>  dextrose 5%. 1000 milliLiter(s) (50 mL/Hr) IV Continuous <Continuous>  dextrose 50% Injectable 25 Gram(s) IV Push once  dextrose 50% Injectable 12.5 Gram(s) IV Push once  dextrose 50% Injectable 25 Gram(s) IV Push once  glucagon  Injectable 1 milliGRAM(s) IntraMuscular once  influenza   Vaccine 0.5 milliLiter(s) IntraMuscular once  insulin lispro (ADMELOG) corrective regimen sliding scale   SubCutaneous three times a day before meals  insulin lispro (ADMELOG) corrective regimen sliding scale   SubCutaneous at bedtime  losartan 25 milliGRAM(s) Oral daily  metoprolol succinate  milliGRAM(s) Oral daily  spironolactone 50 milliGRAM(s) Oral daily    MEDICATIONS  (PRN):  acetaminophen     Tablet .. 650 milliGRAM(s) Oral every 6 hours PRN Temp greater or equal to 38C (100.4F), Mild Pain (1 - 3)  aluminum hydroxide/magnesium hydroxide/simethicone Suspension 30 milliLiter(s) Oral every 4 hours PRN Dyspepsia  dextrose Oral Gel 15 Gram(s) Oral once PRN Blood Glucose LESS THAN 70 milliGRAM(s)/deciliter  melatonin 3 milliGRAM(s) Oral at bedtime PRN Insomnia  ondansetron Injectable 4 milliGRAM(s) IV Push every 8 hours PRN Nausea and/or Vomiting      Care Discussed with Consultants/Other Providers [ ] YES  [ ] NO

## 2024-03-21 NOTE — PROGRESS NOTE ADULT - ASSESSMENT
63 yr old male with a pmh of HTN, HF rEF ( EF33%), T2DM on metformin who presents with ROSS and bilateral lower extremity edema with his legs feeling heavy.     #Acute on chronic systolic heart Failure : due to non complaince with meds : d/w pt and niece need to adhere with meds and need for AICD after re-evaluation of compliance   cont diuresis   now bumex changed to 2 mg daily from today   off metolazone       # DONNA :   seen by renal   bumex dose adjusted     #syncope  #NSVT on tele   on BBlocker   seen by EP   s/p AICD implant    #Hypertensive   controlled now     #DM-2   a1c 7.1  c/w Insulin / FSBS    #HLD  -cont statin    # non compliance with meds :  counseling done     dispo: doing fair , seen by PT recommend STR, ok to be d/c to rehab

## 2024-03-21 NOTE — PROGRESS NOTE ADULT - SUBJECTIVE AND OBJECTIVE BOX
[facility] NEPHROLOGY- PROGRESS NOTE    63y Male with history of CHF presents with SOB. Nephrology consulted for elevated Scr.    REVIEW OF SYSTEMS:  Gen: no fevers  Cards: no chest pain  Resp: no dyspnea  GI: no nausea or vomiting or diarrhea  Vascular: + LE edema improving    No Known Allergies      Hospital Medications: Medications reviewed        VITALS:  T(F): 97 (03-21-24 @ 05:00), Max: 98 (03-20-24 @ 18:30)  HR: 76 (03-21-24 @ 05:00)  BP: 111/68 (03-21-24 @ 05:00)  RR: 18 (03-21-24 @ 05:00)  SpO2: 100% (03-21-24 @ 05:00)  Wt(kg): --    03-20 @ 07:01  -  03-21 @ 07:00  --------------------------------------------------------  IN: 1200 mL / OUT: 1280 mL / NET: -80 mL        PHYSICAL EXAM:    Gen: NAD, calm  Cards: RRR, +S1/S2, no M/G/R  Resp: CTA B/L  GI: soft, NT/ND, NABS  Vascular: no LE edema B/L with skin wrinkling      LABS:  03-21    140  |  96<L>  |  58<H>  ----------------------------<  82  3.4<L>   |  32<H>  |  1.58<H>    Ca    9.1      21 Mar 2024 05:43  Phos  3.1     03-21  Mg     2.30     03-21      Creatinine Trend: 1.58 <--, 1.67 <--, 1.36 <--, 1.44 <--, 1.54 <--, 1.42 <--, 1.41 <--                        12.6   6.13  )-----------( 237      ( 21 Mar 2024 05:43 )             39.9     Urine Studies:  Urinalysis Basic - ( 21 Mar 2024 05:43 )    Color:  / Appearance:  / SG:  / pH:   Gluc: 82 mg/dL / Ketone:   / Bili:  / Urobili:    Blood:  / Protein:  / Nitrite:    Leuk Esterase:  / RBC:  / WBC    Sq Epi:  / Non Sq Epi:  / Bacteria:       Creatinine, Random Urine: 27 mg/dL (03-19 @ 01:00)  Protein/Creatinine Ratio Calculation: 0.3 Ratio (03-19 @ 01:00)

## 2024-03-22 LAB
ANION GAP SERPL CALC-SCNC: 9 MMOL/L — SIGNIFICANT CHANGE UP (ref 7–14)
BUN SERPL-MCNC: 54 MG/DL — HIGH (ref 7–23)
CALCIUM SERPL-MCNC: 8.7 MG/DL — SIGNIFICANT CHANGE UP (ref 8.4–10.5)
CHLORIDE SERPL-SCNC: 98 MMOL/L — SIGNIFICANT CHANGE UP (ref 98–107)
CO2 SERPL-SCNC: 31 MMOL/L — SIGNIFICANT CHANGE UP (ref 22–31)
CREAT SERPL-MCNC: 1.37 MG/DL — HIGH (ref 0.5–1.3)
EGFR: 58 ML/MIN/1.73M2 — LOW
GLUCOSE BLDC GLUCOMTR-MCNC: 144 MG/DL — HIGH (ref 70–99)
GLUCOSE BLDC GLUCOMTR-MCNC: 169 MG/DL — HIGH (ref 70–99)
GLUCOSE BLDC GLUCOMTR-MCNC: 185 MG/DL — HIGH (ref 70–99)
GLUCOSE BLDC GLUCOMTR-MCNC: 196 MG/DL — HIGH (ref 70–99)
GLUCOSE SERPL-MCNC: 109 MG/DL — HIGH (ref 70–99)
HCT VFR BLD CALC: 37.7 % — LOW (ref 39–50)
HGB BLD-MCNC: 11.9 G/DL — LOW (ref 13–17)
MAGNESIUM SERPL-MCNC: 2.3 MG/DL — SIGNIFICANT CHANGE UP (ref 1.6–2.6)
MCHC RBC-ENTMCNC: 28 PG — SIGNIFICANT CHANGE UP (ref 27–34)
MCHC RBC-ENTMCNC: 31.6 GM/DL — LOW (ref 32–36)
MCV RBC AUTO: 88.7 FL — SIGNIFICANT CHANGE UP (ref 80–100)
NRBC # BLD: 0 /100 WBCS — SIGNIFICANT CHANGE UP (ref 0–0)
NRBC # FLD: 0 K/UL — SIGNIFICANT CHANGE UP (ref 0–0)
PHOSPHATE SERPL-MCNC: 2.6 MG/DL — SIGNIFICANT CHANGE UP (ref 2.5–4.5)
PLATELET # BLD AUTO: 232 K/UL — SIGNIFICANT CHANGE UP (ref 150–400)
POTASSIUM SERPL-MCNC: 3.8 MMOL/L — SIGNIFICANT CHANGE UP (ref 3.5–5.3)
POTASSIUM SERPL-SCNC: 3.8 MMOL/L — SIGNIFICANT CHANGE UP (ref 3.5–5.3)
RBC # BLD: 4.25 M/UL — SIGNIFICANT CHANGE UP (ref 4.2–5.8)
RBC # FLD: 14 % — SIGNIFICANT CHANGE UP (ref 10.3–14.5)
SODIUM SERPL-SCNC: 138 MMOL/L — SIGNIFICANT CHANGE UP (ref 135–145)
WBC # BLD: 6.02 K/UL — SIGNIFICANT CHANGE UP (ref 3.8–10.5)
WBC # FLD AUTO: 6.02 K/UL — SIGNIFICANT CHANGE UP (ref 3.8–10.5)

## 2024-03-22 RX ORDER — SENNA PLUS 8.6 MG/1
2 TABLET ORAL AT BEDTIME
Refills: 0 | Status: DISCONTINUED | OUTPATIENT
Start: 2024-03-22 | End: 2024-03-28

## 2024-03-22 RX ORDER — POTASSIUM CHLORIDE 20 MEQ
20 PACKET (EA) ORAL ONCE
Refills: 0 | Status: COMPLETED | OUTPATIENT
Start: 2024-03-22 | End: 2024-03-22

## 2024-03-22 RX ORDER — POLYETHYLENE GLYCOL 3350 17 G/17G
17 POWDER, FOR SOLUTION ORAL DAILY
Refills: 0 | Status: DISCONTINUED | OUTPATIENT
Start: 2024-03-22 | End: 2024-03-28

## 2024-03-22 RX ADMIN — SENNA PLUS 2 TABLET(S): 8.6 TABLET ORAL at 20:51

## 2024-03-22 RX ADMIN — Medication 1: at 09:21

## 2024-03-22 RX ADMIN — BUMETANIDE 2 MILLIGRAM(S): 0.25 INJECTION INTRAMUSCULAR; INTRAVENOUS at 05:43

## 2024-03-22 RX ADMIN — SPIRONOLACTONE 50 MILLIGRAM(S): 25 TABLET, FILM COATED ORAL at 05:42

## 2024-03-22 RX ADMIN — LOSARTAN POTASSIUM 25 MILLIGRAM(S): 100 TABLET, FILM COATED ORAL at 05:42

## 2024-03-22 RX ADMIN — Medication 1: at 17:50

## 2024-03-22 RX ADMIN — Medication 20 MILLIEQUIVALENT(S): at 14:22

## 2024-03-22 RX ADMIN — Medication 81 MILLIGRAM(S): at 14:20

## 2024-03-22 RX ADMIN — Medication 300 MILLIGRAM(S): at 05:42

## 2024-03-22 NOTE — CHART NOTE - NSCHARTNOTEFT_GEN_A_CORE
Source: Patient [x ]    Family [ ]     other [x ] Chart review    Current Diet : Diet, Regular:   Consistent Carbohydrate {Evening Snack} (CSTCHOSN)  DASH/TLC {Sodium & Cholesterol Restricted} (DASH)  1000mL Fluid Restriction ([id number]) (03-02-24 @ 19:12) [Active]    PO intake:  % [x ]   Height (cm): 175.3 (02 Mar 2024 22:40)  Weight (kg): 75.1kg (3/22), 77kg (3/19), 107.9kg (3/11), 116.2kg (3/6), 112.4 (02 Mar 2024 22:40)  BMI (kg/m2): 24.4 (3/22)    Nutrition Note:  63 yr old male with a pmh of HTN, HF rEF ( EF33%), T2DM on metformin who presents with ROSS and bilateral lower extremity edema with his legs feeling heavy, per chart.     Patient is seen for nutrition follow-up. Patient reports good appetite, consuming >/=75% of meals. Patient denies any difficulty chewing or swallowing, any GI distress (N/V/D/C) during visit. Reports last bowel movement 'a few days ago'. Consider bowel regimen. Current weight: 75.1kg/165.5lbs (3/22, per RN flow sheet).Noted patient has significant weight loss of -37.3kg/-33%BW x 20days (since admission).  Patient states that weight loss is due to fluid loss. Per RN flow sheet, patient has 1+generalized edema. Noted patient is on spironolactone, continue to monitor weight trend. Last HgA1c- 7.1% (3/3). RD provided the patient with extensive verbal and written DM diet education; including, carb counting, label reading, meal planning. Also discussed Heart Healthy diet recommendations. Importance of having consistent carbohydrate with protein at each meals emphasized. Patient is receptive to the information provided.     __________________ Pertinent Medications__________________   MEDICATIONS  (STANDING):  aspirin enteric coated 81 milliGRAM(s) Oral daily  buMETAnide 2 milliGRAM(s) Oral daily  dextrose 5%. 1000 milliLiter(s) (50 mL/Hr) IV Continuous <Continuous>  dextrose 5%. 1000 milliLiter(s) (100 mL/Hr) IV Continuous <Continuous>  dextrose 50% Injectable 25 Gram(s) IV Push once  dextrose 50% Injectable 12.5 Gram(s) IV Push once  dextrose 50% Injectable 25 Gram(s) IV Push once  glucagon  Injectable 1 milliGRAM(s) IntraMuscular once  influenza   Vaccine 0.5 milliLiter(s) IntraMuscular once  insulin lispro (ADMELOG) corrective regimen sliding scale   SubCutaneous three times a day before meals  insulin lispro (ADMELOG) corrective regimen sliding scale   SubCutaneous at bedtime  losartan 25 milliGRAM(s) Oral daily  metoprolol succinate  milliGRAM(s) Oral daily  spironolactone 50 milliGRAM(s) Oral daily    MEDICATIONS  (PRN):  acetaminophen     Tablet .. 650 milliGRAM(s) Oral every 6 hours PRN Temp greater or equal to 38C (100.4F), Mild Pain (1 - 3)  aluminum hydroxide/magnesium hydroxide/simethicone Suspension 30 milliLiter(s) Oral every 4 hours PRN Dyspepsia  dextrose Oral Gel 15 Gram(s) Oral once PRN Blood Glucose LESS THAN 70 milliGRAM(s)/deciliter  melatonin 3 milliGRAM(s) Oral at bedtime PRN Insomnia  ondansetron Injectable 4 milliGRAM(s) IV Push every 8 hours PRN Nausea and/or Vomiting      __________________ Pertinent Labs__________________   03-22 Na138 mmol/L Glu 109 mg/dL<H> K+ 3.8 mmol/L Cr  1.37 mg/dL<H> BUN 54 mg/dL<H> 03-22 Phos 2.6 mg/dL 03-03 Chol 111 mg/dL LDL --    HDL 40 mg/dL<L> Trig 63 mg/dL        Skin: As per RN flow sheet, no pressure injury noted at this time.     Estimated Needs:   [x ] no change since previous assessment    Previous Nutrition Diagnosis:   [x ] Limited adherence to nutrition related recommendations  Nutrition Diagnosis is [x ] ongoing   [x ] Overweight/obesity   Nutrition Diagnosis is [x ] not applicable   New Nutrition Diagnosis: [ x] not applicable      Interventions:   Recommend  [x ] Continue with current diet. Fluid needs per MD discretion.   [x ] Consider adding bowel regimen.   [x ] Encourage PO intake and honor food preferences as able.      Monitoring and Evaluation:   [ x] PO intake [x ] Tolerance to diet prescription [x ] weights [x ] follow up per protocol  [ x] other: bowel movement, skin integrity, labs.

## 2024-03-22 NOTE — PROGRESS NOTE ADULT - SUBJECTIVE AND OBJECTIVE BOX
Patient is a 63y old  Male who presents with a chief complaint of Acute decompensated heart failure (22 Mar 2024 13:54)      INTERVAL HPI/OVERNIGHT EVENTS: doing fair , denies any SOB or CP  T(C): 36.4 (03-22-24 @ 19:55), Max: 37.1 (03-22-24 @ 09:40)  HR: 88 (03-22-24 @ 09:40) (78 - 88)  BP: 106/67 (03-22-24 @ 19:55) (105/55 - 124/78)  RR: 18 (03-22-24 @ 19:55) (18 - 18)  SpO2: 99% (03-22-24 @ 19:55) (99% - 100%)  Wt(kg): --  I&O's Summary    21 Mar 2024 07:01  -  22 Mar 2024 07:00  --------------------------------------------------------  IN: 0 mL / OUT: 2960 mL / NET: -2960 mL    22 Mar 2024 07:01  -  22 Mar 2024 23:50  --------------------------------------------------------  IN: 240 mL / OUT: 2500 mL / NET: -2260 mL        PAST MEDICAL & SURGICAL HISTORY:  HTN (hypertension)      HLD (hyperlipidemia)      DM (diabetes mellitus)      HFrEF (heart failure with reduced ejection fraction)      No significant past surgical history          SOCIAL HISTORY  Alcohol:  Tobacco:  Illicit substance use:    FAMILY HISTORY:    REVIEW OF SYSTEMS:  CONSTITUTIONAL: No fever, weight loss, or fatigue  EYES: No eye pain, visual disturbances, or discharge  ENMT:  No difficulty hearing, tinnitus, vertigo; No sinus or throat pain  NECK: No pain or stiffness  RESPIRATORY: No cough, wheezing, chills or hemoptysis; No shortness of breath  CARDIOVASCULAR: No chest pain, palpitations, dizziness, or leg swelling  GASTROINTESTINAL: No abdominal or epigastric pain. No nausea, vomiting, or hematemesis; No diarrhea or constipation. No melena or hematochezia.  GENITOURINARY: No dysuria, frequency, hematuria, or incontinence  NEUROLOGICAL: No headaches, memory loss, loss of strength, numbness, or tremors  SKIN: No itching, burning, rashes, or lesions   LYMPH NODES: No enlarged glands  ENDOCRINE: No heat or cold intolerance; No hair loss  MUSCULOSKELETAL: No joint pain or swelling; No muscle, back, or extremity pain  PSYCHIATRIC: No depression, anxiety, mood swings, or difficulty sleeping  HEME/LYMPH: No easy bruising, or bleeding gums  ALLERY AND IMMUNOLOGIC: No hives or eczema    RADIOLOGY & ADDITIONAL TESTS:    Imaging Personally Reviewed:  [ ] YES  [ ] NO    Consultant(s) Notes Reviewed:  [ ] YES  [ ] NO    PHYSICAL EXAM:  GENERAL: NAD, well-groomed, well-developed  HEAD:  Atraumatic, Normocephalic  EYES: EOMI, PERRLA, conjunctiva and sclera clear  ENMT: No tonsillar erythema, exudates, or enlargement; Moist mucous membranes, Good dentition, No lesions  NECK: Supple, No JVD, Normal thyroid  NERVOUS SYSTEM:  Alert & Oriented X3, Good concentration; Motor Strength 5/5 B/L upper and lower extremities; DTRs 2+ intact and symmetric  CHEST/LUNG: Clear to percussion bilaterally; No rales, rhonchi, wheezing, or rubs  HEART: Regular rate and rhythm; No murmurs, rubs, or gallops  ABDOMEN: Soft, Nontender, Nondistended; Bowel sounds present  EXTREMITIES:  2+ Peripheral Pulses, No clubbing, cyanosis, or edema  LYMPH: No lymphadenopathy noted  SKIN: No rashes or lesions    LABS:                        11.9   6.02  )-----------( 232      ( 22 Mar 2024 06:07 )             37.7     03-22    138  |  98  |  54<H>  ----------------------------<  109<H>  3.8   |  31  |  1.37<H>    Ca    8.7      22 Mar 2024 06:07  Phos  2.6     03-22  Mg     2.30     03-22        Urinalysis Basic - ( 22 Mar 2024 06:07 )    Color: x / Appearance: x / SG: x / pH: x  Gluc: 109 mg/dL / Ketone: x  / Bili: x / Urobili: x   Blood: x / Protein: x / Nitrite: x   Leuk Esterase: x / RBC: x / WBC x   Sq Epi: x / Non Sq Epi: x / Bacteria: x      CAPILLARY BLOOD GLUCOSE      POCT Blood Glucose.: 185 mg/dL (22 Mar 2024 22:00)  POCT Blood Glucose.: 169 mg/dL (22 Mar 2024 17:27)  POCT Blood Glucose.: 144 mg/dL (22 Mar 2024 12:24)  POCT Blood Glucose.: 196 mg/dL (22 Mar 2024 09:14)        Urinalysis Basic - ( 22 Mar 2024 06:07 )    Color: x / Appearance: x / SG: x / pH: x  Gluc: 109 mg/dL / Ketone: x  / Bili: x / Urobili: x   Blood: x / Protein: x / Nitrite: x   Leuk Esterase: x / RBC: x / WBC x   Sq Epi: x / Non Sq Epi: x / Bacteria: x        MEDICATIONS  (STANDING):  aspirin enteric coated 81 milliGRAM(s) Oral daily  buMETAnide 2 milliGRAM(s) Oral daily  dextrose 5%. 1000 milliLiter(s) (50 mL/Hr) IV Continuous <Continuous>  dextrose 5%. 1000 milliLiter(s) (100 mL/Hr) IV Continuous <Continuous>  dextrose 50% Injectable 25 Gram(s) IV Push once  dextrose 50% Injectable 12.5 Gram(s) IV Push once  dextrose 50% Injectable 25 Gram(s) IV Push once  glucagon  Injectable 1 milliGRAM(s) IntraMuscular once  influenza   Vaccine 0.5 milliLiter(s) IntraMuscular once  insulin lispro (ADMELOG) corrective regimen sliding scale   SubCutaneous three times a day before meals  insulin lispro (ADMELOG) corrective regimen sliding scale   SubCutaneous at bedtime  losartan 25 milliGRAM(s) Oral daily  metoprolol succinate  milliGRAM(s) Oral daily  polyethylene glycol 3350 17 Gram(s) Oral daily  senna 2 Tablet(s) Oral at bedtime  spironolactone 50 milliGRAM(s) Oral daily    MEDICATIONS  (PRN):  acetaminophen     Tablet .. 650 milliGRAM(s) Oral every 6 hours PRN Temp greater or equal to 38C (100.4F), Mild Pain (1 - 3)  aluminum hydroxide/magnesium hydroxide/simethicone Suspension 30 milliLiter(s) Oral every 4 hours PRN Dyspepsia  dextrose Oral Gel 15 Gram(s) Oral once PRN Blood Glucose LESS THAN 70 milliGRAM(s)/deciliter  melatonin 3 milliGRAM(s) Oral at bedtime PRN Insomnia  ondansetron Injectable 4 milliGRAM(s) IV Push every 8 hours PRN Nausea and/or Vomiting      Care Discussed with Consultants/Other Providers [ ] YES  [ ] NO

## 2024-03-22 NOTE — PROGRESS NOTE ADULT - ASSESSMENT
63y Male with history of CHF presents with SOB. Nephrology consulted for elevated Scr.    1. CKD-3a: Giovanni Scr 1-1.2 however likely dilutional in setting of volume overload. Suspect current values represent true baseline. UA bland with mild proteinuria (spot urine TP/CR 0.3). Bladder scan negative. Avoid nephrotoxins. Monitor electrolytes.    2. Hypokalemia: Resolved. Will start standing KCL 20 meQ daily on discharge. Monitor serum potassium.    3. Metabolic alkalosis: Improving as per blood gas. Monitor pH.    4. HTN with CKD: BP controlled. Continue with current medications. Monitor BP.    5. Acute on chronic systolic HF: Improved. Continue with bumex 2 mg PO daily. TTE with severely decreased LVSF. Monitor .      [facility] NEPHROLOGY  Adi Diaz M.D.  Nj Abarca D.O.  Kezia Dunn M.D.  MD Ute Humphrey, MSN, ANP-C    Telephone: [phone]  Facsimile: [phone] [place] [address], [address]  [place] [address]

## 2024-03-22 NOTE — PROGRESS NOTE ADULT - SUBJECTIVE AND OBJECTIVE BOX
CARDIOLOGY FOLLOW UP - Dr. Zavala  Date of Service: 3/22/2024  CC: no events    Review of Systems:  Constitutional: No fever, weight loss, or fatigue  Respiratory: No cough, wheezing, or hemoptysis, no shortness of breath  Cardiovascular: No chest pain, palpitations, passing out, dizziness, or leg swelling  Gastrointestinal: No abd or epigastric pain. No nausea, vomiting, or hematemesis; no diarrhea or consiptaiton, no melena or hematochezia  Vascular: No edema     TELEMETRY:    PHYSICAL EXAM:  T(C): 37.1 (03-22-24 @ 09:40), Max: 37.1 (03-22-24 @ 09:40)  HR: 88 (03-22-24 @ 09:40) (78 - 88)  BP: 105/55 (03-22-24 @ 09:40) (105/55 - 128/75)  RR: 18 (03-22-24 @ 09:40) (16 - 18)  SpO2: 100% (03-22-24 @ 09:40) (98% - 100%)  Wt(kg): --  I&O's Summary    21 Mar 2024 07:01  -  22 Mar 2024 07:00  --------------------------------------------------------  IN: 0 mL / OUT: 2960 mL / NET: -2960 mL    22 Mar 2024 07:01  -  22 Mar 2024 11:39  --------------------------------------------------------  IN: 0 mL / OUT: 1100 mL / NET: -1100 mL        Appearance: Normal	  Cardiovascular: Normal S1 S2,RRR, No JVD, No murmurs  Respiratory: Lungs clear to auscultation	  Gastrointestinal:  Soft, Non-tender, + BS	  Extremities: Normal range of motion, No clubbing, cyanosis or edema  Vascular: Peripheral pulses palpable 2+ bilaterally       Home Medications:  METFORMIN HCL 1,000 MG TABLET: TAKE 1 TABLET BY MOUTH TWICE A DAY WITH MEALS (02 Mar 2024 20:59)  METOPROLOL SUCC ER 50 MG TAB: TAKE 1 TABLET BY MOUTH EVERY DAY (02 Mar 2024 20:59)        MEDICATIONS  (STANDING):  aspirin enteric coated 81 milliGRAM(s) Oral daily  buMETAnide 2 milliGRAM(s) Oral daily  dextrose 5%. 1000 milliLiter(s) (100 mL/Hr) IV Continuous <Continuous>  dextrose 5%. 1000 milliLiter(s) (50 mL/Hr) IV Continuous <Continuous>  dextrose 50% Injectable 25 Gram(s) IV Push once  dextrose 50% Injectable 12.5 Gram(s) IV Push once  dextrose 50% Injectable 25 Gram(s) IV Push once  glucagon  Injectable 1 milliGRAM(s) IntraMuscular once  influenza   Vaccine 0.5 milliLiter(s) IntraMuscular once  insulin lispro (ADMELOG) corrective regimen sliding scale   SubCutaneous three times a day before meals  insulin lispro (ADMELOG) corrective regimen sliding scale   SubCutaneous at bedtime  losartan 25 milliGRAM(s) Oral daily  metoprolol succinate  milliGRAM(s) Oral daily  spironolactone 50 milliGRAM(s) Oral daily        EKG:  RADIOLOGY:  DIAGNOSTIC TESTING:  [ ] Echocardiogram:  [ ] Catherterization:  [ ] Stress Test:  OTHER:     LABS:	 	                          11.9   6.02  )-----------( 232      ( 22 Mar 2024 06:07 )             37.7     03-22    138  |  98  |  54<H>  ----------------------------<  109<H>  3.8   |  31  |  1.37<H>    Ca    8.7      22 Mar 2024 06:07  Phos  2.6     03-22  Mg     2.30     03-22            CARDIAC MARKERS:

## 2024-03-22 NOTE — PROGRESS NOTE ADULT - ASSESSMENT
ECHO 3/4/23: EF 33% mild MR, Severe global left ventricular systolic dysfunction  cardiac cath from 3/6/23  Coronaries with minor luminal irregularities. Elevated LVEDP.      A/P  63 yr old male with a pmh of HTN, HF rEF ( EF33%), T2DM on metformin who presents with ROSS and bilateral lower extremity edema with his legs feeling heavy.     #Acute on chronic HFrEF  -vol status improved  -cont bumex 2mg PO daily to min hypokalemia  -hold metolazone  -kcl 20meq qd on d/c   -continue aldactone 50mg qd  -Not compliant with home meds (lasix 40mg daily, metoprolol XL 50mg daily, hydralazine 25mg TID)  -repeat echo this admission with ef 27%   Left ventricular systolic function is severely decreased  -Prior cardiac cath from 3/6/23  Coronaries with minor luminal irregularities. Elevated LVEDP.   -strict I/O, monitor daily weights, fluid restriction   -Continue toprol 300mg PO daily-- prior events on tele noted   -s/p ICD  -per [employer] insurance does not cover entresto   -c/w losartan     #syncope, loc  -depressed ed, ivcd, nsvt on tele, up to 31 beats  -eps f/u  -s/p icd    #Hypertensive urgency.   -bp now controlled   -continue meds     #T2DM (type 2 diabetes mellitus).   -med f/u     #HLD  -cont statin    dvt ppx    55 minutes spent on total encounter; more than 50% of the visit was spent counseling and/or coordinating care by the attending physician.,        cv stable for d/c planning to [facility]    35 minutes spent on total encounter; more than 50% of the visit was spent counseling and/or coordinating care by the attending physician.

## 2024-03-22 NOTE — PROGRESS NOTE ADULT - ASSESSMENT
63 yr old male with a pmh of HTN, HF rEF ( EF33%), T2DM on metformin who presents with ROSS and bilateral lower extremity edema with his legs feeling heavy.     #Acute on chronic systolic heart Failure : due to non complaince with meds : d/w pt and niece need to adhere with meds and need for AICD after re-evaluation of compliance   cont diuresis   now bumex changed to 2 mg daily from today   off metolazone     # DONNA :   seen by renal   bumex dose adjusted     #syncope  #NSVT on tele   on BBlocker   seen by EP   s/p AICD implant    #Hypertensive   controlled now     #DM-2   a1c 7.1  c/w Insulin / FSBS    #HLD  -cont statin    # non compliance with meds :  counseling done     dispo: doing fair , seen by PT recommend STR, ok to be d/c to rehab pending insurance approval

## 2024-03-22 NOTE — PROGRESS NOTE ADULT - SUBJECTIVE AND OBJECTIVE BOX
[facility] NEPHROLOGY- PROGRESS NOTE    63y Male with history of CHF presents with SOB. Nephrology consulted for elevated Scr.    REVIEW OF SYSTEMS:  Gen: no fevers  Cards: no chest pain  Resp: no dyspnea  GI: no nausea or vomiting or diarrhea  Vascular: + LE edema improving    No Known Allergies      Hospital Medications: Medications reviewed        VITALS:  T(F): 98.8 (03-22-24 @ 09:40), Max: 98.8 (03-22-24 @ 09:40)  HR: 88 (03-22-24 @ 09:40)  BP: 105/55 (03-22-24 @ 09:40)  RR: 18 (03-22-24 @ 09:40)  SpO2: 100% (03-22-24 @ 09:40)  Wt(kg): --    03-21 @ 07:01  -  03-22 @ 07:00  --------------------------------------------------------  IN: 0 mL / OUT: 2960 mL / NET: -2960 mL    03-22 @ 07:01  -  03-22 @ 13:54  --------------------------------------------------------  IN: 0 mL / OUT: 1100 mL / NET: -1100 mL        PHYSICAL EXAM:    Gen: NAD, calm  Cards: RRR, +S1/S2, no M/G/R  Resp: CTA B/L  GI: soft, NT/ND, NABS  Vascular: no LE edema B/L with skin wrinkling      LABS:  03-22    138  |  98  |  54<H>  ----------------------------<  109<H>  3.8   |  31  |  1.37<H>    Ca    8.7      22 Mar 2024 06:07  Phos  2.6     03-22  Mg     2.30     03-22      Creatinine Trend: 1.37 <--, 1.58 <--, 1.67 <--, 1.36 <--, 1.44 <--, 1.54 <--, 1.42 <--                        11.9   6.02  )-----------( 232      ( 22 Mar 2024 06:07 )             37.7     Urine Studies:  Urinalysis Basic - ( 22 Mar 2024 06:07 )    Color:  / Appearance:  / SG:  / pH:   Gluc: 109 mg/dL / Ketone:   / Bili:  / Urobili:    Blood:  / Protein:  / Nitrite:    Leuk Esterase:  / RBC:  / WBC    Sq Epi:  / Non Sq Epi:  / Bacteria:       Creatinine, Random Urine: 27 mg/dL (03-19 @ 01:00)  Protein/Creatinine Ratio Calculation: 0.3 Ratio (03-19 @ 01:00)

## 2024-03-23 LAB
GLUCOSE BLDC GLUCOMTR-MCNC: 121 MG/DL — HIGH (ref 70–99)
GLUCOSE BLDC GLUCOMTR-MCNC: 145 MG/DL — HIGH (ref 70–99)
GLUCOSE BLDC GLUCOMTR-MCNC: 154 MG/DL — HIGH (ref 70–99)
GLUCOSE BLDC GLUCOMTR-MCNC: 165 MG/DL — HIGH (ref 70–99)

## 2024-03-23 RX ADMIN — Medication 650 MILLIGRAM(S): at 16:41

## 2024-03-23 RX ADMIN — POLYETHYLENE GLYCOL 3350 17 GRAM(S): 17 POWDER, FOR SOLUTION ORAL at 12:29

## 2024-03-23 RX ADMIN — SPIRONOLACTONE 50 MILLIGRAM(S): 25 TABLET, FILM COATED ORAL at 06:35

## 2024-03-23 RX ADMIN — Medication 1: at 12:30

## 2024-03-23 RX ADMIN — Medication 650 MILLIGRAM(S): at 14:21

## 2024-03-23 RX ADMIN — Medication 1: at 17:44

## 2024-03-23 RX ADMIN — Medication 650 MILLIGRAM(S): at 03:57

## 2024-03-23 RX ADMIN — SENNA PLUS 2 TABLET(S): 8.6 TABLET ORAL at 22:26

## 2024-03-23 RX ADMIN — Medication 300 MILLIGRAM(S): at 06:34

## 2024-03-23 RX ADMIN — Medication 81 MILLIGRAM(S): at 12:29

## 2024-03-23 RX ADMIN — Medication 650 MILLIGRAM(S): at 04:57

## 2024-03-23 RX ADMIN — BUMETANIDE 2 MILLIGRAM(S): 0.25 INJECTION INTRAMUSCULAR; INTRAVENOUS at 06:35

## 2024-03-23 RX ADMIN — LOSARTAN POTASSIUM 25 MILLIGRAM(S): 100 TABLET, FILM COATED ORAL at 06:35

## 2024-03-23 NOTE — PROGRESS NOTE ADULT - SUBJECTIVE AND OBJECTIVE BOX
Patient is a 63y old  Male who presents with a chief complaint of Acute decompensated heart failure (23 Mar 2024 10:34)      INTERVAL HPI/OVERNIGHT EVENTS:  T(C): 36.7 (03-23-24 @ 14:10), Max: 36.8 (03-23-24 @ 03:45)  HR: 74 (03-23-24 @ 21:20) (74 - 79)  BP: 101/52 (03-23-24 @ 21:20) (101/52 - 126/80)  RR: 18 (03-23-24 @ 21:20) (16 - 18)  SpO2: 100% (03-23-24 @ 21:20) (98% - 100%)  Wt(kg): --  I&O's Summary    22 Mar 2024 07:01  -  23 Mar 2024 07:00  --------------------------------------------------------  IN: 600 mL / OUT: 3200 mL / NET: -2600 mL        PAST MEDICAL & SURGICAL HISTORY:  HTN (hypertension)      HLD (hyperlipidemia)      DM (diabetes mellitus)      HFrEF (heart failure with reduced ejection fraction)      No significant past surgical history          SOCIAL HISTORY  Alcohol:  Tobacco:  Illicit substance use:    FAMILY HISTORY:    REVIEW OF SYSTEMS:  CONSTITUTIONAL: No fever, weight loss, or fatigue  EYES: No eye pain, visual disturbances, or discharge  ENMT:  No difficulty hearing, tinnitus, vertigo; No sinus or throat pain  NECK: No pain or stiffness  RESPIRATORY: No cough, wheezing, chills or hemoptysis; No shortness of breath  CARDIOVASCULAR: No chest pain, palpitations, dizziness, or leg swelling  GASTROINTESTINAL: No abdominal or epigastric pain. No nausea, vomiting, or hematemesis; No diarrhea or constipation. No melena or hematochezia.  GENITOURINARY: No dysuria, frequency, hematuria, or incontinence  NEUROLOGICAL: No headaches, memory loss, loss of strength, numbness, or tremors  SKIN: No itching, burning, rashes, or lesions   LYMPH NODES: No enlarged glands  ENDOCRINE: No heat or cold intolerance; No hair loss  MUSCULOSKELETAL: No joint pain or swelling; No muscle, back, or extremity pain  PSYCHIATRIC: No depression, anxiety, mood swings, or difficulty sleeping  HEME/LYMPH: No easy bruising, or bleeding gums  ALLERY AND IMMUNOLOGIC: No hives or eczema    RADIOLOGY & ADDITIONAL TESTS:    Imaging Personally Reviewed:  [ ] YES  [ ] NO    Consultant(s) Notes Reviewed:  [ ] YES  [ ] NO    PHYSICAL EXAM:  GENERAL: NAD, well-groomed, well-developed  HEAD:  Atraumatic, Normocephalic  EYES: EOMI, PERRLA, conjunctiva and sclera clear  ENMT: No tonsillar erythema, exudates, or enlargement; Moist mucous membranes, Good dentition, No lesions  NECK: Supple, No JVD, Normal thyroid  NERVOUS SYSTEM:  Alert & Oriented X3, Good concentration; Motor Strength 5/5 B/L upper and lower extremities; DTRs 2+ intact and symmetric  CHEST/LUNG: Clear to percussion bilaterally; No rales, rhonchi, wheezing, or rubs  HEART: Regular rate and rhythm; No murmurs, rubs, or gallops  ABDOMEN: Soft, Nontender, Nondistended; Bowel sounds present  EXTREMITIES:  2+ Peripheral Pulses, No clubbing, cyanosis, or edema  LYMPH: No lymphadenopathy noted  SKIN: No rashes or lesions    LABS:                        11.9   6.02  )-----------( 232      ( 22 Mar 2024 06:07 )             37.7     03-22    138  |  98  |  54<H>  ----------------------------<  109<H>  3.8   |  31  |  1.37<H>    Ca    8.7      22 Mar 2024 06:07  Phos  2.6     03-22  Mg     2.30     03-22        Urinalysis Basic - ( 22 Mar 2024 06:07 )    Color: x / Appearance: x / SG: x / pH: x  Gluc: 109 mg/dL / Ketone: x  / Bili: x / Urobili: x   Blood: x / Protein: x / Nitrite: x   Leuk Esterase: x / RBC: x / WBC x   Sq Epi: x / Non Sq Epi: x / Bacteria: x      CAPILLARY BLOOD GLUCOSE      POCT Blood Glucose.: 145 mg/dL (23 Mar 2024 22:23)  POCT Blood Glucose.: 121 mg/dL (23 Mar 2024 20:56)  POCT Blood Glucose.: 154 mg/dL (23 Mar 2024 17:29)  POCT Blood Glucose.: 165 mg/dL (23 Mar 2024 12:10)        Urinalysis Basic - ( 22 Mar 2024 06:07 )    Color: x / Appearance: x / SG: x / pH: x  Gluc: 109 mg/dL / Ketone: x  / Bili: x / Urobili: x   Blood: x / Protein: x / Nitrite: x   Leuk Esterase: x / RBC: x / WBC x   Sq Epi: x / Non Sq Epi: x / Bacteria: x        MEDICATIONS  (STANDING):  aspirin enteric coated 81 milliGRAM(s) Oral daily  buMETAnide 2 milliGRAM(s) Oral daily  dextrose 5%. 1000 milliLiter(s) (100 mL/Hr) IV Continuous <Continuous>  dextrose 5%. 1000 milliLiter(s) (50 mL/Hr) IV Continuous <Continuous>  dextrose 50% Injectable 25 Gram(s) IV Push once  dextrose 50% Injectable 12.5 Gram(s) IV Push once  dextrose 50% Injectable 25 Gram(s) IV Push once  glucagon  Injectable 1 milliGRAM(s) IntraMuscular once  influenza   Vaccine 0.5 milliLiter(s) IntraMuscular once  insulin lispro (ADMELOG) corrective regimen sliding scale   SubCutaneous three times a day before meals  insulin lispro (ADMELOG) corrective regimen sliding scale   SubCutaneous at bedtime  losartan 25 milliGRAM(s) Oral daily  metoprolol succinate  milliGRAM(s) Oral daily  polyethylene glycol 3350 17 Gram(s) Oral daily  senna 2 Tablet(s) Oral at bedtime  spironolactone 50 milliGRAM(s) Oral daily    MEDICATIONS  (PRN):  acetaminophen     Tablet .. 650 milliGRAM(s) Oral every 6 hours PRN Temp greater or equal to 38C (100.4F), Mild Pain (1 - 3)  aluminum hydroxide/magnesium hydroxide/simethicone Suspension 30 milliLiter(s) Oral every 4 hours PRN Dyspepsia  dextrose Oral Gel 15 Gram(s) Oral once PRN Blood Glucose LESS THAN 70 milliGRAM(s)/deciliter  melatonin 3 milliGRAM(s) Oral at bedtime PRN Insomnia  ondansetron Injectable 4 milliGRAM(s) IV Push every 8 hours PRN Nausea and/or Vomiting      Care Discussed with Consultants/Other Providers [ ] YES  [ ] NO

## 2024-03-23 NOTE — PROGRESS NOTE ADULT - SUBJECTIVE AND OBJECTIVE BOX
CARDIOLOGY FOLLOW UP - Dr. Zavala  Date of Service: 3/23/24  CC: no events    Review of Systems:  Constitutional: No fever, weight loss, or fatigue  Respiratory: No cough, wheezing, or hemoptysis, no shortness of breath  Cardiovascular: No chest pain, palpitations, passing out, dizziness, or leg swelling  Gastrointestinal: No abd or epigastric pain. No nausea, vomiting, or hematemesis; no diarrhea or consiptaiton, no melena or hematochezia  Vascular: No edema     TELEMETRY:    PHYSICAL EXAM:  T(C): 36.6 (03-23-24 @ 09:10), Max: 36.8 (03-23-24 @ 03:45)  HR: 79 (03-23-24 @ 09:10) (76 - 79)  BP: 105/66 (03-23-24 @ 09:10) (105/66 - 126/80)  RR: 18 (03-23-24 @ 09:10) (16 - 18)  SpO2: 100% (03-23-24 @ 09:10) (98% - 100%)  Wt(kg): --  I&O's Summary    22 Mar 2024 07:01  -  23 Mar 2024 07:00  --------------------------------------------------------  IN: 600 mL / OUT: 3200 mL / NET: -2600 mL        Appearance: Normal	  Cardiovascular: Normal S1 S2,RRR, No JVD, No murmurs  Respiratory: Lungs clear to auscultation	  Gastrointestinal:  Soft, Non-tender, + BS	  Extremities: Normal range of motion, No clubbing, cyanosis or edema  Vascular: Peripheral pulses palpable 2+ bilaterally       Home Medications:  METFORMIN HCL 1,000 MG TABLET: TAKE 1 TABLET BY MOUTH TWICE A DAY WITH MEALS (02 Mar 2024 20:59)  METOPROLOL SUCC ER 50 MG TAB: TAKE 1 TABLET BY MOUTH EVERY DAY (02 Mar 2024 20:59)        MEDICATIONS  (STANDING):  aspirin enteric coated 81 milliGRAM(s) Oral daily  buMETAnide 2 milliGRAM(s) Oral daily  dextrose 5%. 1000 milliLiter(s) (100 mL/Hr) IV Continuous <Continuous>  dextrose 5%. 1000 milliLiter(s) (50 mL/Hr) IV Continuous <Continuous>  dextrose 50% Injectable 25 Gram(s) IV Push once  dextrose 50% Injectable 12.5 Gram(s) IV Push once  dextrose 50% Injectable 25 Gram(s) IV Push once  glucagon  Injectable 1 milliGRAM(s) IntraMuscular once  influenza   Vaccine 0.5 milliLiter(s) IntraMuscular once  insulin lispro (ADMELOG) corrective regimen sliding scale   SubCutaneous three times a day before meals  insulin lispro (ADMELOG) corrective regimen sliding scale   SubCutaneous at bedtime  losartan 25 milliGRAM(s) Oral daily  metoprolol succinate  milliGRAM(s) Oral daily  polyethylene glycol 3350 17 Gram(s) Oral daily  senna 2 Tablet(s) Oral at bedtime  spironolactone 50 milliGRAM(s) Oral daily        EKG:  RADIOLOGY:  DIAGNOSTIC TESTING:  [ ] Echocardiogram:  [ ] Catherterization:  [ ] Stress Test:  OTHER:     LABS:	 	                          11.9   6.02  )-----------( 232      ( 22 Mar 2024 06:07 )             37.7     03-22    138  |  98  |  54<H>  ----------------------------<  109<H>  3.8   |  31  |  1.37<H>    Ca    8.7      22 Mar 2024 06:07  Phos  2.6     03-22  Mg     2.30     03-22            CARDIAC MARKERS:

## 2024-03-24 LAB
GLUCOSE BLDC GLUCOMTR-MCNC: 114 MG/DL — HIGH (ref 70–99)
GLUCOSE BLDC GLUCOMTR-MCNC: 143 MG/DL — HIGH (ref 70–99)
GLUCOSE BLDC GLUCOMTR-MCNC: 157 MG/DL — HIGH (ref 70–99)
GLUCOSE BLDC GLUCOMTR-MCNC: 171 MG/DL — HIGH (ref 70–99)

## 2024-03-24 RX ADMIN — Medication 1: at 12:43

## 2024-03-24 RX ADMIN — Medication 300 MILLIGRAM(S): at 05:48

## 2024-03-24 RX ADMIN — SENNA PLUS 2 TABLET(S): 8.6 TABLET ORAL at 21:17

## 2024-03-24 RX ADMIN — BUMETANIDE 2 MILLIGRAM(S): 0.25 INJECTION INTRAMUSCULAR; INTRAVENOUS at 05:49

## 2024-03-24 RX ADMIN — POLYETHYLENE GLYCOL 3350 17 GRAM(S): 17 POWDER, FOR SOLUTION ORAL at 11:37

## 2024-03-24 RX ADMIN — Medication 81 MILLIGRAM(S): at 11:36

## 2024-03-24 RX ADMIN — LOSARTAN POTASSIUM 25 MILLIGRAM(S): 100 TABLET, FILM COATED ORAL at 05:49

## 2024-03-24 RX ADMIN — SPIRONOLACTONE 50 MILLIGRAM(S): 25 TABLET, FILM COATED ORAL at 05:52

## 2024-03-24 RX ADMIN — Medication 650 MILLIGRAM(S): at 05:48

## 2024-03-24 NOTE — PROGRESS NOTE ADULT - SUBJECTIVE AND OBJECTIVE BOX
Patient is a 63y old  Male who presents with a chief complaint of Acute decompensated heart failure (24 Mar 2024 10:36)      INTERVAL HPI/OVERNIGHT EVENTS: seen and examined , doing fair , no SOB   T(C): 36.6 (03-24-24 @ 12:08), Max: 36.9 (03-24-24 @ 01:20)  HR: 78 (03-24-24 @ 12:08) (71 - 78)  BP: 98/56 (03-24-24 @ 12:08) (98/56 - 117/74)  RR: 17 (03-24-24 @ 12:08) (16 - 18)  SpO2: 100% (03-24-24 @ 12:08) (99% - 100%)  Wt(kg): --  I&O's Summary    23 Mar 2024 07:01  -  24 Mar 2024 07:00  --------------------------------------------------------  IN: 610 mL / OUT: 1000 mL / NET: -390 mL    24 Mar 2024 07:01  -  24 Mar 2024 19:43  --------------------------------------------------------  IN: 480 mL / OUT: 500 mL / NET: -20 mL        PAST MEDICAL & SURGICAL HISTORY:  HTN (hypertension)      HLD (hyperlipidemia)      DM (diabetes mellitus)      HFrEF (heart failure with reduced ejection fraction)      No significant past surgical history          SOCIAL HISTORY  Alcohol:  Tobacco:  Illicit substance use:    FAMILY HISTORY:    REVIEW OF SYSTEMS:  CONSTITUTIONAL: No fever, weight loss, or fatigue  EYES: No eye pain, visual disturbances, or discharge  ENMT:  No difficulty hearing, tinnitus, vertigo; No sinus or throat pain  NECK: No pain or stiffness  RESPIRATORY: No cough, wheezing, chills or hemoptysis; No shortness of breath  CARDIOVASCULAR: No chest pain, palpitations, dizziness, or leg swelling  GASTROINTESTINAL: No abdominal or epigastric pain. No nausea, vomiting, or hematemesis; No diarrhea or constipation. No melena or hematochezia.  GENITOURINARY: No dysuria, frequency, hematuria, or incontinence  NEUROLOGICAL: No headaches, memory loss, loss of strength, numbness, or tremors  SKIN: No itching, burning, rashes, or lesions   LYMPH NODES: No enlarged glands  ENDOCRINE: No heat or cold intolerance; No hair loss  MUSCULOSKELETAL: No joint pain or swelling; No muscle, back, or extremity pain  PSYCHIATRIC: No depression, anxiety, mood swings, or difficulty sleeping  HEME/LYMPH: No easy bruising, or bleeding gums  ALLERY AND IMMUNOLOGIC: No hives or eczema    RADIOLOGY & ADDITIONAL TESTS:    Imaging Personally Reviewed:  [ ] YES  [ ] NO    Consultant(s) Notes Reviewed:  [ ] YES  [ ] NO    PHYSICAL EXAM:  GENERAL: NAD, well-groomed, well-developed  HEAD:  Atraumatic, Normocephalic  EYES: EOMI, PERRLA, conjunctiva and sclera clear  ENMT: No tonsillar erythema, exudates, or enlargement; Moist mucous membranes, Good dentition, No lesions  NECK: Supple, No JVD, Normal thyroid  NERVOUS SYSTEM:  Alert & Oriented X3, Good concentration; Motor Strength 5/5 B/L upper and lower extremities; DTRs 2+ intact and symmetric  CHEST/LUNG: Clear to percussion bilaterally; No rales, rhonchi, wheezing, or rubs  HEART: Regular rate and rhythm; No murmurs, rubs, or gallops  ABDOMEN: Soft, Nontender, Nondistended; Bowel sounds present  EXTREMITIES:  2+ Peripheral Pulses, No clubbing, cyanosis, or edema  LYMPH: No lymphadenopathy noted  SKIN: No rashes or lesions    LABS:              CAPILLARY BLOOD GLUCOSE      POCT Blood Glucose.: 143 mg/dL (24 Mar 2024 17:37)  POCT Blood Glucose.: 171 mg/dL (24 Mar 2024 12:37)  POCT Blood Glucose.: 114 mg/dL (24 Mar 2024 09:04)  POCT Blood Glucose.: 145 mg/dL (23 Mar 2024 22:23)  POCT Blood Glucose.: 121 mg/dL (23 Mar 2024 20:56)            MEDICATIONS  (STANDING):  aspirin enteric coated 81 milliGRAM(s) Oral daily  buMETAnide 2 milliGRAM(s) Oral daily  dextrose 5%. 1000 milliLiter(s) (100 mL/Hr) IV Continuous <Continuous>  dextrose 5%. 1000 milliLiter(s) (50 mL/Hr) IV Continuous <Continuous>  dextrose 50% Injectable 25 Gram(s) IV Push once  dextrose 50% Injectable 12.5 Gram(s) IV Push once  dextrose 50% Injectable 25 Gram(s) IV Push once  glucagon  Injectable 1 milliGRAM(s) IntraMuscular once  influenza   Vaccine 0.5 milliLiter(s) IntraMuscular once  insulin lispro (ADMELOG) corrective regimen sliding scale   SubCutaneous three times a day before meals  insulin lispro (ADMELOG) corrective regimen sliding scale   SubCutaneous at bedtime  losartan 25 milliGRAM(s) Oral daily  metoprolol succinate  milliGRAM(s) Oral daily  polyethylene glycol 3350 17 Gram(s) Oral daily  senna 2 Tablet(s) Oral at bedtime  spironolactone 50 milliGRAM(s) Oral daily    MEDICATIONS  (PRN):  acetaminophen     Tablet .. 650 milliGRAM(s) Oral every 6 hours PRN Temp greater or equal to 38C (100.4F), Mild Pain (1 - 3)  aluminum hydroxide/magnesium hydroxide/simethicone Suspension 30 milliLiter(s) Oral every 4 hours PRN Dyspepsia  dextrose Oral Gel 15 Gram(s) Oral once PRN Blood Glucose LESS THAN 70 milliGRAM(s)/deciliter  melatonin 3 milliGRAM(s) Oral at bedtime PRN Insomnia  ondansetron Injectable 4 milliGRAM(s) IV Push every 8 hours PRN Nausea and/or Vomiting      Care Discussed with Consultants/Other Providers [ ] YES  [ ] NO

## 2024-03-24 NOTE — PROGRESS NOTE ADULT - ASSESSMENT
ECHO 3/4/23: EF 33% mild MR, Severe global left ventricular systolic dysfunction  cardiac cath from 3/6/23  Coronaries with minor luminal irregularities. Elevated LVEDP.      A/P  63 yr old male with a pmh of HTN, HF rEF ( EF33%), T2DM on metformin who presents with ROSS and bilateral lower extremity edema with his legs feeling heavy.     #Acute on chronic HFrEF  -vol status improved  -cont bumex 2mg PO daily to min hypokalemia  -hold metolazone  -kcl 20meq qd on d/c   -continue aldactone 50mg qd  -Not compliant with home meds (lasix 40mg daily, metoprolol XL 50mg daily, hydralazine 25mg TID)  -repeat echo this admission with ef 27%   Left ventricular systolic function is severely decreased  -Prior cardiac cath from 3/6/23  Coronaries with minor luminal irregularities. Elevated LVEDP.   -strict I/O, monitor daily weights, fluid restriction   -Continue toprol 300mg PO daily-- prior events on tele noted   -s/p ICD  -per [employer] insurance does not cover entresto   -c/w losartan     #syncope, loc  -depressed ed, ivcd, nsvt on tele, up to 31 beats  -eps f/u  -s/p icd    #Hypertensive urgency.   -bp now controlled   -continue meds     #T2DM (type 2 diabetes mellitus).   -med f/u     #HLD  -cont statin    dvt ppx    55 minutes spent on total encounter; more than 50% of the visit was spent counseling and/or coordinating care by the attending physician.,        cv stable for d/c planning to [facility]

## 2024-03-24 NOTE — PROGRESS NOTE ADULT - ASSESSMENT
63y Male with history of CHF presents with SOB. Nephrology consulted for elevated Scr.    1. CKD-3a: Giovanni Scr 1-1.2 however likely dilutional in setting of volume overload. Suspect current values represent true baseline. UA bland with mild proteinuria (spot urine TP/CR 0.3). Bladder scan negative. Avoid nephrotoxins. Monitor electrolytes.    2. Hypokalemia: Resolved. Will start standing KCL 20 meQ daily on discharge. Monitor serum potassium.    3. Metabolic alkalosis: Improving as per blood gas. Monitor pH.    4. HTN with CKD: BP controlled. Continue with current medications. Monitor BP.    5. Acute on chronic systolic HF: Improved. Continue with bumex 2 mg PO daily. TTE with severely decreased LVSF. Monitor .      [facility] NEPHROLOGY  Adi Diaz M.D.  Nj Abarca D.O.  Kezia Dunn M.D.  MD Ute Humphrey, MSN, ANP-C    Telephone: [phone]  Facsimile: [phone] [place] [address], [address]  [place] [address]

## 2024-03-24 NOTE — PROGRESS NOTE ADULT - SUBJECTIVE AND OBJECTIVE BOX
CARDIOLOGY FOLLOW UP - Dr. Zavala  Date of Service: 3/24/24  CC: no events    Review of Systems:  Constitutional: No fever, weight loss, or fatigue  Respiratory: No cough, wheezing, or hemoptysis, no shortness of breath  Cardiovascular: No chest pain, palpitations, passing out, dizziness, or leg swelling  Gastrointestinal: No abd or epigastric pain. No nausea, vomiting, or hematemesis; no diarrhea or consiptaiton, no melena or hematochezia  Vascular: No edema     TELEMETRY:    PHYSICAL EXAM:  T(C): 36.6 (03-24-24 @ 05:45), Max: 36.9 (03-24-24 @ 01:20)  HR: 78 (03-24-24 @ 05:45) (71 - 79)  BP: 117/74 (03-24-24 @ 05:45) (101/52 - 117/74)  RR: 18 (03-24-24 @ 05:45) (16 - 18)  SpO2: 100% (03-24-24 @ 05:45) (99% - 100%)  Wt(kg): --  I&O's Summary    23 Mar 2024 07:01  -  24 Mar 2024 07:00  --------------------------------------------------------  IN: 610 mL / OUT: 1000 mL / NET: -390 mL        Appearance: Normal	  Cardiovascular: Normal S1 S2,RRR, No JVD, No murmurs  Respiratory: Lungs clear to auscultation	  Gastrointestinal:  Soft, Non-tender, + BS	  Extremities: Normal range of motion, No clubbing, cyanosis or edema  Vascular: Peripheral pulses palpable 2+ bilaterally       Home Medications:  METFORMIN HCL 1,000 MG TABLET: TAKE 1 TABLET BY MOUTH TWICE A DAY WITH MEALS (02 Mar 2024 20:59)  METOPROLOL SUCC ER 50 MG TAB: TAKE 1 TABLET BY MOUTH EVERY DAY (02 Mar 2024 20:59)        MEDICATIONS  (STANDING):  aspirin enteric coated 81 milliGRAM(s) Oral daily  buMETAnide 2 milliGRAM(s) Oral daily  dextrose 5%. 1000 milliLiter(s) (100 mL/Hr) IV Continuous <Continuous>  dextrose 5%. 1000 milliLiter(s) (50 mL/Hr) IV Continuous <Continuous>  dextrose 50% Injectable 25 Gram(s) IV Push once  dextrose 50% Injectable 12.5 Gram(s) IV Push once  dextrose 50% Injectable 25 Gram(s) IV Push once  glucagon  Injectable 1 milliGRAM(s) IntraMuscular once  influenza   Vaccine 0.5 milliLiter(s) IntraMuscular once  insulin lispro (ADMELOG) corrective regimen sliding scale   SubCutaneous three times a day before meals  insulin lispro (ADMELOG) corrective regimen sliding scale   SubCutaneous at bedtime  losartan 25 milliGRAM(s) Oral daily  metoprolol succinate  milliGRAM(s) Oral daily  polyethylene glycol 3350 17 Gram(s) Oral daily  senna 2 Tablet(s) Oral at bedtime  spironolactone 50 milliGRAM(s) Oral daily        EKG:  RADIOLOGY:  DIAGNOSTIC TESTING:  [ ] Echocardiogram:  [ ] Catherterization:  [ ] Stress Test:  OTHER:     LABS:	 	                  CARDIAC MARKERS:

## 2024-03-24 NOTE — PROGRESS NOTE ADULT - ASSESSMENT
63 yr old male with a pmh of HTN, HF rEF ( EF33%), T2DM on metformin who presents with ROSS and bilateral lower extremity edema with his legs feeling heavy.     #Acute on chronic systolic heart Failure : due to non complaince with meds : d/w pt and niece need to adhere with meds and need for AICD after re-evaluation of compliance   cont diuresis   now bumex changed to 2 mg daily   off metolazone     # DONNA :   seen by renal   bumex dose adjusted     #syncope  #NSVT on tele   on BBlocker   seen by EP   s/p AICD implant    #Hypertensive   controlled now     #DM-2   a1c 7.1  c/w Insulin / FSBS    #HLD  -cont statin    # non compliance with meds :  counseling done     dispo: doing fair , seen by PT recommend STR, ok to be d/c to rehab pending insurance approval

## 2024-03-24 NOTE — PROGRESS NOTE ADULT - SUBJECTIVE AND OBJECTIVE BOX
[facility] NEPHROLOGY- PROGRESS NOTE    63y Male with history of CHF presents with SOB. Nephrology consulted for elevated Scr.    REVIEW OF SYSTEMS:  Gen: no fevers  Cards: no chest pain  Resp: no dyspnea  GI: no nausea or vomiting or diarrhea  Vascular: + LE edema improving    No Known Allergies      Hospital Medications: Medications reviewed        VITALS:  T(F): 97.8 (03-24-24 @ 05:45), Max: 98.4 (03-24-24 @ 01:20)  HR: 78 (03-24-24 @ 05:45)  BP: 117/74 (03-24-24 @ 05:45)  RR: 18 (03-24-24 @ 05:45)  SpO2: 100% (03-24-24 @ 05:45)  Wt(kg): --    03-23 @ 07:01  -  03-24 @ 07:00  --------------------------------------------------------  IN: 610 mL / OUT: 1000 mL / NET: -390 mL    03-24 @ 07:01  -  03-24 @ 10:37  --------------------------------------------------------  IN: 240 mL / OUT: 500 mL / NET: -260 mL      PHYSICAL EXAM:    Gen: NAD, calm  Cards: RRR, +S1/S2, no M/G/R  Resp: CTA B/L  GI: soft, NT/ND, NABS  Vascular: trace LE edema B/L with skin wrinkling      LABS: no new labs

## 2024-03-25 LAB
ALBUMIN SERPL ELPH-MCNC: 3.1 G/DL — LOW (ref 3.3–5)
ALP SERPL-CCNC: 284 U/L — HIGH (ref 40–120)
ALT FLD-CCNC: 20 U/L — SIGNIFICANT CHANGE UP (ref 4–41)
ANION GAP SERPL CALC-SCNC: 9 MMOL/L — SIGNIFICANT CHANGE UP (ref 7–14)
AST SERPL-CCNC: 31 U/L — SIGNIFICANT CHANGE UP (ref 4–40)
BILIRUB SERPL-MCNC: 0.6 MG/DL — SIGNIFICANT CHANGE UP (ref 0.2–1.2)
BUN SERPL-MCNC: 56 MG/DL — HIGH (ref 7–23)
CALCIUM SERPL-MCNC: 8.7 MG/DL — SIGNIFICANT CHANGE UP (ref 8.4–10.5)
CHLORIDE SERPL-SCNC: 97 MMOL/L — LOW (ref 98–107)
CO2 SERPL-SCNC: 28 MMOL/L — SIGNIFICANT CHANGE UP (ref 22–31)
CREAT SERPL-MCNC: 1.18 MG/DL — SIGNIFICANT CHANGE UP (ref 0.5–1.3)
EGFR: 69 ML/MIN/1.73M2 — SIGNIFICANT CHANGE UP
GLUCOSE BLDC GLUCOMTR-MCNC: 105 MG/DL — HIGH (ref 70–99)
GLUCOSE BLDC GLUCOMTR-MCNC: 136 MG/DL — HIGH (ref 70–99)
GLUCOSE BLDC GLUCOMTR-MCNC: 177 MG/DL — HIGH (ref 70–99)
GLUCOSE BLDC GLUCOMTR-MCNC: 208 MG/DL — HIGH (ref 70–99)
GLUCOSE SERPL-MCNC: 116 MG/DL — HIGH (ref 70–99)
POTASSIUM SERPL-MCNC: 4.1 MMOL/L — SIGNIFICANT CHANGE UP (ref 3.5–5.3)
POTASSIUM SERPL-SCNC: 4.1 MMOL/L — SIGNIFICANT CHANGE UP (ref 3.5–5.3)
PROT SERPL-MCNC: 7.3 G/DL — SIGNIFICANT CHANGE UP (ref 6–8.3)
SODIUM SERPL-SCNC: 134 MMOL/L — LOW (ref 135–145)

## 2024-03-25 RX ADMIN — Medication 1: at 17:56

## 2024-03-25 RX ADMIN — BUMETANIDE 2 MILLIGRAM(S): 0.25 INJECTION INTRAMUSCULAR; INTRAVENOUS at 05:17

## 2024-03-25 RX ADMIN — SENNA PLUS 2 TABLET(S): 8.6 TABLET ORAL at 22:25

## 2024-03-25 RX ADMIN — Medication 81 MILLIGRAM(S): at 12:36

## 2024-03-25 RX ADMIN — POLYETHYLENE GLYCOL 3350 17 GRAM(S): 17 POWDER, FOR SOLUTION ORAL at 12:36

## 2024-03-25 RX ADMIN — Medication 300 MILLIGRAM(S): at 05:17

## 2024-03-25 RX ADMIN — LOSARTAN POTASSIUM 25 MILLIGRAM(S): 100 TABLET, FILM COATED ORAL at 12:36

## 2024-03-25 RX ADMIN — SPIRONOLACTONE 50 MILLIGRAM(S): 25 TABLET, FILM COATED ORAL at 05:18

## 2024-03-25 NOTE — PROGRESS NOTE ADULT - SUBJECTIVE AND OBJECTIVE BOX
[facility] NEPHROLOGY- PROGRESS NOTE    63y Male with history of CHF presents with SOB. Nephrology consulted for elevated Scr.    REVIEW OF SYSTEMS:  Gen: no fevers  Cards: no chest pain  Resp: no dyspnea  GI: no nausea or vomiting or diarrhea  Vascular: + LE edema improving    No Known Allergies      Hospital Medications: Medications reviewed      VITALS:  T(F): 98.1 (03-25-24 @ 12:58), Max: 98.8 (03-25-24 @ 05:13)  HR: 80 (03-25-24 @ 12:58)  BP: 97/78 (03-25-24 @ 12:58)  RR: 18 (03-25-24 @ 12:58)  SpO2: 98% (03-25-24 @ 12:58)  Wt(kg): --    03-24 @ 07:01  -  03-25 @ 07:00  --------------------------------------------------------  IN: 480 mL / OUT: 950 mL / NET: -470 mL        PHYSICAL EXAM:    Gen: NAD, calm  Cards: RRR, +S1/S2, no M/G/R  Resp: CTA B/L  GI: soft, NT/ND, NABS  Vascular: trace LE edema B/L      LABS:  03-25    134<L>  |  97<L>  |  56<H>  ----------------------------<  116<H>  4.1   |  28  |  1.18    Ca    8.7      25 Mar 2024 07:28    TPro  7.3  /  Alb  3.1<L>  /  TBili  0.6  /  DBili      /  AST  31  /  ALT  20  /  AlkPhos  284<H>  03-25    Creatinine Trend: 1.18 <--, 1.37 <--, 1.58 <--, 1.67 <--, 1.36 <--    Urine Studies:  Urinalysis Basic - ( 25 Mar 2024 07:28 )    Color:  / Appearance:  / SG:  / pH:   Gluc: 116 mg/dL / Ketone:   / Bili:  / Urobili:    Blood:  / Protein:  / Nitrite:    Leuk Esterase:  / RBC:  / WBC    Sq Epi:  / Non Sq Epi:  / Bacteria:       Creatinine, Random Urine: 27 mg/dL (03-19 @ 01:00)  Protein/Creatinine Ratio Calculation: 0.3 Ratio (03-19 @ 01:00)

## 2024-03-25 NOTE — PROGRESS NOTE ADULT - ASSESSMENT
ECHO 3/4/23: EF 33% mild MR, Severe global left ventricular systolic dysfunction  cardiac cath from 3/6/23  Coronaries with minor luminal irregularities. Elevated LVEDP.      A/P  63 yr old male with a pmh of HTN, HF rEF ( EF33%), T2DM on metformin who presents with ROSS and bilateral lower extremity edema with his legs feeling heavy.     #Acute on chronic HFrEF  -vol status improved  -cont bumex 2mg PO daily to min hypokalemia  -hold metolazone  -kcl 20meq qd on d/c   -continue aldactone 50mg qd  -Not compliant with home meds (lasix 40mg daily, metoprolol XL 50mg daily, hydralazine 25mg TID)  -repeat echo this admission with ef 27%   Left ventricular systolic function is severely decreased  -Prior cardiac cath from 3/6/23  Coronaries with minor luminal irregularities. Elevated LVEDP.   -strict I/O, monitor daily weights, fluid restriction   -Continue toprol 300mg PO daily-- prior events on tele noted   -s/p ICD  -per [employer] insurance does not cover entresto   -c/w losartan     #syncope, loc  -depressed ed, ivcd, nsvt on tele, up to 31 beats  -eps f/u  -s/p icd    #Hypertensive urgency.   -bp now controlled   -continue meds     #T2DM (type 2 diabetes mellitus).   -med f/u     #HLD  -cont statin    dvt ppx    cv stable for d/c planning to [facility]

## 2024-03-25 NOTE — PROGRESS NOTE ADULT - ASSESSMENT
63y Male with history of CHF presents with SOB. Nephrology consulted for elevated Scr.    1. CKD-3a: Scr relatively stable. UA bland with mild proteinuria (spot urine TP/CR 0.3). Bladder scan negative. Outpatient CKD work up. Avoid nephrotoxins. Monitor electrolytes.    2. Hypokalemia: Resolved. Continue with aldactone. Can hold off on potassium supplementation on discharge. Monitor serum potassium.    3. Metabolic alkalosis: Resolved with de-escalation of diuretics. Monitor pH.    4. HTN with CKD: BP controlled. Continue with current medications. Monitor BP.    5. Acute on chronic systolic HF: Improved. Continue with bumex 2 mg PO daily. TTE with severely decreased LVSF. Monitor .      [facility] NEPHROLOGY  Adi Diaz M.D.  Nj Abarca D.O.  Kezia Dunn M.D.  MD Ute Humphrey, MSN, ANP-C    Telephone: [phone]  Facsimile: [phone] [place] [address], [address]  [place] [address]

## 2024-03-25 NOTE — PROGRESS NOTE ADULT - SUBJECTIVE AND OBJECTIVE BOX
Patient is a 63y old  Male who presents with a chief complaint of Acute decompensated heart failure (25 Mar 2024 14:02)      INTERVAL HPI/OVERNIGHT EVENTS: seen and examined , doing fair   T(C): 36.4 (03-25-24 @ 22:08), Max: 37.1 (03-25-24 @ 05:13)  HR: 78 (03-25-24 @ 22:08) (78 - 84)  BP: 110/69 (03-25-24 @ 22:08) (91/61 - 110/69)  RR: 17 (03-25-24 @ 22:08) (16 - 18)  SpO2: 99% (03-25-24 @ 22:08) (98% - 100%)  Wt(kg): --  I&O's Summary    24 Mar 2024 07:01  -  25 Mar 2024 07:00  --------------------------------------------------------  IN: 480 mL / OUT: 950 mL / NET: -470 mL        PAST MEDICAL & SURGICAL HISTORY:  HTN (hypertension)      HLD (hyperlipidemia)      DM (diabetes mellitus)      HFrEF (heart failure with reduced ejection fraction)      No significant past surgical history          SOCIAL HISTORY  Alcohol:  Tobacco:  Illicit substance use:    FAMILY HISTORY:    REVIEW OF SYSTEMS:  CONSTITUTIONAL: No fever, weight loss, or fatigue  EYES: No eye pain, visual disturbances, or discharge  ENMT:  No difficulty hearing, tinnitus, vertigo; No sinus or throat pain  NECK: No pain or stiffness  RESPIRATORY: No cough, wheezing, chills or hemoptysis; No shortness of breath  CARDIOVASCULAR: No chest pain, palpitations, dizziness, or leg swelling  GASTROINTESTINAL: No abdominal or epigastric pain. No nausea, vomiting, or hematemesis; No diarrhea or constipation. No melena or hematochezia.  GENITOURINARY: No dysuria, frequency, hematuria, or incontinence  NEUROLOGICAL: No headaches, memory loss, loss of strength, numbness, or tremors  SKIN: No itching, burning, rashes, or lesions   LYMPH NODES: No enlarged glands  ENDOCRINE: No heat or cold intolerance; No hair loss  MUSCULOSKELETAL: No joint pain or swelling; No muscle, back, or extremity pain  PSYCHIATRIC: No depression, anxiety, mood swings, or difficulty sleeping  HEME/LYMPH: No easy bruising, or bleeding gums  ALLERY AND IMMUNOLOGIC: No hives or eczema    RADIOLOGY & ADDITIONAL TESTS:    Imaging Personally Reviewed:  [ ] YES  [ ] NO    Consultant(s) Notes Reviewed:  [ ] YES  [ ] NO    PHYSICAL EXAM:  GENERAL: NAD, well-groomed, well-developed  HEAD:  Atraumatic, Normocephalic  EYES: EOMI, PERRLA, conjunctiva and sclera clear  ENMT: No tonsillar erythema, exudates, or enlargement; Moist mucous membranes, Good dentition, No lesions  NECK: Supple, No JVD, Normal thyroid  NERVOUS SYSTEM:  Alert & Oriented X3, Good concentration; Motor Strength 5/5 B/L upper and lower extremities; DTRs 2+ intact and symmetric  CHEST/LUNG: Clear to percussion bilaterally; No rales, rhonchi, wheezing, or rubs  HEART: Regular rate and rhythm; No murmurs, rubs, or gallops  ABDOMEN: Soft, Nontender, Nondistended; Bowel sounds present  EXTREMITIES:  2+ Peripheral Pulses, No clubbing, cyanosis, or edema  LYMPH: No lymphadenopathy noted  SKIN: No rashes or lesions    LABS:    03-25    134<L>  |  97<L>  |  56<H>  ----------------------------<  116<H>  4.1   |  28  |  1.18    Ca    8.7      25 Mar 2024 07:28    TPro  7.3  /  Alb  3.1<L>  /  TBili  0.6  /  DBili  x   /  AST  31  /  ALT  20  /  AlkPhos  284<H>  03-25      Urinalysis Basic - ( 25 Mar 2024 07:28 )    Color: x / Appearance: x / SG: x / pH: x  Gluc: 116 mg/dL / Ketone: x  / Bili: x / Urobili: x   Blood: x / Protein: x / Nitrite: x   Leuk Esterase: x / RBC: x / WBC x   Sq Epi: x / Non Sq Epi: x / Bacteria: x      CAPILLARY BLOOD GLUCOSE      POCT Blood Glucose.: 208 mg/dL (25 Mar 2024 21:21)  POCT Blood Glucose.: 177 mg/dL (25 Mar 2024 17:24)  POCT Blood Glucose.: 136 mg/dL (25 Mar 2024 12:51)  POCT Blood Glucose.: 105 mg/dL (25 Mar 2024 08:21)  POCT Blood Glucose.: 157 mg/dL (24 Mar 2024 22:45)        Urinalysis Basic - ( 25 Mar 2024 07:28 )    Color: x / Appearance: x / SG: x / pH: x  Gluc: 116 mg/dL / Ketone: x  / Bili: x / Urobili: x   Blood: x / Protein: x / Nitrite: x   Leuk Esterase: x / RBC: x / WBC x   Sq Epi: x / Non Sq Epi: x / Bacteria: x        MEDICATIONS  (STANDING):  aspirin enteric coated 81 milliGRAM(s) Oral daily  buMETAnide 2 milliGRAM(s) Oral daily  dextrose 5%. 1000 milliLiter(s) (100 mL/Hr) IV Continuous <Continuous>  dextrose 5%. 1000 milliLiter(s) (50 mL/Hr) IV Continuous <Continuous>  dextrose 50% Injectable 25 Gram(s) IV Push once  dextrose 50% Injectable 12.5 Gram(s) IV Push once  dextrose 50% Injectable 25 Gram(s) IV Push once  glucagon  Injectable 1 milliGRAM(s) IntraMuscular once  influenza   Vaccine 0.5 milliLiter(s) IntraMuscular once  insulin lispro (ADMELOG) corrective regimen sliding scale   SubCutaneous three times a day before meals  insulin lispro (ADMELOG) corrective regimen sliding scale   SubCutaneous at bedtime  losartan 25 milliGRAM(s) Oral daily  metoprolol succinate  milliGRAM(s) Oral daily  polyethylene glycol 3350 17 Gram(s) Oral daily  senna 2 Tablet(s) Oral at bedtime  spironolactone 50 milliGRAM(s) Oral daily    MEDICATIONS  (PRN):  acetaminophen     Tablet .. 650 milliGRAM(s) Oral every 6 hours PRN Temp greater or equal to 38C (100.4F), Mild Pain (1 - 3)  aluminum hydroxide/magnesium hydroxide/simethicone Suspension 30 milliLiter(s) Oral every 4 hours PRN Dyspepsia  dextrose Oral Gel 15 Gram(s) Oral once PRN Blood Glucose LESS THAN 70 milliGRAM(s)/deciliter  melatonin 3 milliGRAM(s) Oral at bedtime PRN Insomnia  ondansetron Injectable 4 milliGRAM(s) IV Push every 8 hours PRN Nausea and/or Vomiting      Care Discussed with Consultants/Other Providers [ ] YES  [ ] NO

## 2024-03-25 NOTE — PROGRESS NOTE ADULT - SUBJECTIVE AND OBJECTIVE BOX
CARDIOLOGY FOLLOW UP - Dr. Zavala  DATE OF SERVICE: 3/25/24    CC no cp or sob       REVIEW OF SYSTEMS:  CONSTITUTIONAL: No fever, weight loss, or fatigue  RESPIRATORY: No cough, wheezing, chills or hemoptysis; No Shortness of Breath  CARDIOVASCULAR: No chest pain, palpitations, passing out, dizziness, or leg swelling  GASTROINTESTINAL: No abdominal or epigastric pain. No nausea, vomiting, or hematemesis; No diarrhea or constipation. No melena or hematochezia.  VASCULAR: No edema     PHYSICAL EXAM:  T(C): 37.1 (03-25-24 @ 05:13), Max: 37.1 (03-25-24 @ 05:13)  HR: 83 (03-25-24 @ 05:13) (78 - 86)  BP: 104/61 (03-25-24 @ 05:13) (98/56 - 108/58)  RR: 18 (03-25-24 @ 05:13) (17 - 18)  SpO2: 100% (03-25-24 @ 05:13) (100% - 100%)  Wt(kg): --  I&O's Summary    24 Mar 2024 07:01  -  25 Mar 2024 07:00  --------------------------------------------------------  IN: 480 mL / OUT: 950 mL / NET: -470 mL        Appearance: Normal	  Cardiovascular: Normal S1 S2,RRR, No JVD, No murmurs  Respiratory: Lungs clear to auscultation	  Gastrointestinal:  Soft, Non-tender, + BS	  Extremities: Normal range of motion, No clubbing, cyanosis or edema      Home Medications:  METFORMIN HCL 1,000 MG TABLET: TAKE 1 TABLET BY MOUTH TWICE A DAY WITH MEALS (02 Mar 2024 20:59)  METOPROLOL SUCC ER 50 MG TAB: TAKE 1 TABLET BY MOUTH EVERY DAY (02 Mar 2024 20:59)      MEDICATIONS  (STANDING):  aspirin enteric coated 81 milliGRAM(s) Oral daily  buMETAnide 2 milliGRAM(s) Oral daily  dextrose 5%. 1000 milliLiter(s) (100 mL/Hr) IV Continuous <Continuous>  dextrose 5%. 1000 milliLiter(s) (50 mL/Hr) IV Continuous <Continuous>  dextrose 50% Injectable 25 Gram(s) IV Push once  dextrose 50% Injectable 12.5 Gram(s) IV Push once  dextrose 50% Injectable 25 Gram(s) IV Push once  glucagon  Injectable 1 milliGRAM(s) IntraMuscular once  influenza   Vaccine 0.5 milliLiter(s) IntraMuscular once  insulin lispro (ADMELOG) corrective regimen sliding scale   SubCutaneous three times a day before meals  insulin lispro (ADMELOG) corrective regimen sliding scale   SubCutaneous at bedtime  losartan 25 milliGRAM(s) Oral daily  metoprolol succinate  milliGRAM(s) Oral daily  polyethylene glycol 3350 17 Gram(s) Oral daily  senna 2 Tablet(s) Oral at bedtime  spironolactone 50 milliGRAM(s) Oral daily      TELEMETRY: nsr   3 -7 beats wct	    ECG:  	  RADIOLOGY:   DIAGNOSTIC TESTING:  [ ] Echocardiogram:  [ ]  Catheterization:  [ ] Stress Test:    OTHER: 	    LABS:	 	        03-25    134<L>  |  97<L>  |  56<H>  ----------------------------<  116<H>  4.1   |  28  |  1.18    Ca    8.7      25 Mar 2024 07:28    TPro  7.3  /  Alb  3.1<L>  /  TBili  0.6  /  DBili  x   /  AST  31  /  ALT  20  /  AlkPhos  284<H>  03-25

## 2024-03-26 LAB
GLUCOSE BLDC GLUCOMTR-MCNC: 130 MG/DL — HIGH (ref 70–99)
GLUCOSE BLDC GLUCOMTR-MCNC: 149 MG/DL — HIGH (ref 70–99)
GLUCOSE BLDC GLUCOMTR-MCNC: 159 MG/DL — HIGH (ref 70–99)
GLUCOSE BLDC GLUCOMTR-MCNC: 93 MG/DL — SIGNIFICANT CHANGE UP (ref 70–99)

## 2024-03-26 RX ADMIN — Medication 81 MILLIGRAM(S): at 17:47

## 2024-03-26 RX ADMIN — BUMETANIDE 2 MILLIGRAM(S): 0.25 INJECTION INTRAMUSCULAR; INTRAVENOUS at 05:31

## 2024-03-26 NOTE — PROGRESS NOTE ADULT - ASSESSMENT
63 yr old male with a pmh of HTN, HF rEF ( EF33%), T2DM on metformin who presents with ROSS and bilateral lower extremity edema with his legs feeling heavy.     #Acute on chronic systolic heart Failure : due to non complaince with meds : d/w pt and niece need to adhere with meds and need for AICD after re-evaluation of compliance   cont diuresis   now bumex changed to 2 mg daily   off metolazone     # DONNA :   resolved     #syncope  #NSVT on tele   on BBlocker   seen by EP   s/p AICD implant    #Hypertensive   controlled now     #DM-2   a1c 7.1  c/w Insulin / FSBS    #HLD  -cont statin    # non compliance with meds :  counseling done     dispo: stable for d/c pending ins auth for STR

## 2024-03-26 NOTE — PROGRESS NOTE ADULT - ASSESSMENT
ECHO 3/4/23: EF 33% mild MR, Severe global left ventricular systolic dysfunction  cardiac cath from 3/6/23  Coronaries with minor luminal irregularities. Elevated LVEDP.      A/P  63 yr old male with a pmh of HTN, HF rEF ( EF33%), T2DM on metformin who presents with ROSS and bilateral lower extremity edema with his legs feeling heavy.     #Acute on chronic HFrEF  -vol status improved  -cont bumex 2mg PO daily to min hypokalemia  -hold metolazone  -kcl 20meq qd on d/c   -continue aldactone 50mg qd  -Not compliant with home meds (lasix 40mg daily, metoprolol XL 50mg daily, hydralazine 25mg TID)  -repeat echo this admission with ef 27%   Left ventricular systolic function is severely decreased  -Prior cardiac cath from 3/6/23  Coronaries with minor luminal irregularities. Elevated LVEDP.   -strict I/O, monitor daily weights, fluid restriction   -Continue toprol 300mg PO daily-- prior events on tele noted , brief nsvt noted   -s/p ICD  -per [employer] insurance does not cover entresto   -c/w losartan     #syncope, loc  -depressed ed, ivcd, nsvt on tele, up to 31 beats  -eps f/u  -s/p icd    #Hypertensive urgency.   -bp now controlled   -continue meds     #T2DM (type 2 diabetes mellitus).   -med f/u     #HLD  -cont statin    dvt ppx  cv stable for d/c planning to [facility]

## 2024-03-26 NOTE — PROGRESS NOTE ADULT - SUBJECTIVE AND OBJECTIVE BOX
Patient is a 63y old  Male who presents with a chief complaint of Acute decompensated heart failure (26 Mar 2024 12:56)      INTERVAL HPI/OVERNIGHT EVENTS: mariel nd examined, NAD    T(C): 36.3 (03-26-24 @ 05:26), Max: 36.4 (03-25-24 @ 19:02)  HR: 80 (03-26-24 @ 05:26) (78 - 84)  BP: 93/60 (03-26-24 @ 05:26) (91/61 - 110/69)  RR: 18 (03-26-24 @ 05:26) (16 - 18)  SpO2: 99% (03-26-24 @ 05:26) (98% - 99%)  Wt(kg): --  I&O's Summary      PAST MEDICAL & SURGICAL HISTORY:  HTN (hypertension)      HLD (hyperlipidemia)      DM (diabetes mellitus)      HFrEF (heart failure with reduced ejection fraction)      No significant past surgical history          SOCIAL HISTORY  Alcohol:  Tobacco:  Illicit substance use:    FAMILY HISTORY:    REVIEW OF SYSTEMS:  CONSTITUTIONAL: No fever, weight loss, or fatigue  EYES: No eye pain, visual disturbances, or discharge  ENMT:  No difficulty hearing, tinnitus, vertigo; No sinus or throat pain  NECK: No pain or stiffness  RESPIRATORY: No cough, wheezing, chills or hemoptysis; No shortness of breath  CARDIOVASCULAR: No chest pain, palpitations, dizziness, or leg swelling  GASTROINTESTINAL: No abdominal or epigastric pain. No nausea, vomiting, or hematemesis; No diarrhea or constipation. No melena or hematochezia.  GENITOURINARY: No dysuria, frequency, hematuria, or incontinence  NEUROLOGICAL: No headaches, memory loss, loss of strength, numbness, or tremors  SKIN: No itching, burning, rashes, or lesions   LYMPH NODES: No enlarged glands  ENDOCRINE: No heat or cold intolerance; No hair loss  MUSCULOSKELETAL: No joint pain or swelling; No muscle, back, or extremity pain  PSYCHIATRIC: No depression, anxiety, mood swings, or difficulty sleeping  HEME/LYMPH: No easy bruising, or bleeding gums  ALLERY AND IMMUNOLOGIC: No hives or eczema    RADIOLOGY & ADDITIONAL TESTS:    Imaging Personally Reviewed:  [ ] YES  [ ] NO    Consultant(s) Notes Reviewed:  [ ] YES  [ ] NO    PHYSICAL EXAM:  GENERAL: NAD, well-groomed, well-developed  HEAD:  Atraumatic, Normocephalic  EYES: EOMI, PERRLA, conjunctiva and sclera clear  ENMT: No tonsillar erythema, exudates, or enlargement; Moist mucous membranes, Good dentition, No lesions  NECK: Supple, No JVD, Normal thyroid  NERVOUS SYSTEM:  Alert & Oriented X3, Good concentration; Motor Strength 5/5 B/L upper and lower extremities; DTRs 2+ intact and symmetric  CHEST/LUNG: Clear to percussion bilaterally; No rales, rhonchi, wheezing, or rubs  HEART: Regular rate and rhythm; No murmurs, rubs, or gallops  ABDOMEN: Soft, Nontender, Nondistended; Bowel sounds present  EXTREMITIES:  2+ Peripheral Pulses, No clubbing, cyanosis, or edema  LYMPH: No lymphadenopathy noted  SKIN: No rashes or lesions    LABS:    03-25    134<L>  |  97<L>  |  56<H>  ----------------------------<  116<H>  4.1   |  28  |  1.18    Ca    8.7      25 Mar 2024 07:28    TPro  7.3  /  Alb  3.1<L>  /  TBili  0.6  /  DBili  x   /  AST  31  /  ALT  20  /  AlkPhos  284<H>  03-25      Urinalysis Basic - ( 25 Mar 2024 07:28 )    Color: x / Appearance: x / SG: x / pH: x  Gluc: 116 mg/dL / Ketone: x  / Bili: x / Urobili: x   Blood: x / Protein: x / Nitrite: x   Leuk Esterase: x / RBC: x / WBC x   Sq Epi: x / Non Sq Epi: x / Bacteria: x      CAPILLARY BLOOD GLUCOSE      POCT Blood Glucose.: 149 mg/dL (26 Mar 2024 12:42)  POCT Blood Glucose.: 93 mg/dL (26 Mar 2024 08:52)  POCT Blood Glucose.: 208 mg/dL (25 Mar 2024 21:21)  POCT Blood Glucose.: 177 mg/dL (25 Mar 2024 17:24)        Urinalysis Basic - ( 25 Mar 2024 07:28 )    Color: x / Appearance: x / SG: x / pH: x  Gluc: 116 mg/dL / Ketone: x  / Bili: x / Urobili: x   Blood: x / Protein: x / Nitrite: x   Leuk Esterase: x / RBC: x / WBC x   Sq Epi: x / Non Sq Epi: x / Bacteria: x        MEDICATIONS  (STANDING):  aspirin enteric coated 81 milliGRAM(s) Oral daily  buMETAnide 2 milliGRAM(s) Oral daily  dextrose 5%. 1000 milliLiter(s) (100 mL/Hr) IV Continuous <Continuous>  dextrose 5%. 1000 milliLiter(s) (50 mL/Hr) IV Continuous <Continuous>  dextrose 50% Injectable 25 Gram(s) IV Push once  dextrose 50% Injectable 12.5 Gram(s) IV Push once  dextrose 50% Injectable 25 Gram(s) IV Push once  glucagon  Injectable 1 milliGRAM(s) IntraMuscular once  influenza   Vaccine 0.5 milliLiter(s) IntraMuscular once  insulin lispro (ADMELOG) corrective regimen sliding scale   SubCutaneous three times a day before meals  insulin lispro (ADMELOG) corrective regimen sliding scale   SubCutaneous at bedtime  losartan 25 milliGRAM(s) Oral daily  metoprolol succinate  milliGRAM(s) Oral daily  polyethylene glycol 3350 17 Gram(s) Oral daily  senna 2 Tablet(s) Oral at bedtime  spironolactone 50 milliGRAM(s) Oral daily    MEDICATIONS  (PRN):  acetaminophen     Tablet .. 650 milliGRAM(s) Oral every 6 hours PRN Temp greater or equal to 38C (100.4F), Mild Pain (1 - 3)  aluminum hydroxide/magnesium hydroxide/simethicone Suspension 30 milliLiter(s) Oral every 4 hours PRN Dyspepsia  dextrose Oral Gel 15 Gram(s) Oral once PRN Blood Glucose LESS THAN 70 milliGRAM(s)/deciliter  melatonin 3 milliGRAM(s) Oral at bedtime PRN Insomnia  ondansetron Injectable 4 milliGRAM(s) IV Push every 8 hours PRN Nausea and/or Vomiting      Care Discussed with Consultants/Other Providers [ ] YES  [ ] NO

## 2024-03-26 NOTE — PROGRESS NOTE ADULT - SUBJECTIVE AND OBJECTIVE BOX
CARDIOLOGY FOLLOW UP - Dr. Zavaal  DATE OF SERVICE: 3/26/24    CC no cp or sob       REVIEW OF SYSTEMS:  CONSTITUTIONAL: No fever, weight loss, or fatigue  RESPIRATORY: No cough, wheezing, chills or hemoptysis; No Shortness of Breath  CARDIOVASCULAR: No chest pain, palpitations, passing out, dizziness, or leg swelling  GASTROINTESTINAL: No abdominal or epigastric pain. No nausea, vomiting, or hematemesis; No diarrhea or constipation. No melena or hematochezia.  VASCULAR: No edema     PHYSICAL EXAM:  T(C): 36.3 (03-26-24 @ 05:26), Max: 36.7 (03-25-24 @ 12:58)  HR: 80 (03-26-24 @ 05:26) (78 - 84)  BP: 93/60 (03-26-24 @ 05:26) (91/61 - 110/69)  RR: 18 (03-26-24 @ 05:26) (16 - 18)  SpO2: 99% (03-26-24 @ 05:26) (98% - 99%)  Wt(kg): --  I&O's Summary      Appearance: Normal	  Cardiovascular: Normal S1 S2,RRR, No JVD, No murmurs  Respiratory: Lungs clear to auscultation	  Gastrointestinal:  Soft, Non-tender, + BS	  Extremities: Normal range of motion, No clubbing, cyanosis or edema      Home Medications:  METFORMIN HCL 1,000 MG TABLET: TAKE 1 TABLET BY MOUTH TWICE A DAY WITH MEALS (02 Mar 2024 20:59)  METOPROLOL SUCC ER 50 MG TAB: TAKE 1 TABLET BY MOUTH EVERY DAY (02 Mar 2024 20:59)      MEDICATIONS  (STANDING):  aspirin enteric coated 81 milliGRAM(s) Oral daily  buMETAnide 2 milliGRAM(s) Oral daily  dextrose 5%. 1000 milliLiter(s) (100 mL/Hr) IV Continuous <Continuous>  dextrose 5%. 1000 milliLiter(s) (50 mL/Hr) IV Continuous <Continuous>  dextrose 50% Injectable 25 Gram(s) IV Push once  dextrose 50% Injectable 12.5 Gram(s) IV Push once  dextrose 50% Injectable 25 Gram(s) IV Push once  glucagon  Injectable 1 milliGRAM(s) IntraMuscular once  influenza   Vaccine 0.5 milliLiter(s) IntraMuscular once  insulin lispro (ADMELOG) corrective regimen sliding scale   SubCutaneous three times a day before meals  insulin lispro (ADMELOG) corrective regimen sliding scale   SubCutaneous at bedtime  losartan 25 milliGRAM(s) Oral daily  metoprolol succinate  milliGRAM(s) Oral daily  polyethylene glycol 3350 17 Gram(s) Oral daily  senna 2 Tablet(s) Oral at bedtime  spironolactone 50 milliGRAM(s) Oral daily      TELEMETRY: nsr   9 beats wct 	    ECG:  	  RADIOLOGY:   DIAGNOSTIC TESTING:  [ ] Echocardiogram:  [ ]  Catheterization:  [ ] Stress Test:    OTHER: 	    LABS:	 	        03-25    134<L>  |  97<L>  |  56<H>  ----------------------------<  116<H>  4.1   |  28  |  1.18    Ca    8.7      25 Mar 2024 07:28    TPro  7.3  /  Alb  3.1<L>  /  TBili  0.6  /  DBili  x   /  AST  31  /  ALT  20  /  AlkPhos  284<H>  03-25

## 2024-03-26 NOTE — PROGRESS NOTE ADULT - SUBJECTIVE AND OBJECTIVE BOX
[facility] NEPHROLOGY- PROGRESS NOTE    63y Male with history of CHF presents with SOB. Nephrology consulted for elevated Scr.    REVIEW OF SYSTEMS:  Gen: no fevers  Cards: no chest pain  Resp: no dyspnea  GI: no nausea or vomiting or diarrhea  Vascular: no LE edema    No Known Allergies      Hospital Medications: Medications reviewed      VITALS:  T(F): 97.4 (03-26-24 @ 05:26), Max: 98.1 (03-25-24 @ 12:58)  HR: 80 (03-26-24 @ 05:26)  BP: 93/60 (03-26-24 @ 05:26)  RR: 18 (03-26-24 @ 05:26)  SpO2: 99% (03-26-24 @ 05:26)  Wt(kg): --        PHYSICAL EXAM:    Gen: NAD, calm  Cards: RRR, +S1/S2, no M/G/R  Resp: CTA B/L  GI: soft, NT/ND, NABS  Vascular: no LE edema B/L      LABS:  03-25    134<L>  |  97<L>  |  56<H>  ----------------------------<  116<H>  4.1   |  28  |  1.18    Ca    8.7      25 Mar 2024 07:28    TPro  7.3  /  Alb  3.1<L>  /  TBili  0.6  /  DBili      /  AST  31  /  ALT  20  /  AlkPhos  284<H>  03-25    Creatinine Trend: 1.18 <--, 1.37 <--, 1.58 <--, 1.67 <--    Urine Studies:  Urinalysis Basic - ( 25 Mar 2024 07:28 )    Color:  / Appearance:  / SG:  / pH:   Gluc: 116 mg/dL / Ketone:   / Bili:  / Urobili:    Blood:  / Protein:  / Nitrite:    Leuk Esterase:  / RBC:  / WBC    Sq Epi:  / Non Sq Epi:  / Bacteria:

## 2024-03-26 NOTE — PROGRESS NOTE ADULT - ASSESSMENT
63y Male with history of CHF presents with SOB. Nephrology consulted for elevated Scr.    1. CKD-3a: Scr relatively stable. UA bland with mild proteinuria (spot urine TP/CR 0.3). Bladder scan negative. Outpatient CKD work up. Avoid nephrotoxins. Monitor electrolytes.    2. Hypokalemia: Resolved. Continue with aldactone 50 mg daily. Can hold off on potassium supplementation on discharge as patient has increased his dietary potassium intake. Monitor serum potassium.    3. Metabolic alkalosis: Resolved with de-escalation of diuretics. Monitor pH.    4. HTN with CKD: BP low normal. Continue with current medications. Monitor BP.    5. Acute on chronic systolic HF: Improved. Continue with bumex 2 mg PO daily. TTE with severely decreased LVSF. Monitor .      [facility] NEPHROLOGY  Adi Diaz M.D.  Nj Abarca D.O.  Kezia Dunn M.D.  MD Ute Humphrey, MSN, ANP-C    Telephone: [phone]  Facsimile: [phone]    [facility] [place] [address], [id number]  [place] [address]

## 2024-03-27 ENCOUNTER — TRANSCRIPTION ENCOUNTER (OUTPATIENT)
Age: 64
End: 2024-03-27

## 2024-03-27 LAB
GLUCOSE BLDC GLUCOMTR-MCNC: 105 MG/DL — HIGH (ref 70–99)
GLUCOSE BLDC GLUCOMTR-MCNC: 130 MG/DL — HIGH (ref 70–99)
GLUCOSE BLDC GLUCOMTR-MCNC: 143 MG/DL — HIGH (ref 70–99)
GLUCOSE BLDC GLUCOMTR-MCNC: 154 MG/DL — HIGH (ref 70–99)
GLUCOSE BLDC GLUCOMTR-MCNC: 169 MG/DL — HIGH (ref 70–99)

## 2024-03-27 RX ORDER — METOPROLOL TARTRATE 50 MG
0 TABLET ORAL
Qty: 0 | Refills: 1 | DISCHARGE

## 2024-03-27 RX ORDER — POTASSIUM CHLORIDE 20 MEQ
1 PACKET (EA) ORAL
Qty: 30 | Refills: 0
Start: 2024-03-27 | End: 2024-04-25

## 2024-03-27 RX ORDER — BUMETANIDE 0.25 MG/ML
1 INJECTION INTRAMUSCULAR; INTRAVENOUS
Qty: 30 | Refills: 0
Start: 2024-03-27 | End: 2024-04-25

## 2024-03-27 RX ORDER — METOPROLOL TARTRATE 50 MG
3 TABLET ORAL
Qty: 90 | Refills: 0
Start: 2024-03-27 | End: 2024-04-25

## 2024-03-27 RX ORDER — LOSARTAN POTASSIUM 100 MG/1
1 TABLET, FILM COATED ORAL
Qty: 30 | Refills: 0
Start: 2024-03-27 | End: 2024-04-25

## 2024-03-27 RX ORDER — SPIRONOLACTONE 25 MG/1
2 TABLET, FILM COATED ORAL
Qty: 60 | Refills: 0
Start: 2024-03-27 | End: 2024-04-25

## 2024-03-27 RX ADMIN — BUMETANIDE 2 MILLIGRAM(S): 0.25 INJECTION INTRAMUSCULAR; INTRAVENOUS at 05:35

## 2024-03-27 RX ADMIN — LOSARTAN POTASSIUM 25 MILLIGRAM(S): 100 TABLET, FILM COATED ORAL at 05:34

## 2024-03-27 RX ADMIN — Medication 300 MILLIGRAM(S): at 05:34

## 2024-03-27 RX ADMIN — SPIRONOLACTONE 50 MILLIGRAM(S): 25 TABLET, FILM COATED ORAL at 05:35

## 2024-03-27 RX ADMIN — Medication 1: at 12:43

## 2024-03-27 RX ADMIN — Medication 81 MILLIGRAM(S): at 12:43

## 2024-03-27 NOTE — PROGRESS NOTE ADULT - SUBJECTIVE AND OBJECTIVE BOX
CARDIOLOGY FOLLOW UP - Dr. Zavala  Date of Service: 3/27/24  CC: no events    Review of Systems:  Constitutional: No fever, weight loss, or fatigue  Respiratory: No cough, wheezing, or hemoptysis, no shortness of breath  Cardiovascular: No chest pain, palpitations, passing out, dizziness, or leg swelling  Gastrointestinal: No abd or epigastric pain. No nausea, vomiting, or hematemesis; no diarrhea or consiptaiton, no melena or hematochezia  Vascular: No edema     TELEMETRY:    PHYSICAL EXAM:  T(C): 36.7 (03-27-24 @ 05:30), Max: 36.7 (03-26-24 @ 16:46)  HR: 89 (03-27-24 @ 05:30) (80 - 89)  BP: 120/75 (03-27-24 @ 05:30) (98/58 - 120/75)  RR: 18 (03-27-24 @ 05:30) (16 - 18)  SpO2: 99% (03-27-24 @ 05:30) (98% - 99%)  Wt(kg): --  I&O's Summary      Appearance: Normal	  Cardiovascular: Normal S1 S2,RRR, No JVD, No murmurs  Respiratory: Lungs clear to auscultation	  Gastrointestinal:  Soft, Non-tender, + BS	  Extremities: Normal range of motion, No clubbing, cyanosis or edema  Vascular: Peripheral pulses palpable 2+ bilaterally       Home Medications:  METFORMIN HCL 1,000 MG TABLET: TAKE 1 TABLET BY MOUTH TWICE A DAY WITH MEALS (02 Mar 2024 20:59)  METOPROLOL SUCC ER 50 MG TAB: TAKE 1 TABLET BY MOUTH EVERY DAY (02 Mar 2024 20:59)        MEDICATIONS  (STANDING):  aspirin enteric coated 81 milliGRAM(s) Oral daily  buMETAnide 2 milliGRAM(s) Oral daily  dextrose 5%. 1000 milliLiter(s) (100 mL/Hr) IV Continuous <Continuous>  dextrose 5%. 1000 milliLiter(s) (50 mL/Hr) IV Continuous <Continuous>  dextrose 50% Injectable 25 Gram(s) IV Push once  dextrose 50% Injectable 12.5 Gram(s) IV Push once  dextrose 50% Injectable 25 Gram(s) IV Push once  glucagon  Injectable 1 milliGRAM(s) IntraMuscular once  influenza   Vaccine 0.5 milliLiter(s) IntraMuscular once  insulin lispro (ADMELOG) corrective regimen sliding scale   SubCutaneous at bedtime  insulin lispro (ADMELOG) corrective regimen sliding scale   SubCutaneous three times a day before meals  losartan 25 milliGRAM(s) Oral daily  metoprolol succinate  milliGRAM(s) Oral daily  polyethylene glycol 3350 17 Gram(s) Oral daily  senna 2 Tablet(s) Oral at bedtime  spironolactone 50 milliGRAM(s) Oral daily        EKG:  RADIOLOGY:  DIAGNOSTIC TESTING:  [ ] Echocardiogram:  [ ] Catherterization:  [ ] Stress Test:  OTHER:     LABS:	 	                  CARDIAC MARKERS:

## 2024-03-27 NOTE — PROGRESS NOTE ADULT - ASSESSMENT
ECHO 3/4/23: EF 33% mild MR, Severe global left ventricular systolic dysfunction  cardiac cath from 3/6/23  Coronaries with minor luminal irregularities. Elevated LVEDP.      A/P  63 yr old male with a pmh of HTN, HF rEF ( EF33%), T2DM on metformin who presents with ROSS and bilateral lower extremity edema with his legs feeling heavy.     #Acute on chronic HFrEF  -vol status improved  -cont bumex 2mg PO daily to min hypokalemia  -hold metolazone  -kcl 20meq qd on d/c   -continue aldactone 50mg qd  -Not compliant with home meds (lasix 40mg daily, metoprolol XL 50mg daily, hydralazine 25mg TID)  -repeat echo this admission with ef 27%   Left ventricular systolic function is severely decreased  -Prior cardiac cath from 3/6/23  Coronaries with minor luminal irregularities. Elevated LVEDP.   -strict I/O, monitor daily weights, fluid restriction   -Continue toprol 300mg PO daily-- prior events on tele noted , brief nsvt noted   -s/p ICD  -per ACP insurance does not cover entresto   -c/w losartan     #syncope, loc  -depressed ed, ivcd, nsvt on tele, up to 31 beats  -eps f/u  -s/p icd    #Hypertensive urgency.   -bp now controlled   -continue meds     #T2DM (type 2 diabetes mellitus).   -med f/u     #HLD  -cont statin    dvt ppx  cv stable for d/c planning to [facility]      35 minutes spent on total encounter; more than 50% of the visit was spent counseling and/or coordinating care by the attending physician.

## 2024-03-27 NOTE — DISCHARGE NOTE NURSING/CASE MANAGEMENT/SOCIAL WORK - PATIENT PORTAL LINK FT
You can access the FollowMyHealth Patient Portal offered by Health system by registering at the following website: [email or web address]. By joining [facility]’s FollowMyHealth portal, you will also be able to view your health information using other applications (apps) compatible with our system.

## 2024-03-27 NOTE — DISCHARGE NOTE NURSING/CASE MANAGEMENT/SOCIAL WORK - NSDCPEFALRISK_GEN_ALL_CORE
For information on Fall & Injury Prevention, visit: [email or web address] OR  [email or web address] OR  https://www.cdc.gov/steadi/patient.html

## 2024-03-27 NOTE — DISCHARGE NOTE NURSING/CASE MANAGEMENT/SOCIAL WORK - NSDCFUADDAPPT_GEN_ALL_CORE_FT
-remove outer dressing 24 hours post, after which can shower with only water at the site  -Was told not to rub, apply lotion, soap or cream to site  -Activity restrictions reviewed including not lifting left arm over shoulder and lifting >10 lbs until cleared at his follow up appointment   - Please call office [phone] if he has any questions/concerns or experiences fever, chills, or redness/swelling/increased pain at the incision site   - Go  to the closest ER if experiencing any severe symptoms including chest pain, SOB, lightheadedness, dizziness, syncope  - No MRI x 6 weeks after procedure   - Follow up appointment date and time given to patient 4/4 at 11:20

## 2024-03-27 NOTE — PROGRESS NOTE ADULT - SUBJECTIVE AND OBJECTIVE BOX
Patient is a 63y old  Male who presents with a chief complaint of Acute decompensated heart failure (27 Mar 2024 12:59)      INTERVAL HPI/OVERNIGHT EVENTS: doing well   T(C): 36.3 (03-27-24 @ 10:48), Max: 36.7 (03-27-24 @ 05:30)  HR: 72 (03-27-24 @ 10:48) (72 - 89)  BP: 120/72 (03-27-24 @ 10:48) (98/58 - 120/75)  RR: 18 (03-27-24 @ 10:48) (17 - 18)  SpO2: 100% (03-27-24 @ 10:48) (99% - 100%)  Wt(kg): --  I&O's Summary      PAST MEDICAL & SURGICAL HISTORY:  HTN (hypertension)      HLD (hyperlipidemia)      DM (diabetes mellitus)      HFrEF (heart failure with reduced ejection fraction)      No significant past surgical history          SOCIAL HISTORY  Alcohol:  Tobacco:  Illicit substance use:    FAMILY HISTORY:    REVIEW OF SYSTEMS:  CONSTITUTIONAL: No fever, weight loss, or fatigue  EYES: No eye pain, visual disturbances, or discharge  ENMT:  No difficulty hearing, tinnitus, vertigo; No sinus or throat pain  NECK: No pain or stiffness  RESPIRATORY: No cough, wheezing, chills or hemoptysis; No shortness of breath  CARDIOVASCULAR: No chest pain, palpitations, dizziness, or leg swelling  GASTROINTESTINAL: No abdominal or epigastric pain. No nausea, vomiting, or hematemesis; No diarrhea or constipation. No melena or hematochezia.  GENITOURINARY: No dysuria, frequency, hematuria, or incontinence  NEUROLOGICAL: No headaches, memory loss, loss of strength, numbness, or tremors  SKIN: No itching, burning, rashes, or lesions   LYMPH NODES: No enlarged glands  ENDOCRINE: No heat or cold intolerance; No hair loss  MUSCULOSKELETAL: No joint pain or swelling; No muscle, back, or extremity pain  PSYCHIATRIC: No depression, anxiety, mood swings, or difficulty sleeping  HEME/LYMPH: No easy bruising, or bleeding gums  ALLERY AND IMMUNOLOGIC: No hives or eczema    RADIOLOGY & ADDITIONAL TESTS:    Imaging Personally Reviewed:  [ ] YES  [ ] NO    Consultant(s) Notes Reviewed:  [ ] YES  [ ] NO    PHYSICAL EXAM:  GENERAL: NAD, well-groomed, well-developed  HEAD:  Atraumatic, Normocephalic  EYES: EOMI, PERRLA, conjunctiva and sclera clear  ENMT: No tonsillar erythema, exudates, or enlargement; Moist mucous membranes, Good dentition, No lesions  NECK: Supple, No JVD, Normal thyroid  NERVOUS SYSTEM:  Alert & Oriented X3, Good concentration; Motor Strength 5/5 B/L upper and lower extremities; DTRs 2+ intact and symmetric  CHEST/LUNG: Clear to percussion bilaterally; No rales, rhonchi, wheezing, or rubs  HEART: Regular rate and rhythm; No murmurs, rubs, or gallops  ABDOMEN: Soft, Nontender, Nondistended; Bowel sounds present  EXTREMITIES:  2+ Peripheral Pulses, No clubbing, cyanosis, or edema  LYMPH: No lymphadenopathy noted  SKIN: No rashes or lesions    LABS:              CAPILLARY BLOOD GLUCOSE      POCT Blood Glucose.: 105 mg/dL (27 Mar 2024 17:33)  POCT Blood Glucose.: 169 mg/dL (27 Mar 2024 12:14)  POCT Blood Glucose.: 130 mg/dL (27 Mar 2024 08:41)  POCT Blood Glucose.: 154 mg/dL (27 Mar 2024 05:39)  POCT Blood Glucose.: 159 mg/dL (26 Mar 2024 21:26)            MEDICATIONS  (STANDING):  aspirin enteric coated 81 milliGRAM(s) Oral daily  buMETAnide 2 milliGRAM(s) Oral daily  dextrose 5%. 1000 milliLiter(s) (50 mL/Hr) IV Continuous <Continuous>  dextrose 5%. 1000 milliLiter(s) (100 mL/Hr) IV Continuous <Continuous>  dextrose 50% Injectable 25 Gram(s) IV Push once  dextrose 50% Injectable 12.5 Gram(s) IV Push once  dextrose 50% Injectable 25 Gram(s) IV Push once  glucagon  Injectable 1 milliGRAM(s) IntraMuscular once  influenza   Vaccine 0.5 milliLiter(s) IntraMuscular once  insulin lispro (ADMELOG) corrective regimen sliding scale   SubCutaneous three times a day before meals  insulin lispro (ADMELOG) corrective regimen sliding scale   SubCutaneous at bedtime  losartan 25 milliGRAM(s) Oral daily  metoprolol succinate  milliGRAM(s) Oral daily  polyethylene glycol 3350 17 Gram(s) Oral daily  senna 2 Tablet(s) Oral at bedtime  spironolactone 50 milliGRAM(s) Oral daily    MEDICATIONS  (PRN):  acetaminophen     Tablet .. 650 milliGRAM(s) Oral every 6 hours PRN Temp greater or equal to 38C (100.4F), Mild Pain (1 - 3)  aluminum hydroxide/magnesium hydroxide/simethicone Suspension 30 milliLiter(s) Oral every 4 hours PRN Dyspepsia  dextrose Oral Gel 15 Gram(s) Oral once PRN Blood Glucose LESS THAN 70 milliGRAM(s)/deciliter  melatonin 3 milliGRAM(s) Oral at bedtime PRN Insomnia  ondansetron Injectable 4 milliGRAM(s) IV Push every 8 hours PRN Nausea and/or Vomiting      Care Discussed with Consultants/Other Providers [ ] YES  [ ] NO

## 2024-03-27 NOTE — PROGRESS NOTE ADULT - ASSESSMENT
63y Male with history of CHF presents with SOB. Nephrology consulted for elevated Scr.    1. CKD-3a: Scr relatively stable. UA bland with mild proteinuria (spot urine TP/CR 0.3). Bladder scan negative. Outpatient CKD work up. Avoid nephrotoxins. Monitor electrolytes.    2. Hypokalemia: Resolved. Continue with aldactone 50 mg daily. Can hold off on potassium supplementation on discharge as patient has increased his dietary potassium intake. Monitor serum potassium.    3. Metabolic alkalosis: Resolved with de-escalation of diuretics. Monitor pH.    4. HTN with CKD: BP low normal. Continue with current medications. Monitor BP.    5. Acute on chronic systolic HF: Improved. Continue with bumex 2 mg PO daily. TTE with severely decreased LVSF. Monitor .      [facility] NEPHROLOGY  Adi Diaz M.D.  Nj Abarca D.O.  Kezia Dunn M.D.  MD Ute Humphrey, MSN, ANP-C    Telephone: [phone]  Facsimile: [phone]    [facility] [place] [address], [address]  [place] [address]

## 2024-03-27 NOTE — PROGRESS NOTE ADULT - SUBJECTIVE AND OBJECTIVE BOX
[facility] NEPHROLOGY- PROGRESS NOTE    63y Male with history of CHF presents with SOB. Nephrology consulted for elevated Scr.    REVIEW OF SYSTEMS:  Gen: no fevers  Cards: no chest pain  Resp: no dyspnea  GI: no nausea or vomiting or diarrhea  Vascular: + LE edema    No Known Allergies      Hospital Medications: Medications reviewed      VITALS:  T(F): 97.4 (03-27-24 @ 10:48), Max: 98.1 (03-26-24 @ 16:46)  HR: 72 (03-27-24 @ 10:48)  BP: 120/72 (03-27-24 @ 10:48)  RR: 18 (03-27-24 @ 10:48)  SpO2: 100% (03-27-24 @ 10:48)  Wt(kg): --        PHYSICAL EXAM:    Gen: NAD, calm  Cards: RRR, +S1/S2, no M/G/R  Resp: CTA B/L  GI: soft, NT/ND, NABS  Vascular: trace LE edema B/L      LABS:        Creatinine Trend: 1.18 <--, 1.37 <--, 1.58 <--, 1.67 <--    Urine Studies:  Urinalysis Basic - ( 25 Mar 2024 07:28 )    Color:  / Appearance:  / SG:  / pH:   Gluc: 116 mg/dL / Ketone:   / Bili:  / Urobili:    Blood:  / Protein:  / Nitrite:    Leuk Esterase:  / RBC:  / WBC    Sq Epi:  / Non Sq Epi:  / Bacteria:

## 2024-03-27 NOTE — PROGRESS NOTE ADULT - ASSESSMENT
63 yr old male with a pmh of HTN, HF rEF ( EF33%), T2DM on metformin who presents with ROSS and bilateral lower extremity edema with his legs feeling heavy.     #Acute on chronic systolic heart Failure : due to non complaince with meds : d/w pt and niece need to adhere with meds and need for AICD after re-evaluation of compliance   cont diuresis   now bumex changed to 2 mg daily   off metolazone     # [facility] :   resolved     #syncope  #NSVT on tele   on BBlocker   seen by EP   s/p AICD implant    #Hypertensive   controlled now     #DM-2   a1c 7.1  c/w Insulin / FSBS    #HLD  -cont statin    # non compliance with meds :  counseling done     dispo: stable for d/c

## 2024-03-28 VITALS
RESPIRATION RATE: 18 BRPM | OXYGEN SATURATION: 100 % | HEART RATE: 80 BPM | SYSTOLIC BLOOD PRESSURE: 113 MMHG | TEMPERATURE: 98 F | DIASTOLIC BLOOD PRESSURE: 61 MMHG

## 2024-03-28 LAB
GLUCOSE BLDC GLUCOMTR-MCNC: 120 MG/DL — HIGH (ref 70–99)
GLUCOSE BLDC GLUCOMTR-MCNC: 171 MG/DL — HIGH (ref 70–99)
HCT VFR BLD CALC: 39.9 % — SIGNIFICANT CHANGE UP (ref 39–50)
HGB BLD-MCNC: 13.1 G/DL — SIGNIFICANT CHANGE UP (ref 13–17)
MCHC RBC-ENTMCNC: 28.7 PG — SIGNIFICANT CHANGE UP (ref 27–34)
MCHC RBC-ENTMCNC: 32.8 GM/DL — SIGNIFICANT CHANGE UP (ref 32–36)
MCV RBC AUTO: 87.3 FL — SIGNIFICANT CHANGE UP (ref 80–100)
NRBC # BLD: 0 /100 WBCS — SIGNIFICANT CHANGE UP (ref 0–0)
NRBC # FLD: 0 K/UL — SIGNIFICANT CHANGE UP (ref 0–0)
PLATELET # BLD AUTO: 296 K/UL — SIGNIFICANT CHANGE UP (ref 150–400)
RBC # BLD: 4.57 M/UL — SIGNIFICANT CHANGE UP (ref 4.2–5.8)
RBC # FLD: 13.4 % — SIGNIFICANT CHANGE UP (ref 10.3–14.5)
WBC # BLD: 5.28 K/UL — SIGNIFICANT CHANGE UP (ref 3.8–10.5)
WBC # FLD AUTO: 5.28 K/UL — SIGNIFICANT CHANGE UP (ref 3.8–10.5)

## 2024-03-28 RX ADMIN — BUMETANIDE 2 MILLIGRAM(S): 0.25 INJECTION INTRAMUSCULAR; INTRAVENOUS at 05:10

## 2024-03-28 RX ADMIN — Medication 300 MILLIGRAM(S): at 05:10

## 2024-03-28 RX ADMIN — LOSARTAN POTASSIUM 25 MILLIGRAM(S): 100 TABLET, FILM COATED ORAL at 05:10

## 2024-03-28 RX ADMIN — Medication 1: at 12:50

## 2024-03-28 RX ADMIN — POLYETHYLENE GLYCOL 3350 17 GRAM(S): 17 POWDER, FOR SOLUTION ORAL at 11:22

## 2024-03-28 RX ADMIN — SPIRONOLACTONE 50 MILLIGRAM(S): 25 TABLET, FILM COATED ORAL at 05:09

## 2024-03-28 RX ADMIN — Medication 81 MILLIGRAM(S): at 11:22

## 2024-03-28 NOTE — PROGRESS NOTE ADULT - SUBJECTIVE AND OBJECTIVE BOX
[facility] NEPHROLOGY- PROGRESS NOTE    63y Male with history of CHF presents with SOB. Nephrology consulted for elevated Scr.    REVIEW OF SYSTEMS:  Gen: no fevers  Cards: no chest pain  Resp: no dyspnea  GI: no nausea or vomiting or diarrhea  Vascular: + LE edema    No Known Allergies      Hospital Medications: Medications reviewed      VITALS:  T(F): 98.1 (03-28-24 @ 10:38), Max: 98.1 (03-28-24 @ 10:38)  HR: 80 (03-28-24 @ 10:38)  BP: 113/61 (03-28-24 @ 10:38)  RR: 18 (03-28-24 @ 10:38)  SpO2: 100% (03-28-24 @ 10:38)  Wt(kg): --        PHYSICAL EXAM:    Gen: NAD, calm  Cards: RRR, +S1/S2, no M/G/R  Resp: CTA B/L  GI: soft, NT/ND, NABS  Vascular: trace LE edema B/L      LABS:        Creatinine Trend: 1.18 <--, 1.37 <--                        13.1   5.28  )-----------( 296      ( 28 Mar 2024 11:10 )             39.9     Urine Studies:  Urinalysis Basic - ( 25 Mar 2024 07:28 )    Color:  / Appearance:  / SG:  / pH:   Gluc: 116 mg/dL / Ketone:   / Bili:  / Urobili:    Blood:  / Protein:  / Nitrite:    Leuk Esterase:  / RBC:  / WBC    Sq Epi:  / Non Sq Epi:  / Bacteria:

## 2024-03-28 NOTE — PROGRESS NOTE ADULT - ASSESSMENT
ECHO 3/4/23: EF 33% mild MR, Severe global left ventricular systolic dysfunction  cardiac cath from 3/6/23  Coronaries with minor luminal irregularities. Elevated LVEDP.      A/P  63 yr old male with a pmh of HTN, HF rEF ( EF33%), T2DM on metformin who presents with ROSS and bilateral lower extremity edema with his legs feeling heavy.     #Acute on chronic HFrEF  -vol status improved  -cont bumex 2 mg PO daily to min hypokalemia  -hold metolazone  -continue aldactone 50mg qd  -K stable   -Not compliant with home meds (lasix 40mg daily, metoprolol XL 50mg daily, hydralazine 25mg TID)  -repeat echo this admission with ef 27%   Left ventricular systolic function is severely decreased  -Prior cardiac cath from 3/6/23  Coronaries with minor luminal irregularities. Elevated LVEDP.   -strict I/O, monitor daily weights, fluid restriction   -Continue toprol 300mg PO daily-- prior events on tele noted , brief nsvt noted   -s/p ICD  -swelling ?hematoma noted -- check CBC STAT--CALL EP TO [facility] BEFORE DC    -per ACP insurance does not cover entresto   -c/w losartan     #syncope, loc  -depressed ed, ivcd, nsvt on tele, up to 31 beats  -eps f/u  -s/p icd    #Hypertensive urgency.   -bp now controlled   -continue meds     #T2DM (type 2 diabetes mellitus).   -med f/u     #HLD  -cont statin    #renal fu for vivienne, hyponatremia, hypokalemia     dvt ppx  DCP IF CLEARED TODAY BY EP - D/w DUANE AND RN

## 2024-03-28 NOTE — PROGRESS NOTE ADULT - ASSESSMENT
63 yr old male with a pmh of HTN, HF rEF ( EF33%), T2DM on metformin who presents with ROSS and bilateral lower extremity edema with his legs feeling heavy.     #Acute on chronic systolic heart Failure : due to non complaince with meds : d/w pt and niece need to adhere with meds and need for AICD after re-evaluation of compliance   cont diuresis   now bumex changed to 2 mg daily   off metolazone     # DONNA :   resolved     #syncope  #NSVT on tele   on BBlocker   seen by EP   s/p AICD implant    #Hypertensive   controlled now     #DM-2   a1c 7.1  c/w Insulin / FSBS    #HLD  -cont statin    # non compliance with meds :  counseling done     dispo: stable for d/c home today

## 2024-03-28 NOTE — PROGRESS NOTE ADULT - REASON FOR ADMISSION
Acute decompensated heart failure

## 2024-03-28 NOTE — PROGRESS NOTE ADULT - ASSESSMENT
63y Male with history of CHF presents with SOB. Nephrology consulted for elevated Scr.    1. CKD-3a: Scr relatively stable. UA bland with mild proteinuria (spot urine TP/CR 0.3). Bladder scan negative. Outpatient CKD work up. Avoid nephrotoxins. Monitor electrolytes.    2. Hypokalemia: Resolved. Continue with aldactone 50 mg daily. Can hold off on potassium supplementation on discharge as patient has increased his dietary potassium intake. Monitor serum potassium.    3. Metabolic alkalosis: Resolved with de-escalation of diuretics. Monitor pH.    4. HTN with CKD: BP low normal. Continue with current medications. Monitor BP.    5. Acute on chronic systolic HF: Improved. Continue with bumex 2 mg PO daily. TTE with severely decreased LVSF. Monitor .      [facility] NEPHROLOGY  Adi Diaz M.D.  Nj Abarca D.O.  Kezia Dunn M.D.  MD Ute Humphrey, MSN, ANP-C    Telephone: [phone]  Facsimile: [phone]    [facility] [place] [address], [address]  [place] [address]

## 2024-03-28 NOTE — PROGRESS NOTE ADULT - SUBJECTIVE AND OBJECTIVE BOX
CARDIOLOGY FOLLOW UP - Dr. Zavala  DATE OF SERVICE: 3/28/24    CC no cp or sob       REVIEW OF SYSTEMS:  CONSTITUTIONAL: No fever, weight loss, or fatigue  RESPIRATORY: No cough, wheezing, chills or hemoptysis; No Shortness of Breath  CARDIOVASCULAR: No chest pain, palpitations, passing out, dizziness, or leg swelling  GASTROINTESTINAL: No abdominal or epigastric pain. No nausea, vomiting, or hematemesis; No diarrhea or constipation. No melena or hematochezia.  VASCULAR: No edema     PHYSICAL EXAM:  T(C): 36.6 (03-28-24 @ 05:05), Max: 36.6 (03-27-24 @ 20:20)  HR: 79 (03-28-24 @ 05:05) (72 - 80)  BP: 117/71 (03-28-24 @ 05:05) (108/70 - 120/72)  RR: 18 (03-28-24 @ 05:05) (18 - 18)  SpO2: 97% (03-28-24 @ 05:05) (97% - 100%)  Wt(kg): --  I&O's Summary      Appearance: Normal	  Cardiovascular: Normal S1 S2,RRR  Respiratory: Lungs clear to auscultation	  Gastrointestinal:  Soft, Non-tender, + BS	  Extremities: Normal range of motion, No clubbing, cyanosis or edema  left chest wall site swelling noted- site CDI     Home Medications:  METFORMIN HCL 1,000 MG TABLET: TAKE 1 TABLET BY MOUTH TWICE A DAY WITH MEALS (02 Mar 2024 20:59)      MEDICATIONS  (STANDING):  aspirin enteric coated 81 milliGRAM(s) Oral daily  buMETAnide 2 milliGRAM(s) Oral daily  dextrose 5%. 1000 milliLiter(s) (100 mL/Hr) IV Continuous <Continuous>  dextrose 5%. 1000 milliLiter(s) (50 mL/Hr) IV Continuous <Continuous>  dextrose 50% Injectable 25 Gram(s) IV Push once  dextrose 50% Injectable 25 Gram(s) IV Push once  dextrose 50% Injectable 12.5 Gram(s) IV Push once  glucagon  Injectable 1 milliGRAM(s) IntraMuscular once  influenza   Vaccine 0.5 milliLiter(s) IntraMuscular once  insulin lispro (ADMELOG) corrective regimen sliding scale   SubCutaneous at bedtime  insulin lispro (ADMELOG) corrective regimen sliding scale   SubCutaneous three times a day before meals  losartan 25 milliGRAM(s) Oral daily  metoprolol succinate  milliGRAM(s) Oral daily  polyethylene glycol 3350 17 Gram(s) Oral daily  senna 2 Tablet(s) Oral at bedtime  spironolactone 50 milliGRAM(s) Oral daily      TELEMETRY: nsr pvc  brief nsvt	    ECG:  	  RADIOLOGY:   DIAGNOSTIC TESTING:  [ ] Echocardiogram:  [ ]  Catheterization:  [ ] Stress Test:    OTHER: 	    LABS:

## 2024-03-28 NOTE — PROGRESS NOTE ADULT - TIME BILLING
pt, nurse , acp and cardio
as above
.
as above
Agree with above ACP note.  events noted  nsvt on tele   cont lv dysfxn meds  cont inc bb  cont lasix as ordered  eps eval pending  f/u echo
Patient seen and examined.  Agree with above ACP note.  cv stable for DCP to [facility]  cont current tx
Agree with above ACP note.  cv stable  continued NSVT on tele despite inc bb dose  cont toprol as ordered  eps f/u noted, defer device placement  will recall re? aad vs ICD for continued vt burden  cont diuretics as ordered  add aldactone 25 qd for hypokalemia   dec hydral as needed iof sbp remains borderline  cont tele   trend k
Agree with above ACP note.  cv stable  s/p ICD  cont current mgmt
Agree with above ACP note.  events noted  f/u cbc, eps eval of site  dcp pending eps eval
Agree with above ACP note.  events noted  nsvt on tele   cont lv dysfxn meds  echo ef 27%   Left ventricular systolic function is severely decreased  c/w toprol -- increase to 150 mg daily    eps eval for NSVT,possible  ICD/CRTD for cmp, HF- per EP In light of his noncompliance with optimal medical therapy, he is not a candidate for ICD
Agree with above ACP note.  events noted  nsvt on tele   cont lv dysfxn meds  echo ef 27%   Left ventricular systolic function is severely decreased  c/w toprol -- increase to 200 mg daily    eps eval for NSVT,possible  ICD/CRTD for cmp, HF noted will; reeval when euvolemic
agree with above  change bumex to 2mg IV Q12  strict I/O, monitor daily weights, fluid restriction   Continue toprol 300mg PO daily
Agree with above ACP note.  cv stable  cont high dose BB  eps f/u noted, defer device placement  cont diuretics as ordered  dcp
Patient seen and examined.  Agree with above ACP note.  cv stable for DCP to [facility]  cont current tx
agree with above  cont bumex gtt at 0.5mg/hr  Add 2.5mg metolazone daily  strict I/O, monitor daily weights, fluid restriction   Continue toprol 300mg PO daily

## 2024-03-28 NOTE — CHART NOTE - NSCHARTNOTESELECT_GEN_ALL_CORE
Event Note
Follow-up/Nutrition Services
Event Note
Event Note
site check/Event Note

## 2024-03-28 NOTE — PROGRESS NOTE ADULT - PROVIDER SPECIALTY LIST ADULT
Cardiology
Electrophysiology
Internal Medicine
Nephrology
Cardiology
Electrophysiology
Internal Medicine
Nephrology
Cardiology
Electrophysiology
Internal Medicine
Nephrology
Nephrology
Cardiology
Electrophysiology
Internal Medicine
Nephrology
Nephrology
Cardiology
Internal Medicine

## 2024-03-28 NOTE — PROGRESS NOTE ADULT - SUBJECTIVE AND OBJECTIVE BOX
Patient is a 63y old  Male who presents with a chief complaint of Acute decompensated heart failure (28 Mar 2024 14:38)      INTERVAL HPI/OVERNIGHT EVENTS: seen and examined   T(C): 36.7 (03-28-24 @ 10:38), Max: 36.7 (03-28-24 @ 10:38)  HR: 80 (03-28-24 @ 10:38) (79 - 80)  BP: 113/61 (03-28-24 @ 10:38) (113/61 - 117/71)  RR: 18 (03-28-24 @ 10:38) (18 - 18)  SpO2: 100% (03-28-24 @ 10:38) (97% - 100%)  Wt(kg): --  I&O's Summary      PAST MEDICAL & SURGICAL HISTORY:  HTN (hypertension)      HLD (hyperlipidemia)      DM (diabetes mellitus)      HFrEF (heart failure with reduced ejection fraction)      No significant past surgical history          SOCIAL HISTORY  Alcohol:  Tobacco:  Illicit substance use:    FAMILY HISTORY:    REVIEW OF SYSTEMS:  CONSTITUTIONAL: No fever, weight loss, or fatigue  EYES: No eye pain, visual disturbances, or discharge  ENMT:  No difficulty hearing, tinnitus, vertigo; No sinus or throat pain  NECK: No pain or stiffness  RESPIRATORY: No cough, wheezing, chills or hemoptysis; No shortness of breath  CARDIOVASCULAR: No chest pain, palpitations, dizziness, or leg swelling  GASTROINTESTINAL: No abdominal or epigastric pain. No nausea, vomiting, or hematemesis; No diarrhea or constipation. No melena or hematochezia.  GENITOURINARY: No dysuria, frequency, hematuria, or incontinence  NEUROLOGICAL: No headaches, memory loss, loss of strength, numbness, or tremors  SKIN: No itching, burning, rashes, or lesions   LYMPH NODES: No enlarged glands  ENDOCRINE: No heat or cold intolerance; No hair loss  MUSCULOSKELETAL: No joint pain or swelling; No muscle, back, or extremity pain  PSYCHIATRIC: No depression, anxiety, mood swings, or difficulty sleeping  HEME/LYMPH: No easy bruising, or bleeding gums  ALLERY AND IMMUNOLOGIC: No hives or eczema    RADIOLOGY & ADDITIONAL TESTS:    Imaging Personally Reviewed:  [ ] YES  [ ] NO    Consultant(s) Notes Reviewed:  [ ] YES  [ ] NO    PHYSICAL EXAM:  GENERAL: NAD, well-groomed, well-developed  HEAD:  Atraumatic, Normocephalic  EYES: EOMI, PERRLA, conjunctiva and sclera clear  ENMT: No tonsillar erythema, exudates, or enlargement; Moist mucous membranes, Good dentition, No lesions  NECK: Supple, No JVD, Normal thyroid  NERVOUS SYSTEM:  Alert & Oriented X3, Good concentration; Motor Strength 5/5 B/L upper and lower extremities; DTRs 2+ intact and symmetric  CHEST/LUNG: Clear to percussion bilaterally; No rales, rhonchi, wheezing, or rubs  HEART: Regular rate and rhythm; No murmurs, rubs, or gallops  ABDOMEN: Soft, Nontender, Nondistended; Bowel sounds present  EXTREMITIES:  2+ Peripheral Pulses, No clubbing, cyanosis, or edema  LYMPH: No lymphadenopathy noted  SKIN: No rashes or lesions    LABS:                        13.1   5.28  )-----------( 296      ( 28 Mar 2024 11:10 )             39.9               CAPILLARY BLOOD GLUCOSE      POCT Blood Glucose.: 171 mg/dL (28 Mar 2024 12:17)  POCT Blood Glucose.: 120 mg/dL (28 Mar 2024 08:38)  POCT Blood Glucose.: 143 mg/dL (27 Mar 2024 22:33)            MEDICATIONS  (STANDING):  aspirin enteric coated 81 milliGRAM(s) Oral daily  buMETAnide 2 milliGRAM(s) Oral daily  dextrose 5%. 1000 milliLiter(s) (100 mL/Hr) IV Continuous <Continuous>  dextrose 5%. 1000 milliLiter(s) (50 mL/Hr) IV Continuous <Continuous>  dextrose 50% Injectable 25 Gram(s) IV Push once  dextrose 50% Injectable 25 Gram(s) IV Push once  dextrose 50% Injectable 12.5 Gram(s) IV Push once  glucagon  Injectable 1 milliGRAM(s) IntraMuscular once  influenza   Vaccine 0.5 milliLiter(s) IntraMuscular once  insulin lispro (ADMELOG) corrective regimen sliding scale   SubCutaneous three times a day before meals  insulin lispro (ADMELOG) corrective regimen sliding scale   SubCutaneous at bedtime  losartan 25 milliGRAM(s) Oral daily  metoprolol succinate  milliGRAM(s) Oral daily  polyethylene glycol 3350 17 Gram(s) Oral daily  senna 2 Tablet(s) Oral at bedtime  spironolactone 50 milliGRAM(s) Oral daily    MEDICATIONS  (PRN):  acetaminophen     Tablet .. 650 milliGRAM(s) Oral every 6 hours PRN Temp greater or equal to 38C (100.4F), Mild Pain (1 - 3)  aluminum hydroxide/magnesium hydroxide/simethicone Suspension 30 milliLiter(s) Oral every 4 hours PRN Dyspepsia  dextrose Oral Gel 15 Gram(s) Oral once PRN Blood Glucose LESS THAN 70 milliGRAM(s)/deciliter  melatonin 3 milliGRAM(s) Oral at bedtime PRN Insomnia  ondansetron Injectable 4 milliGRAM(s) IV Push every 8 hours PRN Nausea and/or Vomiting      Care Discussed with Consultants/Other Providers [ ] YES  [ ] NO

## 2024-04-04 ENCOUNTER — APPOINTMENT (OUTPATIENT)
Dept: ELECTROPHYSIOLOGY | Facility: CLINIC | Age: 64
End: 2024-04-04

## 2024-12-30 NOTE — ED PROVIDER NOTE - CPE EDP ENMT NORM
Pharmacy Home Medication Reconciliation Note    A medication reconciliation has been completed for Milan Gonzalez 1942    Pharmacy: [facility] [address] [place] [address]  Information provided by: patient and son    The patient's home medication list is as follows:  No current facility-administered medications on file prior to encounter.     Current Outpatient Medications on File Prior to Encounter   Medication Sig Dispense Refill    sodium zirconium cyclosilicate (LOKELMA) 10 g PACK oral suspension Take 1 packet by mouth daily 30 packet 5    metoprolol succinate (TOPROL XL) 25 MG extended release tablet Take 1 tablet by mouth daily 90 tablet 1    NIFEdipine (ADALAT CC) 60 MG extended release tablet Take 1 tablet by mouth daily      insulin aspart (NOVOLOG FLEXPEN) 100 UNIT/ML injection pen INJECT 5 UNITS UNDER THE SKIN AT BREAKFAST, 10 UNITS AT LUNCH, AND 10 UNITS AT SUPPER (Patient taking differently: Inject 5-10 Units into the skin See Admin Instructions INJECT 5 UNITS UNDER THE SKIN AT BREAKFAST, 10 UNITS AT LUNCH, AND 10 UNITS AT SUPPER) 15 mL 3    atorvastatin (LIPITOR) 40 MG tablet Take 1 tablet by mouth daily 90 tablet 1    Insulin Glargine, 2 Unit Dial, (TOUJEO MAX SOLOSTAR) 300 UNIT/ML concentrated injection pen Inject 18 Units into the skin nightly 5 Adjustable Dose Pre-filled Pen Syringe 2    torsemide (DEMADEX) 10 MG tablet TAKE 2 TABLETS BY MOUTH EVERY MORNING AND TAKE 1 TABLET BY MOUTH DAILY AROUND 3P.M. (Patient taking differently: Take 1 tablet by mouth daily Take one tablet by mouth daily. May take an extra tablet for leg swelling or weight gain of 3+ lbs.) 90 tablet 5    magnesium oxide (MAG-OX) 400 MG tablet Take 1 tablet by mouth daily 90 tablet 0    famotidine (PEPCID) 40 MG tablet Take 1 tablet by mouth daily 90 tablet 1    hydrALAZINE (APRESOLINE) 100 MG tablet Take 1 tablet by mouth 3 times daily 270 tablet 1    sodium bicarbonate 650 MG tablet Take 1 tablet by mouth 3 times  normal...